# Patient Record
Sex: MALE | Race: WHITE | NOT HISPANIC OR LATINO
[De-identification: names, ages, dates, MRNs, and addresses within clinical notes are randomized per-mention and may not be internally consistent; named-entity substitution may affect disease eponyms.]

---

## 2017-01-02 ENCOUNTER — RX RENEWAL (OUTPATIENT)
Age: 82
End: 2017-01-02

## 2017-01-04 ENCOUNTER — APPOINTMENT (OUTPATIENT)
Dept: INTERNAL MEDICINE | Facility: CLINIC | Age: 82
End: 2017-01-04

## 2017-01-04 VITALS
OXYGEN SATURATION: 95 % | TEMPERATURE: 97.4 F | BODY MASS INDEX: 24.38 KG/M2 | HEIGHT: 72 IN | DIASTOLIC BLOOD PRESSURE: 74 MMHG | WEIGHT: 180 LBS | HEART RATE: 85 BPM | SYSTOLIC BLOOD PRESSURE: 115 MMHG

## 2017-01-26 ENCOUNTER — RX RENEWAL (OUTPATIENT)
Age: 82
End: 2017-01-26

## 2017-02-02 ENCOUNTER — APPOINTMENT (OUTPATIENT)
Dept: INTERNAL MEDICINE | Facility: CLINIC | Age: 82
End: 2017-02-02

## 2017-02-02 VITALS
SYSTOLIC BLOOD PRESSURE: 150 MMHG | DIASTOLIC BLOOD PRESSURE: 83 MMHG | TEMPERATURE: 97.6 F | HEART RATE: 83 BPM | BODY MASS INDEX: 23.84 KG/M2 | HEIGHT: 72 IN | WEIGHT: 176 LBS | OXYGEN SATURATION: 96 %

## 2017-02-03 LAB
ALBUMIN SERPL ELPH-MCNC: 4 G/DL
ALP BLD-CCNC: 86 U/L
ALT SERPL-CCNC: 18 U/L
ANION GAP SERPL CALC-SCNC: 17 MMOL/L
AST SERPL-CCNC: 32 U/L
BASOPHILS # BLD AUTO: 0.03 K/UL
BASOPHILS NFR BLD AUTO: 0.4 %
BILIRUB SERPL-MCNC: 0.6 MG/DL
BUN SERPL-MCNC: 16 MG/DL
CALCIUM SERPL-MCNC: 9.7 MG/DL
CHLORIDE SERPL-SCNC: 101 MMOL/L
CHOLEST SERPL-MCNC: 215 MG/DL
CHOLEST/HDLC SERPL: 2.3 RATIO
CO2 SERPL-SCNC: 25 MMOL/L
CREAT SERPL-MCNC: 0.81 MG/DL
EOSINOPHIL # BLD AUTO: 0.24 K/UL
EOSINOPHIL NFR BLD AUTO: 3.5 %
GLUCOSE SERPL-MCNC: 88 MG/DL
HBA1C MFR BLD HPLC: 5.4 %
HCT VFR BLD CALC: 45.2 %
HDLC SERPL-MCNC: 93 MG/DL
HGB BLD-MCNC: 14.9 G/DL
IMM GRANULOCYTES NFR BLD AUTO: 0.3 %
LDLC SERPL CALC-MCNC: 100 MG/DL
LYMPHOCYTES # BLD AUTO: 1.66 K/UL
LYMPHOCYTES NFR BLD AUTO: 23.9 %
MAN DIFF?: NORMAL
MCHC RBC-ENTMCNC: 31.7 PG
MCHC RBC-ENTMCNC: 33 GM/DL
MCV RBC AUTO: 96.2 FL
MONOCYTES # BLD AUTO: 0.57 K/UL
MONOCYTES NFR BLD AUTO: 8.2 %
NEUTROPHILS # BLD AUTO: 4.42 K/UL
NEUTROPHILS NFR BLD AUTO: 63.7 %
PLATELET # BLD AUTO: 305 K/UL
POTASSIUM SERPL-SCNC: 4.6 MMOL/L
PROT SERPL-MCNC: 7 G/DL
RBC # BLD: 4.7 M/UL
RBC # FLD: 12.9 %
SODIUM SERPL-SCNC: 143 MMOL/L
T4 SERPL-MCNC: 8.4 UG/DL
TRIGL SERPL-MCNC: 112 MG/DL
TSH SERPL-ACNC: 2.25 UIU/ML
WBC # FLD AUTO: 6.94 K/UL

## 2017-02-17 ENCOUNTER — APPOINTMENT (OUTPATIENT)
Dept: INTERNAL MEDICINE | Facility: CLINIC | Age: 82
End: 2017-02-17

## 2017-02-17 VITALS
TEMPERATURE: 97.6 F | HEART RATE: 85 BPM | SYSTOLIC BLOOD PRESSURE: 146 MMHG | WEIGHT: 176 LBS | OXYGEN SATURATION: 94 % | DIASTOLIC BLOOD PRESSURE: 89 MMHG | HEIGHT: 72 IN | BODY MASS INDEX: 23.84 KG/M2

## 2017-02-17 RX ORDER — HYDROCORTISONE BUTYRATE 1 MG/G
0.1 CREAM TOPICAL
Qty: 60 | Refills: 0 | Status: COMPLETED | COMMUNITY
Start: 2016-11-07

## 2017-02-17 RX ORDER — LEVOTHYROXINE SODIUM 0.11 MG/1
112 TABLET ORAL
Qty: 90 | Refills: 0 | Status: DISCONTINUED | COMMUNITY
Start: 2016-12-04

## 2017-02-18 LAB
ALBUMIN SERPL ELPH-MCNC: 3.8 G/DL
ALP BLD-CCNC: 160 U/L
ALT SERPL-CCNC: 61 U/L
ANION GAP SERPL CALC-SCNC: 15 MMOL/L
AST SERPL-CCNC: 39 U/L
BASOPHILS # BLD AUTO: 0.03 K/UL
BASOPHILS NFR BLD AUTO: 0.5 %
BILIRUB SERPL-MCNC: 0.7 MG/DL
BUN SERPL-MCNC: 15 MG/DL
CALCIUM SERPL-MCNC: 9.2 MG/DL
CHLORIDE SERPL-SCNC: 99 MMOL/L
CO2 SERPL-SCNC: 26 MMOL/L
CREAT SERPL-MCNC: 0.79 MG/DL
EOSINOPHIL # BLD AUTO: 0.14 K/UL
EOSINOPHIL NFR BLD AUTO: 2.4 %
GLUCOSE SERPL-MCNC: 118 MG/DL
HBA1C MFR BLD HPLC: 5.6 %
HCT VFR BLD CALC: 41.9 %
HGB BLD-MCNC: 14 G/DL
IMM GRANULOCYTES NFR BLD AUTO: 0.2 %
INR PPP: 1.01 RATIO
LPL SERPL-CCNC: 21 U/L
LYMPHOCYTES # BLD AUTO: 1.68 K/UL
LYMPHOCYTES NFR BLD AUTO: 28.6 %
MAN DIFF?: NORMAL
MCHC RBC-ENTMCNC: 31.6 PG
MCHC RBC-ENTMCNC: 33.4 GM/DL
MCV RBC AUTO: 94.6 FL
MONOCYTES # BLD AUTO: 0.51 K/UL
MONOCYTES NFR BLD AUTO: 8.7 %
NEUTROPHILS # BLD AUTO: 3.5 K/UL
NEUTROPHILS NFR BLD AUTO: 59.6 %
PLATELET # BLD AUTO: 429 K/UL
POTASSIUM SERPL-SCNC: 4.2 MMOL/L
PROT SERPL-MCNC: 6.3 G/DL
PT BLD: 11.4 SEC
RBC # BLD: 4.43 M/UL
RBC # FLD: 12.9 %
SODIUM SERPL-SCNC: 140 MMOL/L
T4 SERPL-MCNC: 12.6 UG/DL
TSH SERPL-ACNC: 2.41 UIU/ML
WBC # FLD AUTO: 5.87 K/UL

## 2017-02-19 ENCOUNTER — RX RENEWAL (OUTPATIENT)
Age: 82
End: 2017-02-19

## 2017-02-24 ENCOUNTER — RX RENEWAL (OUTPATIENT)
Age: 82
End: 2017-02-24

## 2017-03-24 ENCOUNTER — EMERGENCY (EMERGENCY)
Facility: HOSPITAL | Age: 82
LOS: 1 days | Discharge: HOME | End: 2017-03-24
Attending: EMERGENCY MEDICINE | Admitting: EMERGENCY MEDICINE
Payer: MEDICARE

## 2017-03-24 VITALS
SYSTOLIC BLOOD PRESSURE: 138 MMHG | HEART RATE: 82 BPM | OXYGEN SATURATION: 100 % | DIASTOLIC BLOOD PRESSURE: 91 MMHG | TEMPERATURE: 98 F | RESPIRATION RATE: 16 BRPM

## 2017-03-24 VITALS
RESPIRATION RATE: 16 BRPM | OXYGEN SATURATION: 98 % | SYSTOLIC BLOOD PRESSURE: 104 MMHG | HEART RATE: 88 BPM | DIASTOLIC BLOOD PRESSURE: 83 MMHG

## 2017-03-24 PROCEDURE — 72125 CT NECK SPINE W/O DYE: CPT | Mod: 26

## 2017-03-24 PROCEDURE — 99284 EMERGENCY DEPT VISIT MOD MDM: CPT | Mod: GC

## 2017-03-24 PROCEDURE — 70450 CT HEAD/BRAIN W/O DYE: CPT | Mod: 26

## 2017-03-24 RX ORDER — TETANUS TOXOID, REDUCED DIPHTHERIA TOXOID AND ACELLULAR PERTUSSIS VACCINE, ADSORBED 5; 2.5; 8; 8; 2.5 [IU]/.5ML; [IU]/.5ML; UG/.5ML; UG/.5ML; UG/.5ML
0.5 SUSPENSION INTRAMUSCULAR ONCE
Qty: 0 | Refills: 0 | Status: COMPLETED | OUTPATIENT
Start: 2017-03-24 | End: 2017-03-24

## 2017-03-24 RX ADMIN — TETANUS TOXOID, REDUCED DIPHTHERIA TOXOID AND ACELLULAR PERTUSSIS VACCINE, ADSORBED 0.5 MILLILITER(S): 5; 2.5; 8; 8; 2.5 SUSPENSION INTRAMUSCULAR at 16:43

## 2017-03-24 NOTE — ED PROVIDER NOTE - OBJECTIVE STATEMENT
82 M presents to ER after mechanical fall which happened when he accidently hit his head on the garage door and fell to the ground.  Patient denies any HA, no current complaints, unknown when tetanus was, currently on ASA / plavix, called PMD and had VNS visit advising ER eval.

## 2017-03-24 NOTE — ED PROVIDER NOTE - PSH
2006 turp    Adrenal Gland Cyst    Bilat Ing Hernia  2009  CVA (Cerebral Infarction)  pt unsure if it was qa stroke or not lost rt peripheral vision which returned  LEFT CATARACT 5/11/10

## 2017-03-24 NOTE — ED PROVIDER NOTE - ATTENDING CONTRIBUTION TO CARE
83 yo male on plavix hit his haed on the garage door No headache, nausea, vomiting, confusion, amnesia, // NL vitals afebrile no distress ao3 rrr s1s2 cta bl abd sntnd no cva or calf tenderness//ct head without bleed dc

## 2017-03-24 NOTE — ED ADULT TRIAGE NOTE - CHIEF COMPLAINT QUOTE
Pt A+OX2-3 and is at his baseline MS.  States he walked into garage door hitting head and fell.  Hematoma to top of head observed.  No open lac.  VN and MD told pt to go to ER for CT.  Denies any other symptoms

## 2017-04-20 ENCOUNTER — APPOINTMENT (OUTPATIENT)
Dept: INTERNAL MEDICINE | Facility: CLINIC | Age: 82
End: 2017-04-20

## 2017-04-20 ENCOUNTER — RX RENEWAL (OUTPATIENT)
Age: 82
End: 2017-04-20

## 2017-04-20 VITALS
HEART RATE: 95 BPM | TEMPERATURE: 97 F | SYSTOLIC BLOOD PRESSURE: 141 MMHG | BODY MASS INDEX: 22.35 KG/M2 | HEIGHT: 72 IN | DIASTOLIC BLOOD PRESSURE: 76 MMHG | OXYGEN SATURATION: 92 % | WEIGHT: 165 LBS

## 2017-04-20 DIAGNOSIS — K80.50 CALCULUS OF BILE DUCT W/OUT CHOLANGITIS OR CHOLECYSTITIS W/OUT OBSTRUCTION: ICD-10-CM

## 2017-04-20 DIAGNOSIS — L03.114 CELLULITIS OF LEFT UPPER LIMB: ICD-10-CM

## 2017-04-20 DIAGNOSIS — K80.20 CALCULUS OF GALLBLADDER W/OUT CHOLECYSTITIS W/OUT OBSTRUCTION: ICD-10-CM

## 2017-04-20 DIAGNOSIS — Z01.818 ENCOUNTER FOR OTHER PREPROCEDURAL EXAMINATION: ICD-10-CM

## 2017-04-20 DIAGNOSIS — R21 RASH AND OTHER NONSPECIFIC SKIN ERUPTION: ICD-10-CM

## 2017-04-20 DIAGNOSIS — Z86.39 PERSONAL HISTORY OF OTHER ENDOCRINE, NUTRITIONAL AND METABOLIC DISEASE: ICD-10-CM

## 2017-04-20 DIAGNOSIS — S80.12XA CONTUSION OF LEFT LOWER LEG, INITIAL ENCOUNTER: ICD-10-CM

## 2017-04-21 LAB
ALBUMIN SERPL ELPH-MCNC: 3.8 G/DL
ALP BLD-CCNC: 134 U/L
ALT SERPL-CCNC: 34 U/L
ANION GAP SERPL CALC-SCNC: 15 MMOL/L
AST SERPL-CCNC: 48 U/L
BASOPHILS # BLD AUTO: 0.02 K/UL
BASOPHILS NFR BLD AUTO: 0.3 %
BILIRUB SERPL-MCNC: 0.3 MG/DL
BUN SERPL-MCNC: 18 MG/DL
CALCIUM SERPL-MCNC: 9.4 MG/DL
CANCER AG19-9 SERPL-ACNC: 33 U/ML
CEA SERPL-MCNC: 2.9 NG/ML
CHLORIDE SERPL-SCNC: 101 MMOL/L
CHOLEST SERPL-MCNC: 170 MG/DL
CHOLEST/HDLC SERPL: 2.7 RATIO
CO2 SERPL-SCNC: 24 MMOL/L
CREAT SERPL-MCNC: 0.8 MG/DL
EOSINOPHIL # BLD AUTO: 0.24 K/UL
EOSINOPHIL NFR BLD AUTO: 3.1 %
GLUCOSE SERPL-MCNC: 91 MG/DL
HBA1C MFR BLD HPLC: 5.5 %
HCT VFR BLD CALC: 38.7 %
HDLC SERPL-MCNC: 62 MG/DL
HGB BLD-MCNC: 12.4 G/DL
IMM GRANULOCYTES NFR BLD AUTO: 0.3 %
LDLC SERPL CALC-MCNC: 81 MG/DL
LYMPHOCYTES # BLD AUTO: 1.63 K/UL
LYMPHOCYTES NFR BLD AUTO: 21.2 %
MAN DIFF?: NORMAL
MCHC RBC-ENTMCNC: 29.7 PG
MCHC RBC-ENTMCNC: 32 GM/DL
MCV RBC AUTO: 92.6 FL
MONOCYTES # BLD AUTO: 0.67 K/UL
MONOCYTES NFR BLD AUTO: 8.7 %
NEUTROPHILS # BLD AUTO: 5.11 K/UL
NEUTROPHILS NFR BLD AUTO: 66.4 %
PLATELET # BLD AUTO: 419 K/UL
POTASSIUM SERPL-SCNC: 4.2 MMOL/L
PROT SERPL-MCNC: 7.2 G/DL
RBC # BLD: 4.18 M/UL
RBC # FLD: 14 %
SODIUM SERPL-SCNC: 140 MMOL/L
T4 SERPL-MCNC: 10.2 UG/DL
TRIGL SERPL-MCNC: 134 MG/DL
TSH SERPL-ACNC: 0.96 UIU/ML
WBC # FLD AUTO: 7.69 K/UL

## 2017-04-30 ENCOUNTER — RX RENEWAL (OUTPATIENT)
Age: 82
End: 2017-04-30

## 2017-05-05 ENCOUNTER — INPATIENT (INPATIENT)
Facility: HOSPITAL | Age: 82
LOS: 3 days | Discharge: HOME | DRG: 872 | End: 2017-05-09
Attending: HOSPITALIST | Admitting: INTERNAL MEDICINE
Payer: MEDICARE

## 2017-05-05 VITALS
DIASTOLIC BLOOD PRESSURE: 88 MMHG | HEART RATE: 121 BPM | SYSTOLIC BLOOD PRESSURE: 153 MMHG | OXYGEN SATURATION: 98 % | TEMPERATURE: 101 F | RESPIRATION RATE: 22 BRPM

## 2017-05-05 PROCEDURE — 99285 EMERGENCY DEPT VISIT HI MDM: CPT | Mod: GC

## 2017-05-05 NOTE — ED PROVIDER NOTE - ATTENDING CONTRIBUTION TO CARE
I have examined and evaluated this patient with the above resident or PA, and agree with the documented clinical history, exam and plan.   Briefly: 82 m h/o htn hld prostate ca presenting with fever, found to be septic due to uti; started on abx, given fluids, admitted to medicine service.

## 2017-05-05 NOTE — ED PROVIDER NOTE - NS ED ROS FT
+ fever, + chills, no change in vision, no change in hearing, no chest pain, no shortness of breath, no abdominal pain, + vomiting, no dysuria, no muscle pain, no rashes, no loss of consciousness. ~ Bry Arthur MD

## 2017-05-05 NOTE — ED PROVIDER NOTE - PHYSICAL EXAMINATION
Physical Exam: elderly M who is in NAD, AAOx3, NCAT, MMM, Trachea midline, PERRLA, CTAB, normal rate and regular rhythm, abdomen is soft and NTND, No edema, No deformity of extremities, No rashes, CN grossly intact, No focal motor or sensory deficits. ~ Bry Arthur MD Physical Exam: elderly M who is in NAD, AAOx3, NCAT, MMM, PERRLA, CTAB, normal rate and regular rhythm, abdomen is soft and NTND, no CVA tenderness, No edema, No deformity of extremities, No rashes, CN grossly intact, No focal motor or sensory deficits. ~ Bry Arthur MD

## 2017-05-05 NOTE — ED PROVIDER NOTE - MEDICAL DECISION MAKING DETAILS
82 m h/o htn hld prostate ca presenting with fever, found to be septic due to uti; started on abx, given fluids, admitted to medicine service.

## 2017-05-05 NOTE — ED ADULT NURSE NOTE - OBJECTIVE STATEMENT
Pt is a 82YOM hx of asthma, HTN, prostate CA BIB EMS complaining of weakness/nausea/vomititing. According to EMS along with pts daughter at bedside, pt saw urologist on Tuesday for routine follow up, was noted to have cloudy urine and was sent for testing. Pts urine resulted with positive UTI, was placed on antibiotics on Thursday. This evening around 7pm, pt was noted to have fever along with associated weakness, nausea and vomiting. Pt did not received anything for fever at home, EMS placed 20G IV to R hand, gave 1L NS and 4mg Zofran IVP. Pt endorses mild improvement, febrile here in .4. Daughter at bedside notes pt to be "more confused then normal", pt A&Ox3 at baseline along with here in ED. Pt is a 82YOM hx of asthma, HTN, prostate CA BIB EMS complaining of weakness/nausea/vomititing. According to EMS along with pts daughter at bedside, pt saw urologist on Tuesday for routine follow up, was noted to have cloudy urine and was sent for testing. Pts urine resulted with positive UTI, was placed on antibiotics on Thursday. This evening around 7pm, pt was noted to have fever along with associated weakness, nausea and vomiting. Pt did not received anything for fever at home, EMS placed 20G IV to R hand, gave 1L NS and 4mg Zofran IVP. Pt endorses mild improvement, febrile here in .4. Daughter at bedside notes pt to be "more confused then normal", pt A&Ox3 at baseline along with here in ED. PT placed on room on monitor, EKG done MD at bedside. Pt is a 82YOM hx of asthma, HTN, prostate CA BIB EMS complaining of weakness/nausea/vomiting. According to EMS along with pts daughter at bedside, pt saw urologist on Tuesday for routine follow up, was noted to have cloudy urine and was sent for testing. Pts urine resulted with positive UTI, was placed on antibiotics on Thursday. This evening around 7pm, pt was noted to have fever along with associated weakness, nausea and vomiting. Pt did not received anything for fever at home, EMS placed 20G IV to R hand, gave 1L NS and 4mg Zofran IVP. Pt endorses mild improvement, febrile here in .4. Daughter at bedside notes pt to be "more confused then normal", pt A&Ox3 at baseline along with here in ED. PT placed on room on monitor, EKG done MD at bedside. Safety maintained at all times, will continue to monitor.

## 2017-05-05 NOTE — ED PROVIDER NOTE - OBJECTIVE STATEMENT
Seen by Urologist 3 days ago, given atbx sulfameth for dark colored urine, but didn't start to take the atbx until this morning.   Started to have fevers 100.7'F, chills, vomiting NBNB x3 that started 8 pm tonight. Associated w/ lethargy, but no falls or head injury. Denies dysuria, or urinary retention.    PMD: Roe Samuel  Uro: Sajan Martinez

## 2017-05-06 DIAGNOSIS — A41.9 SEPSIS, UNSPECIFIED ORGANISM: ICD-10-CM

## 2017-05-06 DIAGNOSIS — N12 TUBULO-INTERSTITIAL NEPHRITIS, NOT SPECIFIED AS ACUTE OR CHRONIC: ICD-10-CM

## 2017-05-06 DIAGNOSIS — Z29.9 ENCOUNTER FOR PROPHYLACTIC MEASURES, UNSPECIFIED: ICD-10-CM

## 2017-05-06 DIAGNOSIS — N40.1 BENIGN PROSTATIC HYPERPLASIA WITH LOWER URINARY TRACT SYMPTOMS: ICD-10-CM

## 2017-05-06 DIAGNOSIS — E03.9 HYPOTHYROIDISM, UNSPECIFIED: ICD-10-CM

## 2017-05-06 DIAGNOSIS — Z79.899 OTHER LONG TERM (CURRENT) DRUG THERAPY: ICD-10-CM

## 2017-05-06 DIAGNOSIS — I10 ESSENTIAL (PRIMARY) HYPERTENSION: ICD-10-CM

## 2017-05-06 LAB
ALBUMIN SERPL ELPH-MCNC: 3.4 G/DL — SIGNIFICANT CHANGE UP (ref 3.3–5)
ALP SERPL-CCNC: 115 U/L — SIGNIFICANT CHANGE UP (ref 40–120)
ALT FLD-CCNC: 23 U/L RC — SIGNIFICANT CHANGE UP (ref 10–45)
ANION GAP SERPL CALC-SCNC: 14 MMOL/L — SIGNIFICANT CHANGE UP (ref 5–17)
ANION GAP SERPL CALC-SCNC: 16 MMOL/L — SIGNIFICANT CHANGE UP (ref 5–17)
APPEARANCE UR: CLEAR — SIGNIFICANT CHANGE UP
APTT BLD: 32 SEC — SIGNIFICANT CHANGE UP (ref 27.5–37.4)
AST SERPL-CCNC: 37 U/L — SIGNIFICANT CHANGE UP (ref 10–40)
BASOPHILS # BLD AUTO: 0 K/UL — SIGNIFICANT CHANGE UP (ref 0–0.2)
BASOPHILS # BLD AUTO: 0 K/UL — SIGNIFICANT CHANGE UP (ref 0–0.2)
BASOPHILS NFR BLD AUTO: 0 % — SIGNIFICANT CHANGE UP (ref 0–2)
BASOPHILS NFR BLD AUTO: 0.1 % — SIGNIFICANT CHANGE UP (ref 0–2)
BILIRUB SERPL-MCNC: 0.5 MG/DL — SIGNIFICANT CHANGE UP (ref 0.2–1.2)
BILIRUB UR-MCNC: NEGATIVE — SIGNIFICANT CHANGE UP
BUN SERPL-MCNC: 14 MG/DL — SIGNIFICANT CHANGE UP (ref 7–23)
BUN SERPL-MCNC: 18 MG/DL — SIGNIFICANT CHANGE UP (ref 7–23)
CALCIUM SERPL-MCNC: 8 MG/DL — LOW (ref 8.4–10.5)
CALCIUM SERPL-MCNC: 8.6 MG/DL — SIGNIFICANT CHANGE UP (ref 8.4–10.5)
CHLORIDE SERPL-SCNC: 101 MMOL/L — SIGNIFICANT CHANGE UP (ref 96–108)
CHLORIDE SERPL-SCNC: 106 MMOL/L — SIGNIFICANT CHANGE UP (ref 96–108)
CO2 SERPL-SCNC: 20 MMOL/L — LOW (ref 22–31)
CO2 SERPL-SCNC: 23 MMOL/L — SIGNIFICANT CHANGE UP (ref 22–31)
COLOR SPEC: YELLOW — SIGNIFICANT CHANGE UP
COMMENT - URINE: SIGNIFICANT CHANGE UP
CREAT SERPL-MCNC: 0.8 MG/DL — SIGNIFICANT CHANGE UP (ref 0.5–1.3)
CREAT SERPL-MCNC: 0.88 MG/DL — SIGNIFICANT CHANGE UP (ref 0.5–1.3)
DIFF PNL FLD: NEGATIVE — SIGNIFICANT CHANGE UP
EOSINOPHIL # BLD AUTO: 0 K/UL — SIGNIFICANT CHANGE UP (ref 0–0.5)
EOSINOPHIL # BLD AUTO: 0.1 K/UL — SIGNIFICANT CHANGE UP (ref 0–0.5)
EOSINOPHIL NFR BLD AUTO: 0.1 % — SIGNIFICANT CHANGE UP (ref 0–6)
EOSINOPHIL NFR BLD AUTO: 0.5 % — SIGNIFICANT CHANGE UP (ref 0–6)
GAS PNL BLDV: SIGNIFICANT CHANGE UP
GLUCOSE SERPL-MCNC: 109 MG/DL — HIGH (ref 70–99)
GLUCOSE SERPL-MCNC: 111 MG/DL — HIGH (ref 70–99)
GLUCOSE UR QL: NEGATIVE — SIGNIFICANT CHANGE UP
HCT VFR BLD CALC: 34.3 % — LOW (ref 39–50)
HCT VFR BLD CALC: 35 % — LOW (ref 39–50)
HGB BLD-MCNC: 12 G/DL — LOW (ref 13–17)
HGB BLD-MCNC: 12.2 G/DL — LOW (ref 13–17)
INR BLD: 1.14 RATIO — SIGNIFICANT CHANGE UP (ref 0.88–1.16)
KETONES UR-MCNC: NEGATIVE — SIGNIFICANT CHANGE UP
LEUKOCYTE ESTERASE UR-ACNC: ABNORMAL
LYMPHOCYTES # BLD AUTO: 0.1 K/UL — LOW (ref 1–3.3)
LYMPHOCYTES # BLD AUTO: 0.2 K/UL — LOW (ref 1–3.3)
LYMPHOCYTES # BLD AUTO: 1.1 % — LOW (ref 13–44)
LYMPHOCYTES # BLD AUTO: 1.6 % — LOW (ref 13–44)
MCHC RBC-ENTMCNC: 31.6 PG — SIGNIFICANT CHANGE UP (ref 27–34)
MCHC RBC-ENTMCNC: 32.1 PG — SIGNIFICANT CHANGE UP (ref 27–34)
MCHC RBC-ENTMCNC: 34.8 GM/DL — SIGNIFICANT CHANGE UP (ref 32–36)
MCHC RBC-ENTMCNC: 35 GM/DL — SIGNIFICANT CHANGE UP (ref 32–36)
MCV RBC AUTO: 90.9 FL — SIGNIFICANT CHANGE UP (ref 80–100)
MCV RBC AUTO: 91.8 FL — SIGNIFICANT CHANGE UP (ref 80–100)
MONOCYTES # BLD AUTO: 0.1 K/UL — SIGNIFICANT CHANGE UP (ref 0–0.9)
MONOCYTES # BLD AUTO: 0.2 K/UL — SIGNIFICANT CHANGE UP (ref 0–0.9)
MONOCYTES NFR BLD AUTO: 1 % — LOW (ref 2–14)
MONOCYTES NFR BLD AUTO: 2.1 % — SIGNIFICANT CHANGE UP (ref 2–14)
NEUTROPHILS # BLD AUTO: 10.4 K/UL — HIGH (ref 1.8–7.4)
NEUTROPHILS # BLD AUTO: 10.7 K/UL — HIGH (ref 1.8–7.4)
NEUTROPHILS NFR BLD AUTO: 96.1 % — HIGH (ref 43–77)
NEUTROPHILS NFR BLD AUTO: 97.3 % — HIGH (ref 43–77)
NITRITE UR-MCNC: NEGATIVE — SIGNIFICANT CHANGE UP
PH UR: 6.5 — SIGNIFICANT CHANGE UP (ref 5–8)
PLATELET # BLD AUTO: 193 K/UL — SIGNIFICANT CHANGE UP (ref 150–400)
PLATELET # BLD AUTO: 242 K/UL — SIGNIFICANT CHANGE UP (ref 150–400)
POTASSIUM SERPL-MCNC: 3.5 MMOL/L — SIGNIFICANT CHANGE UP (ref 3.5–5.3)
POTASSIUM SERPL-MCNC: 3.6 MMOL/L — SIGNIFICANT CHANGE UP (ref 3.5–5.3)
POTASSIUM SERPL-SCNC: 3.5 MMOL/L — SIGNIFICANT CHANGE UP (ref 3.5–5.3)
POTASSIUM SERPL-SCNC: 3.6 MMOL/L — SIGNIFICANT CHANGE UP (ref 3.5–5.3)
PROT SERPL-MCNC: 6.9 G/DL — SIGNIFICANT CHANGE UP (ref 6–8.3)
PROT UR-MCNC: NEGATIVE — SIGNIFICANT CHANGE UP
PROTHROM AB SERPL-ACNC: 12.4 SEC — SIGNIFICANT CHANGE UP (ref 9.8–12.7)
RBC # BLD: 3.73 M/UL — LOW (ref 4.2–5.8)
RBC # BLD: 3.85 M/UL — LOW (ref 4.2–5.8)
RBC # FLD: 12.5 % — SIGNIFICANT CHANGE UP (ref 10.3–14.5)
RBC # FLD: 12.6 % — SIGNIFICANT CHANGE UP (ref 10.3–14.5)
RBC CASTS # UR COMP ASSIST: SIGNIFICANT CHANGE UP /HPF (ref 0–2)
SODIUM SERPL-SCNC: 140 MMOL/L — SIGNIFICANT CHANGE UP (ref 135–145)
SODIUM SERPL-SCNC: 140 MMOL/L — SIGNIFICANT CHANGE UP (ref 135–145)
SP GR SPEC: 1.01 — SIGNIFICANT CHANGE UP (ref 1.01–1.02)
UROBILINOGEN FLD QL: NEGATIVE — SIGNIFICANT CHANGE UP
WBC # BLD: 10.6 K/UL — HIGH (ref 3.8–10.5)
WBC # BLD: 11.2 K/UL — HIGH (ref 3.8–10.5)
WBC # FLD AUTO: 10.6 K/UL — HIGH (ref 3.8–10.5)
WBC # FLD AUTO: 11.2 K/UL — HIGH (ref 3.8–10.5)
WBC UR QL: >50 /HPF (ref 0–5)

## 2017-05-06 PROCEDURE — 93010 ELECTROCARDIOGRAM REPORT: CPT

## 2017-05-06 PROCEDURE — 99223 1ST HOSP IP/OBS HIGH 75: CPT | Mod: GC

## 2017-05-06 PROCEDURE — 71010: CPT | Mod: 26

## 2017-05-06 RX ORDER — CEFTRIAXONE 500 MG/1
1 INJECTION, POWDER, FOR SOLUTION INTRAMUSCULAR; INTRAVENOUS EVERY 24 HOURS
Qty: 0 | Refills: 0 | Status: DISCONTINUED | OUTPATIENT
Start: 2017-05-06 | End: 2017-05-09

## 2017-05-06 RX ORDER — ALBUTEROL 90 UG/1
2 AEROSOL, METERED ORAL EVERY 6 HOURS
Qty: 0 | Refills: 0 | Status: DISCONTINUED | OUTPATIENT
Start: 2017-05-06 | End: 2017-05-09

## 2017-05-06 RX ORDER — ENOXAPARIN SODIUM 100 MG/ML
40 INJECTION SUBCUTANEOUS EVERY 24 HOURS
Qty: 0 | Refills: 0 | Status: DISCONTINUED | OUTPATIENT
Start: 2017-05-06 | End: 2017-05-09

## 2017-05-06 RX ORDER — TAMSULOSIN HYDROCHLORIDE 0.4 MG/1
0.4 CAPSULE ORAL AT BEDTIME
Qty: 0 | Refills: 0 | Status: DISCONTINUED | OUTPATIENT
Start: 2017-05-06 | End: 2017-05-09

## 2017-05-06 RX ORDER — IPRATROPIUM/ALBUTEROL SULFATE 18-103MCG
3 AEROSOL WITH ADAPTER (GRAM) INHALATION ONCE
Qty: 0 | Refills: 0 | Status: DISCONTINUED | OUTPATIENT
Start: 2017-05-06 | End: 2017-05-06

## 2017-05-06 RX ORDER — SODIUM CHLORIDE 9 MG/ML
3 INJECTION INTRAMUSCULAR; INTRAVENOUS; SUBCUTANEOUS ONCE
Qty: 0 | Refills: 0 | Status: COMPLETED | OUTPATIENT
Start: 2017-05-06 | End: 2017-05-06

## 2017-05-06 RX ORDER — SODIUM CHLORIDE 9 MG/ML
1000 INJECTION INTRAMUSCULAR; INTRAVENOUS; SUBCUTANEOUS ONCE
Qty: 0 | Refills: 0 | Status: COMPLETED | OUTPATIENT
Start: 2017-05-06 | End: 2017-05-06

## 2017-05-06 RX ORDER — ACETAMINOPHEN 500 MG
1000 TABLET ORAL ONCE
Qty: 0 | Refills: 0 | Status: COMPLETED | OUTPATIENT
Start: 2017-05-06 | End: 2017-05-06

## 2017-05-06 RX ORDER — BUDESONIDE AND FORMOTEROL FUMARATE DIHYDRATE 160; 4.5 UG/1; UG/1
2 AEROSOL RESPIRATORY (INHALATION)
Qty: 0 | Refills: 0 | Status: DISCONTINUED | OUTPATIENT
Start: 2017-05-06 | End: 2017-05-09

## 2017-05-06 RX ORDER — CITALOPRAM 10 MG/1
10 TABLET, FILM COATED ORAL DAILY
Qty: 0 | Refills: 0 | Status: DISCONTINUED | OUTPATIENT
Start: 2017-05-06 | End: 2017-05-09

## 2017-05-06 RX ORDER — CLOPIDOGREL BISULFATE 75 MG/1
75 TABLET, FILM COATED ORAL DAILY
Qty: 0 | Refills: 0 | Status: DISCONTINUED | OUTPATIENT
Start: 2017-05-06 | End: 2017-05-09

## 2017-05-06 RX ORDER — LEVOTHYROXINE SODIUM 125 MCG
88 TABLET ORAL DAILY
Qty: 0 | Refills: 0 | Status: DISCONTINUED | OUTPATIENT
Start: 2017-05-06 | End: 2017-05-09

## 2017-05-06 RX ORDER — SODIUM CHLORIDE 9 MG/ML
1000 INJECTION, SOLUTION INTRAVENOUS
Qty: 0 | Refills: 0 | Status: DISCONTINUED | OUTPATIENT
Start: 2017-05-06 | End: 2017-05-06

## 2017-05-06 RX ORDER — SODIUM CHLORIDE 9 MG/ML
500 INJECTION INTRAMUSCULAR; INTRAVENOUS; SUBCUTANEOUS
Qty: 0 | Refills: 0 | Status: COMPLETED | OUTPATIENT
Start: 2017-05-06 | End: 2017-05-06

## 2017-05-06 RX ORDER — ONDANSETRON 8 MG/1
4 TABLET, FILM COATED ORAL EVERY 6 HOURS
Qty: 0 | Refills: 0 | Status: DISCONTINUED | OUTPATIENT
Start: 2017-05-06 | End: 2017-05-09

## 2017-05-06 RX ORDER — ACETAMINOPHEN 500 MG
650 TABLET ORAL EVERY 6 HOURS
Qty: 0 | Refills: 0 | Status: DISCONTINUED | OUTPATIENT
Start: 2017-05-06 | End: 2017-05-09

## 2017-05-06 RX ORDER — CEFTRIAXONE 500 MG/1
1 INJECTION, POWDER, FOR SOLUTION INTRAMUSCULAR; INTRAVENOUS ONCE
Qty: 0 | Refills: 0 | Status: COMPLETED | OUTPATIENT
Start: 2017-05-06 | End: 2017-05-06

## 2017-05-06 RX ORDER — ASPIRIN/CALCIUM CARB/MAGNESIUM 324 MG
81 TABLET ORAL DAILY
Qty: 0 | Refills: 0 | Status: DISCONTINUED | OUTPATIENT
Start: 2017-05-06 | End: 2017-05-09

## 2017-05-06 RX ADMIN — SODIUM CHLORIDE 100 MILLILITER(S): 9 INJECTION, SOLUTION INTRAVENOUS at 10:01

## 2017-05-06 RX ADMIN — Medication 88 MICROGRAM(S): at 09:57

## 2017-05-06 RX ADMIN — CITALOPRAM 10 MILLIGRAM(S): 10 TABLET, FILM COATED ORAL at 12:09

## 2017-05-06 RX ADMIN — SODIUM CHLORIDE 2000 MILLILITER(S): 9 INJECTION INTRAMUSCULAR; INTRAVENOUS; SUBCUTANEOUS at 00:15

## 2017-05-06 RX ADMIN — Medication 81 MILLIGRAM(S): at 12:09

## 2017-05-06 RX ADMIN — SODIUM CHLORIDE 100 MILLILITER(S): 9 INJECTION, SOLUTION INTRAVENOUS at 06:43

## 2017-05-06 RX ADMIN — CLOPIDOGREL BISULFATE 75 MILLIGRAM(S): 75 TABLET, FILM COATED ORAL at 12:17

## 2017-05-06 RX ADMIN — Medication 650 MILLIGRAM(S): at 20:29

## 2017-05-06 RX ADMIN — CEFTRIAXONE 100 GRAM(S): 500 INJECTION, POWDER, FOR SOLUTION INTRAMUSCULAR; INTRAVENOUS at 00:30

## 2017-05-06 RX ADMIN — Medication 650 MILLIGRAM(S): at 21:29

## 2017-05-06 RX ADMIN — CEFTRIAXONE 100 GRAM(S): 500 INJECTION, POWDER, FOR SOLUTION INTRAMUSCULAR; INTRAVENOUS at 06:43

## 2017-05-06 RX ADMIN — SODIUM CHLORIDE 1000 MILLILITER(S): 9 INJECTION INTRAMUSCULAR; INTRAVENOUS; SUBCUTANEOUS at 03:03

## 2017-05-06 RX ADMIN — BUDESONIDE AND FORMOTEROL FUMARATE DIHYDRATE 2 PUFF(S): 160; 4.5 AEROSOL RESPIRATORY (INHALATION) at 23:01

## 2017-05-06 RX ADMIN — SODIUM CHLORIDE 2000 MILLILITER(S): 9 INJECTION INTRAMUSCULAR; INTRAVENOUS; SUBCUTANEOUS at 00:16

## 2017-05-06 RX ADMIN — SODIUM CHLORIDE 2000 MILLILITER(S): 9 INJECTION INTRAMUSCULAR; INTRAVENOUS; SUBCUTANEOUS at 01:40

## 2017-05-06 RX ADMIN — ALBUTEROL 2 PUFF(S): 90 AEROSOL, METERED ORAL at 06:43

## 2017-05-06 RX ADMIN — ONDANSETRON 4 MILLIGRAM(S): 8 TABLET, FILM COATED ORAL at 05:53

## 2017-05-06 RX ADMIN — TAMSULOSIN HYDROCHLORIDE 0.4 MILLIGRAM(S): 0.4 CAPSULE ORAL at 23:00

## 2017-05-06 RX ADMIN — SODIUM CHLORIDE 2000 MILLILITER(S): 9 INJECTION INTRAMUSCULAR; INTRAVENOUS; SUBCUTANEOUS at 01:39

## 2017-05-06 RX ADMIN — Medication 400 MILLIGRAM(S): at 00:15

## 2017-05-06 RX ADMIN — SODIUM CHLORIDE 3 MILLILITER(S): 9 INJECTION INTRAMUSCULAR; INTRAVENOUS; SUBCUTANEOUS at 00:08

## 2017-05-06 RX ADMIN — BUDESONIDE AND FORMOTEROL FUMARATE DIHYDRATE 2 PUFF(S): 160; 4.5 AEROSOL RESPIRATORY (INHALATION) at 09:57

## 2017-05-06 RX ADMIN — ONDANSETRON 4 MILLIGRAM(S): 8 TABLET, FILM COATED ORAL at 14:18

## 2017-05-06 NOTE — ED ADULT NURSE REASSESSMENT NOTE - NS ED NURSE REASSESS COMMENT FT1
0250:Straight catheterization performed with 2 RNs at the bedside using sterile technique as per MD order.  Patient tolerated procedure well.  U/A and urine culture obtained and sent to the lab per MD order.  IV fluids still in progress, pending bed assignment upstairs.  Patient is awake and alert and mentating well.  A&O x3, and in no acute distress.

## 2017-05-06 NOTE — H&P ADULT. - PMH
Asthma    BPH (benign prostatic hypertrophy) with urinary retention    Hypertension    Hypothyroid    Prostate Cancer  s/p RT

## 2017-05-06 NOTE — H&P ADULT. - PROBLEM SELECTOR PLAN 2
- meets sepsis criteria with tachycardia and fever with hypotension to SBP 86 in ED  - f/u BCx x2 and UCx sent in ED  - s/p CTX 1g in ED; will c/w CTX 1g QD  - with hypotension in ED that responded to fluid resuscitation; will start LR@75 as still dry on exam, hold home antihypertensives, and monitor VS closely with q4

## 2017-05-06 NOTE — H&P ADULT. - PROBLEM SELECTOR PLAN 1
- noted to have dark urine despite adequate PO hydration 4d PTA by Urologist Dr Martinez but did not start abx until AM prior to admission  - UA(+) with Tmax 101.4 and hypotension down to SBP 86 in ED, meeting sepsis criteria with tachycardia and fever  - f/u BCx x2 and UCx sent in ED  - s/p CTX 1g in ED; will c/w CTX 1g QD  - with hypotension in ED that responded to fluid resuscitation; will start LR@75 as still dry on exam, hold home antihypertensives, and monitor VS closely with q4  - Tylenol PRN for fever  - Zofran PRN for N/V - given N/V, F/C and severity of illness, most likely pyelonephritis  - noted to have dark urine despite adequate PO hydration 4d PTA by Urologist Dr Martinez but did not start abx until AM prior to admission  - UA(+) with Tmax 101.4 and hypotension down to SBP 86 in ED, meeting sepsis criteria with tachycardia and fever  - f/u BCx x2 and UCx sent in ED  - s/p CTX 1g in ED; will c/w CTX 1g QD  - with hypotension in ED that responded to fluid resuscitation; will start LR@100 x12h as still dry on exam, hold home antihypertensives, and monitor VS closely with q4  - Tylenol PRN for fever  - Zofran PRN for N/V  - lyon catheter for monitoring critically ill and to relieve any obstruction

## 2017-05-06 NOTE — H&P ADULT. - NEGATIVE NEUROLOGICAL SYMPTOMS
no paresthesias/no loss of sensation/no headache/no vertigo/no difficulty walking/no syncope/no focal seizures/no transient paralysis/no tremors

## 2017-05-06 NOTE — H&P ADULT. - EKG AND INTERPRETATION
personally reviewed and interpreted: personally reviewed and interpreted: sinus tachycardia 120, QTc 444, L axis deviation

## 2017-05-06 NOTE — H&P ADULT. - LAB RESULTS AND INTERPRETATION
personally reviewed and interpreted: mild leukocytosis with PMN predominance, baseline Cr, lactate WNL, UA(+)

## 2017-05-06 NOTE — H&P ADULT. - RS GEN PE MLT RESP DETAILS PC
no rhonchi/no rales/clear to auscultation bilaterally/no intercostal retractions/respirations non-labored/no wheezes no rhonchi/rales/clear to auscultation bilaterally/wheezes/no intercostal retractions/respirations non-labored

## 2017-05-06 NOTE — H&P ADULT. - NEUROLOGICAL DETAILS
normal strength/responds to pain/cranial nerves intact/responds to verbal commands/alert and oriented x 3/sensation intact

## 2017-05-06 NOTE — H&P ADULT. - PROBLEM SELECTOR PLAN 4
- patient unable to recall BPH medications and none noted in outpatient EMR and [facility]; primary team to f/u with wife in AM  - with significant h/o urinary retention requiring lyon recently - c/w home tamsulosin  - with significant h/o urinary retention requiring lyon recently

## 2017-05-06 NOTE — H&P ADULT. - NEGATIVE ENMT SYMPTOMS
no ear pain/no post-nasal discharge/no vertigo/no throat pain/no tinnitus/no sinus symptoms/no nasal congestion/no nasal discharge

## 2017-05-06 NOTE — PROVIDER CONTACT NOTE (OTHER) - SITUATION
VSS, pt refusing synthroid, stating he cannot tolerate it right now. And refusing physical assessment.

## 2017-05-06 NOTE — H&P ADULT. - PROBLEM SELECTOR PLAN 6
- Lovenox for DVT ppx - Lovenox for DVT ppx  - speak with PMD regarding whether patient has h/o stents or cardiac hx; consider monotherapy with Plavix for secondary prevention for TIA ppx

## 2017-05-06 NOTE — H&P ADULT. - HISTORY OF PRESENT ILLNESS
82M h/o asthma/COPD, HTN, h/o TIA, chronic LBP, hypothyroidism, BPH c/b urinary retention (previously with lyon) who was BIBEMS for lethargy, delirium.  Patient was seen by  4d PTA and as he had dark urine, he was started on Sulfameth.  Unfortunately, patient only started to take abx the morning PTA.  At 8p evening PTA, patient stated to have F/C with Tmax 100.7, NBNB N/V x3, and was lethargic.    Of note, patient was seen recently by PMD Dr Samuel on 4/20 and was noted to have 2mo of poor appetite, anhedonia, and listlessness, was started on Celexa 10mg QD with plans to uptitrate to 20 after 4 weeks, and TSH, CEA, and  were check and WNL.    ED Course  VS Tmax 101.2 HR  BP /61-88 RR 22 SpO2 96%NC2L  WBC 11.2 ANC 10,200 (96.1%) Cr 0.88 (baseline 0.8) lactate 1.8  UA large LE, WBC >50 with clumps  CXR R basilar atelectasis  Given CTX 1g, NS 3L, Ofirmev. 82M h/o asthma/COPD, HTN, h/o TIA, chronic LBP, hypothyroidism, BPH c/b urinary retention (previously with lyon) who was BIBEMS for fevers with rigors.  Patient was seen by  Dr Martinez 4d PTA and as he had dark urine and was started on Sulfameth.  Patient only started to take abx the morning PTA.  At 8p evening PTA, patient stated to have F/C with Tmax 100.7, NBNB N/V x1 (clear fluid), and was lethargic.  As he was shaking so much, he called EMS and was brought to the hospital.   Endorses weakness, large amount of urine when he urinates, suprapubic TTP; denies dysuria, urinary hesitancy/frequency, hematuria.  No prior h/o UTIs.  No dizziness, CP, SOB, cough, nasal sx, abdominal pain, C/D, myalgias/athralgias.    Of note, patient was seen recently by PMD Dr Samuel on 4/20 and was noted to have 2mo of poor appetite, anhedonia, and listlessness, was started on Celexa 10mg QD with plans to uptitrate to 20 after 4 weeks, and TSH, CEA, and  were check and WNL.    ED Course  VS Tmax 101.2 HR  BP /61-88 RR 22 SpO2 96%NC2L  WBC 11.2 ANC 10,200 (96.1%) Cr 0.88 (baseline 0.8) lactate 1.8  UA large LE, WBC >50 with clumps  CXR R basilar atelectasis  Given CTX 1g, NS 3L, Ofirmev.  BCx x2 and UCx sent. 82M h/o COPD, HTN, h/o TIA, chronic LBP, hypothyroidism, BPH c/b urinary retention (previously with lyon) who was BIBEMS for fevers with rigors.  Patient was seen by  Dr Martinez 4d PTA and as he had dark urine and was started on Bactrim.  Patient only started to take abx the morning PTA.  At 8p evening PTA, patient stated to have F/C with Tmax 100.7, NBNB N/V x1 (clear fluid), and was lethargic.  As he was shaking so much, he called EMS and was brought to the hospital.   Endorses weakness, large amount of urine when he urinates, suprapubic TTP; denies flank pain, dysuria, urinary hesitancy/frequency, hematuria.  No prior h/o UTIs.  No dizziness, CP, SOB, cough, nasal sx, abdominal pain, C/D, myalgias/athralgias.    Of note, patient was seen recently by PMD Dr Samuel on 4/20 and was noted to have 2mo of poor appetite, anhedonia, and listlessness, was started on Celexa 10mg QD with plans to uptitrate to 20 after 4 weeks, and TSH, CEA, and  were check and WNL.    ED Course  VS Tmax 101.2 HR  BP /61-88 RR 22 SpO2 96%NC2L  WBC 11.2 ANC 10,200 (96.1%) Cr 0.88 (baseline 0.8) lactate 1.8  UA large LE, WBC >50 with clumps  CXR R basilar atelectasis  Given CTX 1g, NS 3L, Ofirmev.  BCx x2 and UCx sent.

## 2017-05-06 NOTE — H&P ADULT. - ASSESSMENT
82M h/o asthma/COPD, HTN, h/o TIA, chronic LBP, hypothyroidism, BPH c/b urinary retention (previously with lyon) who was BIBEMS for fevers with rigors likely 2/2 pyelonephritis.

## 2017-05-06 NOTE — H&P ADULT. - NEGATIVE GENERAL GENITOURINARY SYMPTOMS
no hematuria/no flank pain R/no dysuria/no flank pain L/normal urinary frequency/no bladder infections/no urinary hesitancy

## 2017-05-06 NOTE — H&P ADULT. - PROBLEM SELECTOR PLAN 3
- hold home antihypertensives as hypotension 2/2 sepsis  - DASH diet - hold home antihypertensives and Lasix as hypotension 2/2 sepsis  - DASH diet

## 2017-05-07 LAB
ANION GAP SERPL CALC-SCNC: 14 MMOL/L — SIGNIFICANT CHANGE UP (ref 5–17)
BASOPHILS # BLD AUTO: 0 K/UL — SIGNIFICANT CHANGE UP (ref 0–0.2)
BASOPHILS NFR BLD AUTO: 0 % — SIGNIFICANT CHANGE UP (ref 0–2)
BUN SERPL-MCNC: 16 MG/DL — SIGNIFICANT CHANGE UP (ref 7–23)
CALCIUM SERPL-MCNC: 8.3 MG/DL — LOW (ref 8.4–10.5)
CHLORIDE SERPL-SCNC: 104 MMOL/L — SIGNIFICANT CHANGE UP (ref 96–108)
CO2 SERPL-SCNC: 18 MMOL/L — LOW (ref 22–31)
CREAT SERPL-MCNC: 0.73 MG/DL — SIGNIFICANT CHANGE UP (ref 0.5–1.3)
CULTURE RESULTS: SIGNIFICANT CHANGE UP
EOSINOPHIL # BLD AUTO: 0 K/UL — SIGNIFICANT CHANGE UP (ref 0–0.5)
EOSINOPHIL NFR BLD AUTO: 0 % — SIGNIFICANT CHANGE UP (ref 0–6)
GLUCOSE SERPL-MCNC: 113 MG/DL — HIGH (ref 70–99)
HCT VFR BLD CALC: 33.2 % — LOW (ref 39–50)
HGB BLD-MCNC: 11.1 G/DL — LOW (ref 13–17)
IMM GRANULOCYTES NFR BLD AUTO: 0.3 % — SIGNIFICANT CHANGE UP (ref 0–1.5)
LYMPHOCYTES # BLD AUTO: 0.14 K/UL — LOW (ref 1–3.3)
LYMPHOCYTES # BLD AUTO: 1.9 % — LOW (ref 13–44)
MAGNESIUM SERPL-MCNC: 1.6 MG/DL — SIGNIFICANT CHANGE UP (ref 1.6–2.6)
MCHC RBC-ENTMCNC: 30.2 PG — SIGNIFICANT CHANGE UP (ref 27–34)
MCHC RBC-ENTMCNC: 33.4 GM/DL — SIGNIFICANT CHANGE UP (ref 32–36)
MCV RBC AUTO: 90.2 FL — SIGNIFICANT CHANGE UP (ref 80–100)
MONOCYTES # BLD AUTO: 0.14 K/UL — SIGNIFICANT CHANGE UP (ref 0–0.9)
MONOCYTES NFR BLD AUTO: 1.9 % — LOW (ref 2–14)
NEUTROPHILS # BLD AUTO: 7.26 K/UL — SIGNIFICANT CHANGE UP (ref 1.8–7.4)
NEUTROPHILS NFR BLD AUTO: 95.9 % — HIGH (ref 43–77)
PHOSPHATE SERPL-MCNC: 1.2 MG/DL — LOW (ref 2.5–4.5)
PLATELET # BLD AUTO: 185 K/UL — SIGNIFICANT CHANGE UP (ref 150–400)
POTASSIUM SERPL-MCNC: 3.7 MMOL/L — SIGNIFICANT CHANGE UP (ref 3.5–5.3)
POTASSIUM SERPL-SCNC: 3.7 MMOL/L — SIGNIFICANT CHANGE UP (ref 3.5–5.3)
RBC # BLD: 3.68 M/UL — LOW (ref 4.2–5.8)
RBC # FLD: 14.3 % — SIGNIFICANT CHANGE UP (ref 10.3–14.5)
SODIUM SERPL-SCNC: 136 MMOL/L — SIGNIFICANT CHANGE UP (ref 135–145)
SPECIMEN SOURCE: SIGNIFICANT CHANGE UP
WBC # BLD: 7.56 K/UL — SIGNIFICANT CHANGE UP (ref 3.8–10.5)
WBC # FLD AUTO: 7.56 K/UL — SIGNIFICANT CHANGE UP (ref 3.8–10.5)

## 2017-05-07 PROCEDURE — 99232 SBSQ HOSP IP/OBS MODERATE 35: CPT

## 2017-05-07 RX ADMIN — ONDANSETRON 4 MILLIGRAM(S): 8 TABLET, FILM COATED ORAL at 03:30

## 2017-05-07 RX ADMIN — BUDESONIDE AND FORMOTEROL FUMARATE DIHYDRATE 2 PUFF(S): 160; 4.5 AEROSOL RESPIRATORY (INHALATION) at 09:27

## 2017-05-07 RX ADMIN — Medication 88 MICROGRAM(S): at 05:53

## 2017-05-07 RX ADMIN — CEFTRIAXONE 100 GRAM(S): 500 INJECTION, POWDER, FOR SOLUTION INTRAMUSCULAR; INTRAVENOUS at 05:54

## 2017-05-07 RX ADMIN — ALBUTEROL 2 PUFF(S): 90 AEROSOL, METERED ORAL at 02:39

## 2017-05-07 RX ADMIN — CLOPIDOGREL BISULFATE 75 MILLIGRAM(S): 75 TABLET, FILM COATED ORAL at 11:25

## 2017-05-07 RX ADMIN — TAMSULOSIN HYDROCHLORIDE 0.4 MILLIGRAM(S): 0.4 CAPSULE ORAL at 21:30

## 2017-05-07 RX ADMIN — Medication 650 MILLIGRAM(S): at 15:23

## 2017-05-07 RX ADMIN — BUDESONIDE AND FORMOTEROL FUMARATE DIHYDRATE 2 PUFF(S): 160; 4.5 AEROSOL RESPIRATORY (INHALATION) at 21:30

## 2017-05-07 RX ADMIN — ENOXAPARIN SODIUM 40 MILLIGRAM(S): 100 INJECTION SUBCUTANEOUS at 05:53

## 2017-05-07 RX ADMIN — Medication 650 MILLIGRAM(S): at 15:47

## 2017-05-07 RX ADMIN — Medication 81 MILLIGRAM(S): at 11:25

## 2017-05-07 RX ADMIN — Medication 63.75 MILLIMOLE(S): at 12:34

## 2017-05-07 RX ADMIN — CITALOPRAM 10 MILLIGRAM(S): 10 TABLET, FILM COATED ORAL at 11:25

## 2017-05-08 ENCOUNTER — RX RENEWAL (OUTPATIENT)
Age: 82
End: 2017-05-08

## 2017-05-08 ENCOUNTER — TRANSCRIPTION ENCOUNTER (OUTPATIENT)
Age: 82
End: 2017-05-08

## 2017-05-08 LAB
ANION GAP SERPL CALC-SCNC: 12 MMOL/L — SIGNIFICANT CHANGE UP (ref 5–17)
BASOPHILS # BLD AUTO: 0 K/UL — SIGNIFICANT CHANGE UP (ref 0–0.2)
BASOPHILS # BLD AUTO: 0 K/UL — SIGNIFICANT CHANGE UP (ref 0–0.2)
BASOPHILS NFR BLD AUTO: 0 % — SIGNIFICANT CHANGE UP (ref 0–2)
BASOPHILS NFR BLD AUTO: 0 % — SIGNIFICANT CHANGE UP (ref 0–2)
BUN SERPL-MCNC: 17 MG/DL — SIGNIFICANT CHANGE UP (ref 7–23)
CALCIUM SERPL-MCNC: 8.2 MG/DL — LOW (ref 8.4–10.5)
CHLORIDE SERPL-SCNC: 101 MMOL/L — SIGNIFICANT CHANGE UP (ref 96–108)
CO2 SERPL-SCNC: 19 MMOL/L — LOW (ref 22–31)
CREAT SERPL-MCNC: 0.66 MG/DL — SIGNIFICANT CHANGE UP (ref 0.5–1.3)
EOSINOPHIL # BLD AUTO: 0 K/UL — SIGNIFICANT CHANGE UP (ref 0–0.5)
EOSINOPHIL # BLD AUTO: 0 K/UL — SIGNIFICANT CHANGE UP (ref 0–0.5)
EOSINOPHIL NFR BLD AUTO: 0 % — SIGNIFICANT CHANGE UP (ref 0–6)
EOSINOPHIL NFR BLD AUTO: 0.8 % — SIGNIFICANT CHANGE UP (ref 0–6)
GLUCOSE SERPL-MCNC: 83 MG/DL — SIGNIFICANT CHANGE UP (ref 70–99)
HCT VFR BLD CALC: 31.3 % — LOW (ref 39–50)
HCT VFR BLD CALC: 34.2 % — LOW (ref 39–50)
HGB BLD-MCNC: 10.3 G/DL — LOW (ref 13–17)
HGB BLD-MCNC: 11.8 G/DL — LOW (ref 13–17)
LYMPHOCYTES # BLD AUTO: 0.39 K/UL — LOW (ref 1–3.3)
LYMPHOCYTES # BLD AUTO: 0.5 K/UL — LOW (ref 1–3.3)
LYMPHOCYTES # BLD AUTO: 11 % — LOW (ref 13–44)
LYMPHOCYTES # BLD AUTO: 14 % — SIGNIFICANT CHANGE UP (ref 13–44)
MAGNESIUM SERPL-MCNC: 1.7 MG/DL — SIGNIFICANT CHANGE UP (ref 1.6–2.6)
MCHC RBC-ENTMCNC: 29.1 PG — SIGNIFICANT CHANGE UP (ref 27–34)
MCHC RBC-ENTMCNC: 31.6 PG — SIGNIFICANT CHANGE UP (ref 27–34)
MCHC RBC-ENTMCNC: 32.9 GM/DL — SIGNIFICANT CHANGE UP (ref 32–36)
MCHC RBC-ENTMCNC: 34.5 GM/DL — SIGNIFICANT CHANGE UP (ref 32–36)
MCV RBC AUTO: 88.4 FL — SIGNIFICANT CHANGE UP (ref 80–100)
MCV RBC AUTO: 91.6 FL — SIGNIFICANT CHANGE UP (ref 80–100)
MONOCYTES # BLD AUTO: 0.04 K/UL — SIGNIFICANT CHANGE UP (ref 0–0.9)
MONOCYTES # BLD AUTO: 0.2 K/UL — SIGNIFICANT CHANGE UP (ref 0–0.9)
MONOCYTES NFR BLD AUTO: 1 % — LOW (ref 2–14)
MONOCYTES NFR BLD AUTO: 5.3 % — SIGNIFICANT CHANGE UP (ref 2–14)
NEUTROPHILS # BLD AUTO: 2.8 K/UL — SIGNIFICANT CHANGE UP (ref 1.8–7.4)
NEUTROPHILS # BLD AUTO: 3.08 K/UL — SIGNIFICANT CHANGE UP (ref 1.8–7.4)
NEUTROPHILS NFR BLD AUTO: 79.9 % — HIGH (ref 43–77)
NEUTROPHILS NFR BLD AUTO: 88 % — HIGH (ref 43–77)
PHOSPHATE SERPL-MCNC: 1.8 MG/DL — LOW (ref 2.5–4.5)
PLATELET # BLD AUTO: 151 K/UL — SIGNIFICANT CHANGE UP (ref 150–400)
PLATELET # BLD AUTO: 181 K/UL — SIGNIFICANT CHANGE UP (ref 150–400)
POTASSIUM SERPL-MCNC: 3.5 MMOL/L — SIGNIFICANT CHANGE UP (ref 3.5–5.3)
POTASSIUM SERPL-SCNC: 3.5 MMOL/L — SIGNIFICANT CHANGE UP (ref 3.5–5.3)
RBC # BLD: 3.54 M/UL — LOW (ref 4.2–5.8)
RBC # BLD: 3.73 M/UL — LOW (ref 4.2–5.8)
RBC # FLD: 12.5 % — SIGNIFICANT CHANGE UP (ref 10.3–14.5)
RBC # FLD: 13.9 % — SIGNIFICANT CHANGE UP (ref 10.3–14.5)
SODIUM SERPL-SCNC: 132 MMOL/L — LOW (ref 135–145)
WBC # BLD: 3.5 K/UL — LOW (ref 3.8–10.5)
WBC # BLD: 3.6 K/UL — LOW (ref 3.8–10.5)
WBC # FLD AUTO: 3.5 K/UL — LOW (ref 3.8–10.5)
WBC # FLD AUTO: 3.6 K/UL — LOW (ref 3.8–10.5)

## 2017-05-08 PROCEDURE — 99233 SBSQ HOSP IP/OBS HIGH 50: CPT | Mod: GC

## 2017-05-08 RX ORDER — FUROSEMIDE 40 MG
1 TABLET ORAL
Qty: 0 | Refills: 0 | COMMUNITY

## 2017-05-08 RX ORDER — VERAPAMIL HCL 240 MG
1 CAPSULE, EXTENDED RELEASE PELLETS 24 HR ORAL
Qty: 0 | Refills: 0 | COMMUNITY

## 2017-05-08 RX ORDER — CIPROFLOXACIN LACTATE 400MG/40ML
1 VIAL (ML) INTRAVENOUS
Qty: 8 | Refills: 0 | OUTPATIENT
Start: 2017-05-08 | End: 2017-05-12

## 2017-05-08 RX ORDER — SODIUM,POTASSIUM PHOSPHATES 278-250MG
1 POWDER IN PACKET (EA) ORAL
Qty: 0 | Refills: 0 | Status: DISCONTINUED | OUTPATIENT
Start: 2017-05-08 | End: 2017-05-09

## 2017-05-08 RX ADMIN — BUDESONIDE AND FORMOTEROL FUMARATE DIHYDRATE 2 PUFF(S): 160; 4.5 AEROSOL RESPIRATORY (INHALATION) at 11:41

## 2017-05-08 RX ADMIN — CEFTRIAXONE 100 GRAM(S): 500 INJECTION, POWDER, FOR SOLUTION INTRAMUSCULAR; INTRAVENOUS at 05:07

## 2017-05-08 RX ADMIN — BUDESONIDE AND FORMOTEROL FUMARATE DIHYDRATE 2 PUFF(S): 160; 4.5 AEROSOL RESPIRATORY (INHALATION) at 21:38

## 2017-05-08 RX ADMIN — TAMSULOSIN HYDROCHLORIDE 0.4 MILLIGRAM(S): 0.4 CAPSULE ORAL at 21:37

## 2017-05-08 RX ADMIN — Medication 1 TABLET(S): at 14:52

## 2017-05-08 RX ADMIN — Medication 81 MILLIGRAM(S): at 11:41

## 2017-05-08 RX ADMIN — ENOXAPARIN SODIUM 40 MILLIGRAM(S): 100 INJECTION SUBCUTANEOUS at 05:07

## 2017-05-08 RX ADMIN — Medication 88 MICROGRAM(S): at 05:07

## 2017-05-08 RX ADMIN — Medication 1 TABLET(S): at 21:41

## 2017-05-08 RX ADMIN — CITALOPRAM 10 MILLIGRAM(S): 10 TABLET, FILM COATED ORAL at 11:41

## 2017-05-08 RX ADMIN — CLOPIDOGREL BISULFATE 75 MILLIGRAM(S): 75 TABLET, FILM COATED ORAL at 11:41

## 2017-05-08 NOTE — DISCHARGE NOTE ADULT - CARE PROVIDERS DIRECT ADDRESSES
[email or web address]_Unknown

## 2017-05-08 NOTE — DISCHARGE NOTE ADULT - MEDICATION SUMMARY - MEDICATIONS TO STOP TAKING
I will STOP taking the medications listed below when I get home from the hospital:    Lasix 40 mg oral tablet  -- 1 tab(s) by mouth once a day    potassium chloride 20 mEq oral tablet, extended release  -- 1 tab(s) by mouth once a day    verapamil 240 mg/24 hours oral tablet, extended release  -- 1 tab(s) by mouth once a day

## 2017-05-08 NOTE — DISCHARGE NOTE ADULT - PATIENT PORTAL LINK FT
“You can access the [employer] Patient Portal, offered by [facility], by registering with the following website: [email or web address]”

## 2017-05-08 NOTE — DISCHARGE NOTE ADULT - HOME CARE AGENCY
Your home care services has been referred  to [facility] home care agency [phone] for Registered  Nurse visit the day after your discharge from the hospital. Please call them to inquire about time of visit.

## 2017-05-08 NOTE — DISCHARGE NOTE ADULT - SECONDARY DIAGNOSIS.
Hypertension BPH (benign prostatic hypertrophy) with urinary retention Hypothyroid Asthma Hypokalemia

## 2017-05-08 NOTE — DISCHARGE NOTE ADULT - CARE PLAN
Principal Discharge DX:	UTI (urinary tract infection)  Goal:	Treat the UTI with antibiotics  Instructions for follow-up, activity and diet:	You received three days of antibiotics during this admission and are being discharged with 4 additional days or oral antibiotics to complete a 7 day course. Please take your medications as prescribed and report any changes in your symptoms to your pcp  Secondary Diagnosis:	Hypertension  Goal:	Maintain BP WNL  Instructions for follow-up, activity and diet:	Please take your medications as prescribed and report any changes in your symptoms to your pcp  Secondary Diagnosis:	BPH (benign prostatic hypertrophy) with urinary retention  Goal:	Prevent urinary retention  Instructions for follow-up, activity and diet:	Please take your medications as prescribed and report any changes in your symptoms to your pcp  Secondary Diagnosis:	Hypothyroid  Goal:	Continue synthroid  Instructions for follow-up, activity and diet:	Please take your medications as prescribed and report any changes in your symptoms to your pcp  Secondary Diagnosis:	Asthma  Goal:	Prevent Asthma exacerbation  Instructions for follow-up, activity and diet:	Please take your medications as prescribed and report any changes in your symptoms to your pcp Principal Discharge DX:	UTI (urinary tract infection)  Goal:	Treat the UTI with antibiotics  Instructions for follow-up, activity and diet:	You received three days of antibiotics during this admission and are being discharged with 4 additional days or oral antibiotics to complete a 7 day course. Please take your medications as prescribed and report any changes in your symptoms to your pcp  Secondary Diagnosis:	Hypertension  Goal:	Maintain BP WNL  Instructions for follow-up, activity and diet:	Please take your medications as prescribed and report any changes in your symptoms to your pcp  Secondary Diagnosis:	BPH (benign prostatic hypertrophy) with urinary retention  Goal:	Prevent urinary retention  Instructions for follow-up, activity and diet:	Please take your medications as prescribed and report any changes in your symptoms to your pcp  Secondary Diagnosis:	Hypothyroid  Goal:	Continue synthroid  Instructions for follow-up, activity and diet:	Please take your medications as prescribed and report any changes in your symptoms to your pcp  Secondary Diagnosis:	Asthma  Goal:	Prevent Asthma exacerbation  Instructions for follow-up, activity and diet:	Please take your medications as prescribed and report any changes in your symptoms to your pcp  Secondary Diagnosis:	Hypokalemia  Instructions for follow-up, activity and diet:	Your K was 3.2 on day of discharge abd you received Potassium tablet 40 mEQ x 1. Please get a follow up BMP check with your PMD within three days of discharge.

## 2017-05-08 NOTE — PHYSICAL THERAPY INITIAL EVALUATION ADULT - PERTINENT HX OF CURRENT PROBLEM, REHAB EVAL
82 y/oM admitted 5/6 BIBEMS for fevers with rigors. Pt was seen by  4d pta, had dark urine, was started on Bactrim, which he started only on day of admission. Recent visit to MD for 2 mos of poor appetite, anhedonia and listlessness, was started on Celexa. Labs showing mild leukocytosis with PMN predominance, baseline Cr, lactate WNL, UA(+). PMH COPD, HTN, h/o TIA, chronic LBP, hypothyroidism, BPH c/b urinary retention (previously with lyon)

## 2017-05-08 NOTE — DISCHARGE NOTE ADULT - CARE PROVIDER_API CALL
Roe Samuel), Gastroenterology; Internal Medicine  [address]  [place] [address]  Phone: [phone]  Fax: [phone]    Cathy Martinez), Otolaryngology  [address] [place] [address]  Phone: [phone]  Fax: [phone]

## 2017-05-08 NOTE — DISCHARGE NOTE ADULT - PLAN OF CARE
You received three days of antibiotics during this admission and are being discharged with 4 additional days or oral antibiotics to complete a 7 day course. Please take your medications as prescribed and report any changes in your symptoms to your pcp Please take your medications as prescribed and report any changes in your symptoms to your pcp Treat the UTI with antibiotics Maintain BP WNL Prevent urinary retention Continue synthroid Prevent Asthma exacerbation Your K was 3.2 on day of discharge abd you received Potassium tablet 40 mEQ x 1. Please get a follow up BMP check with your PMD within three days of discharge.

## 2017-05-08 NOTE — PROVIDER CONTACT NOTE (OTHER) - ACTION/TREATMENT ORDERED:
Flynn to be inserted by 1900 hours RN
Perform bladder scan. Residual urine > 800 ml urine. Flynn placed
MD aware, stated ok to push synthroid to later in the morning. Continue to monitor for safety and comfort. Stated pt needs a lyon for urinary retention.
continue plan of care and reevaluate pt vitals.

## 2017-05-08 NOTE — PHYSICAL THERAPY INITIAL EVALUATION ADULT - ADDITIONAL COMMENTS
Pt lives with wife in single level private house, 2 steps to enter (+) rail. Was independent ambulator without assistive device pta, drove pta. Pt's wife now receiving home services 2/2 recent fall.

## 2017-05-09 ENCOUNTER — APPOINTMENT (OUTPATIENT)
Dept: UROLOGY | Facility: CLINIC | Age: 82
End: 2017-05-09

## 2017-05-09 ENCOUNTER — OUTPATIENT (OUTPATIENT)
Dept: OUTPATIENT SERVICES | Facility: HOSPITAL | Age: 82
LOS: 1 days | End: 2017-05-09
Payer: MEDICARE

## 2017-05-09 VITALS
SYSTOLIC BLOOD PRESSURE: 130 MMHG | TEMPERATURE: 99 F | DIASTOLIC BLOOD PRESSURE: 82 MMHG | OXYGEN SATURATION: 97 % | HEART RATE: 68 BPM | RESPIRATION RATE: 18 BRPM

## 2017-05-09 LAB
ANION GAP SERPL CALC-SCNC: 13 MMOL/L — SIGNIFICANT CHANGE UP (ref 5–17)
BUN SERPL-MCNC: 11 MG/DL — SIGNIFICANT CHANGE UP (ref 7–23)
CALCIUM SERPL-MCNC: 8 MG/DL — LOW (ref 8.4–10.5)
CHLORIDE SERPL-SCNC: 100 MMOL/L — SIGNIFICANT CHANGE UP (ref 96–108)
CO2 SERPL-SCNC: 22 MMOL/L — SIGNIFICANT CHANGE UP (ref 22–31)
CREAT SERPL-MCNC: 0.63 MG/DL — SIGNIFICANT CHANGE UP (ref 0.5–1.3)
GLUCOSE SERPL-MCNC: 79 MG/DL — SIGNIFICANT CHANGE UP (ref 70–99)
HCT VFR BLD CALC: 35.2 % — LOW (ref 39–50)
HGB BLD-MCNC: 11.7 G/DL — LOW (ref 13–17)
MCHC RBC-ENTMCNC: 29.3 PG — SIGNIFICANT CHANGE UP (ref 27–34)
MCHC RBC-ENTMCNC: 33.2 GM/DL — SIGNIFICANT CHANGE UP (ref 32–36)
MCV RBC AUTO: 88.2 FL — SIGNIFICANT CHANGE UP (ref 80–100)
PLATELET # BLD AUTO: 190 K/UL — SIGNIFICANT CHANGE UP (ref 150–400)
POTASSIUM SERPL-MCNC: 3.2 MMOL/L — LOW (ref 3.5–5.3)
POTASSIUM SERPL-SCNC: 3.2 MMOL/L — LOW (ref 3.5–5.3)
RBC # BLD: 3.99 M/UL — LOW (ref 4.2–5.8)
RBC # FLD: 14.2 % — SIGNIFICANT CHANGE UP (ref 10.3–14.5)
SODIUM SERPL-SCNC: 135 MMOL/L — SIGNIFICANT CHANGE UP (ref 135–145)
WBC # BLD: 3.53 K/UL — LOW (ref 3.8–10.5)
WBC # FLD AUTO: 3.53 K/UL — LOW (ref 3.8–10.5)

## 2017-05-09 PROCEDURE — 81001 URINALYSIS AUTO W/SCOPE: CPT

## 2017-05-09 PROCEDURE — 82803 BLOOD GASES ANY COMBINATION: CPT

## 2017-05-09 PROCEDURE — 83605 ASSAY OF LACTIC ACID: CPT

## 2017-05-09 PROCEDURE — 82435 ASSAY OF BLOOD CHLORIDE: CPT

## 2017-05-09 PROCEDURE — 87086 URINE CULTURE/COLONY COUNT: CPT

## 2017-05-09 PROCEDURE — 84295 ASSAY OF SERUM SODIUM: CPT

## 2017-05-09 PROCEDURE — 85014 HEMATOCRIT: CPT

## 2017-05-09 PROCEDURE — 82330 ASSAY OF CALCIUM: CPT

## 2017-05-09 PROCEDURE — 80053 COMPREHEN METABOLIC PANEL: CPT

## 2017-05-09 PROCEDURE — 96374 THER/PROPH/DIAG INJ IV PUSH: CPT | Mod: XU

## 2017-05-09 PROCEDURE — 94640 AIRWAY INHALATION TREATMENT: CPT

## 2017-05-09 PROCEDURE — 85730 THROMBOPLASTIN TIME PARTIAL: CPT

## 2017-05-09 PROCEDURE — 99285 EMERGENCY DEPT VISIT HI MDM: CPT | Mod: 25

## 2017-05-09 PROCEDURE — 85610 PROTHROMBIN TIME: CPT

## 2017-05-09 PROCEDURE — 83735 ASSAY OF MAGNESIUM: CPT

## 2017-05-09 PROCEDURE — 87040 BLOOD CULTURE FOR BACTERIA: CPT

## 2017-05-09 PROCEDURE — 93005 ELECTROCARDIOGRAM TRACING: CPT | Mod: XU

## 2017-05-09 PROCEDURE — 80048 BASIC METABOLIC PNL TOTAL CA: CPT

## 2017-05-09 PROCEDURE — 84132 ASSAY OF SERUM POTASSIUM: CPT

## 2017-05-09 PROCEDURE — 85027 COMPLETE CBC AUTOMATED: CPT

## 2017-05-09 PROCEDURE — 84100 ASSAY OF PHOSPHORUS: CPT

## 2017-05-09 PROCEDURE — 51701 INSERT BLADDER CATHETER: CPT

## 2017-05-09 PROCEDURE — 96375 TX/PRO/DX INJ NEW DRUG ADDON: CPT | Mod: XU

## 2017-05-09 PROCEDURE — 71045 X-RAY EXAM CHEST 1 VIEW: CPT

## 2017-05-09 PROCEDURE — 97161 PT EVAL LOW COMPLEX 20 MIN: CPT

## 2017-05-09 PROCEDURE — 82947 ASSAY GLUCOSE BLOOD QUANT: CPT

## 2017-05-09 PROCEDURE — 51702 INSERT TEMP BLADDER CATH: CPT

## 2017-05-09 PROCEDURE — 99239 HOSP IP/OBS DSCHRG MGMT >30: CPT

## 2017-05-09 RX ORDER — CIPROFLOXACIN LACTATE 400MG/40ML
1 VIAL (ML) INTRAVENOUS
Qty: 6 | Refills: 0 | OUTPATIENT
Start: 2017-05-09 | End: 2017-05-12

## 2017-05-09 RX ORDER — POTASSIUM CHLORIDE 20 MEQ
40 PACKET (EA) ORAL
Qty: 0 | Refills: 0 | Status: DISCONTINUED | OUTPATIENT
Start: 2017-05-09 | End: 2017-05-09

## 2017-05-09 RX ORDER — POTASSIUM CHLORIDE 20 MEQ
1 PACKET (EA) ORAL
Qty: 0 | Refills: 0 | COMMUNITY

## 2017-05-09 RX ORDER — POTASSIUM CHLORIDE 20 MEQ
40 PACKET (EA) ORAL ONCE
Qty: 0 | Refills: 0 | Status: COMPLETED | OUTPATIENT
Start: 2017-05-09 | End: 2017-05-09

## 2017-05-09 RX ADMIN — CLOPIDOGREL BISULFATE 75 MILLIGRAM(S): 75 TABLET, FILM COATED ORAL at 11:32

## 2017-05-09 RX ADMIN — Medication 81 MILLIGRAM(S): at 11:32

## 2017-05-09 RX ADMIN — CEFTRIAXONE 100 GRAM(S): 500 INJECTION, POWDER, FOR SOLUTION INTRAMUSCULAR; INTRAVENOUS at 05:43

## 2017-05-09 RX ADMIN — ENOXAPARIN SODIUM 40 MILLIGRAM(S): 100 INJECTION SUBCUTANEOUS at 05:45

## 2017-05-09 RX ADMIN — Medication 88 MICROGRAM(S): at 05:45

## 2017-05-09 RX ADMIN — CITALOPRAM 10 MILLIGRAM(S): 10 TABLET, FILM COATED ORAL at 11:32

## 2017-05-09 RX ADMIN — BUDESONIDE AND FORMOTEROL FUMARATE DIHYDRATE 2 PUFF(S): 160; 4.5 AEROSOL RESPIRATORY (INHALATION) at 10:41

## 2017-05-09 RX ADMIN — Medication 1 TABLET(S): at 10:48

## 2017-05-09 RX ADMIN — Medication 40 MILLIEQUIVALENT(S): at 12:14

## 2017-05-11 LAB
CULTURE RESULTS: SIGNIFICANT CHANGE UP
CULTURE RESULTS: SIGNIFICANT CHANGE UP
SPECIMEN SOURCE: SIGNIFICANT CHANGE UP
SPECIMEN SOURCE: SIGNIFICANT CHANGE UP

## 2017-05-12 DIAGNOSIS — R33.8 OTHER RETENTION OF URINE: ICD-10-CM

## 2017-05-12 DIAGNOSIS — N39.0 URINARY TRACT INFECTION, SITE NOT SPECIFIED: ICD-10-CM

## 2017-05-19 ENCOUNTER — APPOINTMENT (OUTPATIENT)
Dept: UROLOGY | Facility: CLINIC | Age: 82
End: 2017-05-19

## 2017-05-19 ENCOUNTER — APPOINTMENT (OUTPATIENT)
Dept: UROLOGY | Facility: CLINIC | Age: 82
End: 2017-05-19
Payer: MEDICARE

## 2017-05-19 PROCEDURE — 51798 US URINE CAPACITY MEASURE: CPT

## 2017-05-19 PROCEDURE — 99213 OFFICE O/P EST LOW 20 MIN: CPT | Mod: 25

## 2017-05-22 ENCOUNTER — APPOINTMENT (OUTPATIENT)
Dept: INTERNAL MEDICINE | Facility: CLINIC | Age: 82
End: 2017-05-22

## 2017-05-22 VITALS
WEIGHT: 174 LBS | HEIGHT: 72 IN | DIASTOLIC BLOOD PRESSURE: 70 MMHG | BODY MASS INDEX: 23.57 KG/M2 | SYSTOLIC BLOOD PRESSURE: 110 MMHG

## 2017-06-05 ENCOUNTER — RX RENEWAL (OUTPATIENT)
Age: 82
End: 2017-06-05

## 2017-06-13 ENCOUNTER — APPOINTMENT (OUTPATIENT)
Dept: PULMONOLOGY | Facility: CLINIC | Age: 82
End: 2017-06-13

## 2017-06-13 ENCOUNTER — OTHER (OUTPATIENT)
Age: 82
End: 2017-06-13

## 2017-06-13 VITALS
HEART RATE: 65 BPM | BODY MASS INDEX: 23.43 KG/M2 | SYSTOLIC BLOOD PRESSURE: 120 MMHG | TEMPERATURE: 97.8 F | DIASTOLIC BLOOD PRESSURE: 70 MMHG | HEIGHT: 72 IN | RESPIRATION RATE: 17 BRPM | WEIGHT: 173 LBS | OXYGEN SATURATION: 94 %

## 2017-06-13 RX ORDER — SULFAMETHOXAZOLE AND TRIMETHOPRIM 800; 160 MG/1; MG/1
800-160 TABLET ORAL
Qty: 14 | Refills: 0 | Status: COMPLETED | COMMUNITY
Start: 2017-05-02

## 2017-06-13 RX ORDER — OXYCODONE AND ACETAMINOPHEN 5; 325 MG/1; MG/1
5-325 TABLET ORAL
Qty: 14 | Refills: 0 | Status: COMPLETED | COMMUNITY
Start: 2017-02-22

## 2017-06-13 RX ORDER — ALBUTEROL SULFATE 2.5 MG/3ML
(2.5 MG/3ML) SOLUTION RESPIRATORY (INHALATION)
Refills: 0 | Status: COMPLETED | COMMUNITY

## 2017-06-13 RX ORDER — LEVOTHYROXINE SODIUM 0.09 MG/1
88 TABLET ORAL
Qty: 90 | Refills: 0 | Status: COMPLETED | COMMUNITY
Start: 2017-01-16

## 2017-06-13 RX ORDER — FAMOTIDINE 20 MG/1
20 TABLET, FILM COATED ORAL
Qty: 60 | Refills: 0 | Status: COMPLETED | COMMUNITY
Start: 2017-02-23

## 2017-06-16 ENCOUNTER — APPOINTMENT (OUTPATIENT)
Dept: UROLOGY | Facility: CLINIC | Age: 82
End: 2017-06-16

## 2017-06-16 DIAGNOSIS — Z85.46 PERSONAL HISTORY OF MALIGNANT NEOPLASM OF PROSTATE: ICD-10-CM

## 2017-06-21 ENCOUNTER — RESULT REVIEW (OUTPATIENT)
Age: 82
End: 2017-06-21

## 2017-06-21 LAB — BACTERIA UR CULT: ABNORMAL

## 2017-06-22 ENCOUNTER — MOBILE ON CALL (OUTPATIENT)
Age: 82
End: 2017-06-22

## 2017-06-22 ENCOUNTER — INPATIENT (INPATIENT)
Facility: HOSPITAL | Age: 82
LOS: 3 days | Discharge: HOME | DRG: 872 | End: 2017-06-26
Attending: HOSPITALIST | Admitting: HOSPITALIST
Payer: MEDICARE

## 2017-06-22 VITALS
DIASTOLIC BLOOD PRESSURE: 82 MMHG | OXYGEN SATURATION: 95 % | RESPIRATION RATE: 18 BRPM | SYSTOLIC BLOOD PRESSURE: 130 MMHG | HEART RATE: 102 BPM | TEMPERATURE: 98 F

## 2017-06-22 DIAGNOSIS — N40.1 BENIGN PROSTATIC HYPERPLASIA WITH LOWER URINARY TRACT SYMPTOMS: ICD-10-CM

## 2017-06-22 DIAGNOSIS — F32.9 MAJOR DEPRESSIVE DISORDER, SINGLE EPISODE, UNSPECIFIED: ICD-10-CM

## 2017-06-22 DIAGNOSIS — Z29.9 ENCOUNTER FOR PROPHYLACTIC MEASURES, UNSPECIFIED: ICD-10-CM

## 2017-06-22 DIAGNOSIS — J44.9 CHRONIC OBSTRUCTIVE PULMONARY DISEASE, UNSPECIFIED: ICD-10-CM

## 2017-06-22 DIAGNOSIS — I63.9 CEREBRAL INFARCTION, UNSPECIFIED: ICD-10-CM

## 2017-06-22 DIAGNOSIS — N39.0 URINARY TRACT INFECTION, SITE NOT SPECIFIED: ICD-10-CM

## 2017-06-22 DIAGNOSIS — E03.9 HYPOTHYROIDISM, UNSPECIFIED: ICD-10-CM

## 2017-06-22 DIAGNOSIS — F10.10 ALCOHOL ABUSE, UNCOMPLICATED: ICD-10-CM

## 2017-06-22 LAB
ALBUMIN SERPL ELPH-MCNC: 3.1 G/DL — LOW (ref 3.3–5)
ALP SERPL-CCNC: 83 U/L — SIGNIFICANT CHANGE UP (ref 40–120)
ALT FLD-CCNC: 23 U/L RC — SIGNIFICANT CHANGE UP (ref 10–45)
ANION GAP SERPL CALC-SCNC: 13 MMOL/L — SIGNIFICANT CHANGE UP (ref 5–17)
APPEARANCE UR: CLEAR — SIGNIFICANT CHANGE UP
AST SERPL-CCNC: 38 U/L — SIGNIFICANT CHANGE UP (ref 10–40)
BACTERIA # UR AUTO: ABNORMAL /HPF
BASE EXCESS BLDV CALC-SCNC: -0.1 MMOL/L — SIGNIFICANT CHANGE UP (ref -2–2)
BASOPHILS # BLD AUTO: 0 K/UL — SIGNIFICANT CHANGE UP (ref 0–0.2)
BASOPHILS NFR BLD AUTO: 0.1 % — SIGNIFICANT CHANGE UP (ref 0–2)
BILIRUB SERPL-MCNC: 0.4 MG/DL — SIGNIFICANT CHANGE UP (ref 0.2–1.2)
BILIRUB UR-MCNC: NEGATIVE — SIGNIFICANT CHANGE UP
BUN SERPL-MCNC: 10 MG/DL — SIGNIFICANT CHANGE UP (ref 7–23)
CA-I SERPL-SCNC: 1.12 MMOL/L — SIGNIFICANT CHANGE UP (ref 1.12–1.3)
CALCIUM SERPL-MCNC: 8.2 MG/DL — LOW (ref 8.4–10.5)
CHLORIDE BLDV-SCNC: 106 MMOL/L — SIGNIFICANT CHANGE UP (ref 96–108)
CHLORIDE SERPL-SCNC: 101 MMOL/L — SIGNIFICANT CHANGE UP (ref 96–108)
CO2 BLDV-SCNC: 25 MMOL/L — SIGNIFICANT CHANGE UP (ref 22–30)
CO2 SERPL-SCNC: 21 MMOL/L — LOW (ref 22–31)
COLOR SPEC: YELLOW — SIGNIFICANT CHANGE UP
CREAT SERPL-MCNC: 0.66 MG/DL — SIGNIFICANT CHANGE UP (ref 0.5–1.3)
DIFF PNL FLD: NEGATIVE — SIGNIFICANT CHANGE UP
EOSINOPHIL # BLD AUTO: 0 K/UL — SIGNIFICANT CHANGE UP (ref 0–0.5)
EOSINOPHIL NFR BLD AUTO: 0.2 % — SIGNIFICANT CHANGE UP (ref 0–6)
EPI CELLS # UR: SIGNIFICANT CHANGE UP /HPF
GAS PNL BLDV: 131 MMOL/L — LOW (ref 136–145)
GAS PNL BLDV: SIGNIFICANT CHANGE UP
GAS PNL BLDV: SIGNIFICANT CHANGE UP
GLUCOSE BLDV-MCNC: 118 MG/DL — HIGH (ref 70–99)
GLUCOSE SERPL-MCNC: 119 MG/DL — HIGH (ref 70–99)
GLUCOSE UR QL: NEGATIVE — SIGNIFICANT CHANGE UP
HCO3 BLDV-SCNC: 24 MMOL/L — SIGNIFICANT CHANGE UP (ref 21–29)
HCT VFR BLD CALC: 36 % — LOW (ref 39–50)
HCT VFR BLDA CALC: 38 % — LOW (ref 39–50)
HGB BLD CALC-MCNC: 12.2 G/DL — LOW (ref 13–17)
HGB BLD-MCNC: 12.1 G/DL — LOW (ref 13–17)
KETONES UR-MCNC: NEGATIVE — SIGNIFICANT CHANGE UP
LACTATE BLDV-MCNC: 1.2 MMOL/L — SIGNIFICANT CHANGE UP (ref 0.7–2)
LEUKOCYTE ESTERASE UR-ACNC: ABNORMAL
LYMPHOCYTES # BLD AUTO: 0.1 K/UL — LOW (ref 1–3.3)
LYMPHOCYTES # BLD AUTO: 1.4 % — LOW (ref 13–44)
MAGNESIUM SERPL-MCNC: 1.6 MG/DL — SIGNIFICANT CHANGE UP (ref 1.6–2.6)
MCHC RBC-ENTMCNC: 30.8 PG — SIGNIFICANT CHANGE UP (ref 27–34)
MCHC RBC-ENTMCNC: 33.7 GM/DL — SIGNIFICANT CHANGE UP (ref 32–36)
MCV RBC AUTO: 91.4 FL — SIGNIFICANT CHANGE UP (ref 80–100)
MONOCYTES # BLD AUTO: 0.1 K/UL — SIGNIFICANT CHANGE UP (ref 0–0.9)
MONOCYTES NFR BLD AUTO: 0.8 % — LOW (ref 2–14)
NEUTROPHILS # BLD AUTO: 8.4 K/UL — HIGH (ref 1.8–7.4)
NEUTROPHILS NFR BLD AUTO: 97.5 % — HIGH (ref 43–77)
NITRITE UR-MCNC: POSITIVE
PCO2 BLDV: 37 MMHG — SIGNIFICANT CHANGE UP (ref 35–50)
PH BLDV: 7.42 — SIGNIFICANT CHANGE UP (ref 7.35–7.45)
PH UR: 7 — SIGNIFICANT CHANGE UP (ref 5–8)
PHOSPHATE SERPL-MCNC: 2.9 MG/DL — SIGNIFICANT CHANGE UP (ref 2.5–4.5)
PLATELET # BLD AUTO: 203 K/UL — SIGNIFICANT CHANGE UP (ref 150–400)
PO2 BLDV: 41 MMHG — SIGNIFICANT CHANGE UP (ref 25–45)
POTASSIUM BLDV-SCNC: 3.5 MMOL/L — SIGNIFICANT CHANGE UP (ref 3.5–5)
POTASSIUM SERPL-MCNC: 3.8 MMOL/L — SIGNIFICANT CHANGE UP (ref 3.5–5.3)
POTASSIUM SERPL-SCNC: 3.8 MMOL/L — SIGNIFICANT CHANGE UP (ref 3.5–5.3)
PROT SERPL-MCNC: 6.2 G/DL — SIGNIFICANT CHANGE UP (ref 6–8.3)
PROT UR-MCNC: NEGATIVE — SIGNIFICANT CHANGE UP
RBC # BLD: 3.93 M/UL — LOW (ref 4.2–5.8)
RBC # FLD: 13.1 % — SIGNIFICANT CHANGE UP (ref 10.3–14.5)
SAO2 % BLDV: 72 % — SIGNIFICANT CHANGE UP (ref 67–88)
SODIUM SERPL-SCNC: 135 MMOL/L — SIGNIFICANT CHANGE UP (ref 135–145)
SP GR SPEC: 1.01 — SIGNIFICANT CHANGE UP (ref 1.01–1.02)
T3 SERPL-MCNC: 68 NG/DL — LOW (ref 80–200)
T4 AB SER-ACNC: 6.6 UG/DL — SIGNIFICANT CHANGE UP (ref 4.6–12)
TSH SERPL-MCNC: 1.52 UIU/ML — SIGNIFICANT CHANGE UP (ref 0.27–4.2)
UROBILINOGEN FLD QL: NEGATIVE — SIGNIFICANT CHANGE UP
WBC # BLD: 8.7 K/UL — SIGNIFICANT CHANGE UP (ref 3.8–10.5)
WBC # FLD AUTO: 8.7 K/UL — SIGNIFICANT CHANGE UP (ref 3.8–10.5)
WBC UR QL: SIGNIFICANT CHANGE UP /HPF (ref 0–5)

## 2017-06-22 PROCEDURE — 99223 1ST HOSP IP/OBS HIGH 75: CPT | Mod: GC

## 2017-06-22 PROCEDURE — 93010 ELECTROCARDIOGRAM REPORT: CPT

## 2017-06-22 PROCEDURE — 99285 EMERGENCY DEPT VISIT HI MDM: CPT | Mod: 25,GC

## 2017-06-22 RX ORDER — LEVOTHYROXINE SODIUM 125 MCG
75 TABLET ORAL DAILY
Qty: 0 | Refills: 0 | Status: DISCONTINUED | OUTPATIENT
Start: 2017-06-22 | End: 2017-06-26

## 2017-06-22 RX ORDER — HEPARIN SODIUM 5000 [USP'U]/ML
5000 INJECTION INTRAVENOUS; SUBCUTANEOUS EVERY 8 HOURS
Qty: 0 | Refills: 0 | Status: DISCONTINUED | OUTPATIENT
Start: 2017-06-22 | End: 2017-06-26

## 2017-06-22 RX ORDER — LEVOTHYROXINE SODIUM 125 MCG
1 TABLET ORAL
Qty: 0 | Refills: 0 | COMMUNITY

## 2017-06-22 RX ORDER — CITALOPRAM 10 MG/1
10 TABLET, FILM COATED ORAL DAILY
Qty: 0 | Refills: 0 | Status: DISCONTINUED | OUTPATIENT
Start: 2017-06-22 | End: 2017-06-26

## 2017-06-22 RX ORDER — BUDESONIDE AND FORMOTEROL FUMARATE DIHYDRATE 160; 4.5 UG/1; UG/1
2 AEROSOL RESPIRATORY (INHALATION)
Qty: 0 | Refills: 0 | Status: DISCONTINUED | OUTPATIENT
Start: 2017-06-22 | End: 2017-06-26

## 2017-06-22 RX ORDER — AZTREONAM 2 G
1 VIAL (EA) INJECTION
Qty: 0 | Refills: 0 | COMMUNITY

## 2017-06-22 RX ORDER — CEFTRIAXONE 500 MG/1
1 INJECTION, POWDER, FOR SOLUTION INTRAMUSCULAR; INTRAVENOUS EVERY 24 HOURS
Qty: 0 | Refills: 0 | Status: DISCONTINUED | OUTPATIENT
Start: 2017-06-23 | End: 2017-06-26

## 2017-06-22 RX ORDER — CITALOPRAM 10 MG/1
1 TABLET, FILM COATED ORAL
Qty: 0 | Refills: 0 | COMMUNITY

## 2017-06-22 RX ORDER — ONDANSETRON 8 MG/1
4 TABLET, FILM COATED ORAL ONCE
Qty: 0 | Refills: 0 | Status: COMPLETED | OUTPATIENT
Start: 2017-06-22 | End: 2017-06-22

## 2017-06-22 RX ORDER — SODIUM CHLORIDE 9 MG/ML
1000 INJECTION INTRAMUSCULAR; INTRAVENOUS; SUBCUTANEOUS ONCE
Qty: 0 | Refills: 0 | Status: COMPLETED | OUTPATIENT
Start: 2017-06-22 | End: 2017-06-22

## 2017-06-22 RX ORDER — ASPIRIN/CALCIUM CARB/MAGNESIUM 324 MG
81 TABLET ORAL DAILY
Qty: 0 | Refills: 0 | Status: DISCONTINUED | OUTPATIENT
Start: 2017-06-22 | End: 2017-06-26

## 2017-06-22 RX ORDER — THIAMINE MONONITRATE (VIT B1) 100 MG
100 TABLET ORAL DAILY
Qty: 0 | Refills: 0 | Status: DISCONTINUED | OUTPATIENT
Start: 2017-06-22 | End: 2017-06-26

## 2017-06-22 RX ORDER — TAMSULOSIN HYDROCHLORIDE 0.4 MG/1
1 CAPSULE ORAL
Qty: 0 | Refills: 0 | COMMUNITY

## 2017-06-22 RX ORDER — IPRATROPIUM/ALBUTEROL SULFATE 18-103MCG
3 AEROSOL WITH ADAPTER (GRAM) INHALATION EVERY 6 HOURS
Qty: 0 | Refills: 0 | Status: DISCONTINUED | OUTPATIENT
Start: 2017-06-22 | End: 2017-06-26

## 2017-06-22 RX ORDER — TAMSULOSIN HYDROCHLORIDE 0.4 MG/1
0.4 CAPSULE ORAL AT BEDTIME
Qty: 0 | Refills: 0 | Status: DISCONTINUED | OUTPATIENT
Start: 2017-06-22 | End: 2017-06-26

## 2017-06-22 RX ORDER — ALBUTEROL 90 UG/1
2 AEROSOL, METERED ORAL
Qty: 0 | Refills: 0 | COMMUNITY

## 2017-06-22 RX ORDER — CLOPIDOGREL BISULFATE 75 MG/1
75 TABLET, FILM COATED ORAL
Qty: 0 | Refills: 0 | Status: DISCONTINUED | OUTPATIENT
Start: 2017-06-22 | End: 2017-06-26

## 2017-06-22 RX ORDER — ACETAMINOPHEN 500 MG
1000 TABLET ORAL ONCE
Qty: 0 | Refills: 0 | Status: COMPLETED | OUTPATIENT
Start: 2017-06-22 | End: 2017-06-22

## 2017-06-22 RX ORDER — CEFTRIAXONE 500 MG/1
1 INJECTION, POWDER, FOR SOLUTION INTRAMUSCULAR; INTRAVENOUS ONCE
Qty: 0 | Refills: 0 | Status: COMPLETED | OUTPATIENT
Start: 2017-06-22 | End: 2017-06-22

## 2017-06-22 RX ORDER — DOXAZOSIN MESYLATE 4 MG
4 TABLET ORAL AT BEDTIME
Qty: 0 | Refills: 0 | Status: DISCONTINUED | OUTPATIENT
Start: 2017-06-22 | End: 2017-06-26

## 2017-06-22 RX ADMIN — Medication 400 MILLIGRAM(S): at 12:10

## 2017-06-22 RX ADMIN — Medication 81 MILLIGRAM(S): at 16:27

## 2017-06-22 RX ADMIN — BUDESONIDE AND FORMOTEROL FUMARATE DIHYDRATE 2 PUFF(S): 160; 4.5 AEROSOL RESPIRATORY (INHALATION) at 17:29

## 2017-06-22 RX ADMIN — ONDANSETRON 4 MILLIGRAM(S): 8 TABLET, FILM COATED ORAL at 12:10

## 2017-06-22 RX ADMIN — TAMSULOSIN HYDROCHLORIDE 0.4 MILLIGRAM(S): 0.4 CAPSULE ORAL at 21:16

## 2017-06-22 RX ADMIN — Medication 1 TABLET(S): at 21:16

## 2017-06-22 RX ADMIN — Medication 75 MICROGRAM(S): at 16:27

## 2017-06-22 RX ADMIN — SODIUM CHLORIDE 1000 MILLILITER(S): 9 INJECTION INTRAMUSCULAR; INTRAVENOUS; SUBCUTANEOUS at 18:33

## 2017-06-22 RX ADMIN — SODIUM CHLORIDE 1000 MILLILITER(S): 9 INJECTION INTRAMUSCULAR; INTRAVENOUS; SUBCUTANEOUS at 11:41

## 2017-06-22 RX ADMIN — Medication 4 MILLIGRAM(S): at 19:34

## 2017-06-22 RX ADMIN — HEPARIN SODIUM 5000 UNIT(S): 5000 INJECTION INTRAVENOUS; SUBCUTANEOUS at 21:16

## 2017-06-22 RX ADMIN — CITALOPRAM 10 MILLIGRAM(S): 10 TABLET, FILM COATED ORAL at 21:16

## 2017-06-22 RX ADMIN — Medication 100 MILLIGRAM(S): at 21:16

## 2017-06-22 RX ADMIN — CEFTRIAXONE 100 GRAM(S): 500 INJECTION, POWDER, FOR SOLUTION INTRAMUSCULAR; INTRAVENOUS at 12:10

## 2017-06-22 NOTE — H&P ADULT - PROBLEM SELECTOR PLAN 1
-Pt given ceftriaxone; outpatient ucx showing >100K CNS  -No sensitivities available from culture; pt feels better s/p abx and ivf  -Third gen Cephalosporin with some activity against GPC, can probably continue and f/u repeat UCx.  If pt worsens consider switching to Vanco  -f/u bcx -Pt given ceftriaxone; outpatient ucx showing >100K CNS  -Prior admission given course of ceftriaxone with improvement of sx; culture without growth of pathologic species on that admission.  -No sensitivities available from outpatient culture; pt feels better s/p abx and ivf  -Third gen Cephalosporin with some activity against GPC, can probably continue and f/u repeat UCx.  If pt worsens consider switching to Vanco, especially if staph haemolyticus found  -f/u bcx -Sepsis 2/2 complicated UTI  -Pt given Ceftriaxone; outpatient UCx showing >100K Coag Neg Staph  -Prior admission given course of Ceftriaxone with improvement of symptoms; culture without growth of pathologic species on that admission.  -No sensitivities available from outpatient culture; pt feels better s/p ABXs and IVF (ordered 2nd L as pt septic)  -Third gen Cephalosporin with some activity against GPC, will continue and f/u repeat   -UCx.  If pt worsens consider switching to Vanco, especially if staph haemolyticus found  -f/u BCx

## 2017-06-22 NOTE — H&P ADULT - NSHPSOCIALHISTORY_GEN_ALL_CORE
Social History:    Marital Status:  (x   )    (   ) Single    (   )    (  )   Occupation: retired welding buisiness owner,   Lives with: (  ) alone  (  ) children   (x  ) spouse   (  ) parents  (  ) other    Substance Use (street drugs): (x  ) never used  (  ) other:  Tobacco Usage:  (   ) never smoked   ( x  ) former smoker   (   ) current smoker  (     ) pack year  (        ) last cigarette date  Alcohol Usage: daily, 2 glasses per pt, multiple glasses per wife; pt does not feel it is a problem, but wife feels it is. Social History:    Marital Status:  ( x )    (   ) Single    (   )    (  )   Occupation: retired welding buisiness owner,   Lives with: (  ) alone  (  ) children   (x  ) spouse   (  ) parents  (  ) other    Substance Use (street drugs): (x  ) never used  (  ) other:  Tobacco Usage:  (   ) never smoked   ( x  ) former smoker   (   ) current smoker  (     ) pack year  (        ) last cigarette date  Alcohol Usage: daily, 2 glasses per pt, multiple glasses per wife; pt does not feel it is a problem, but wife feels it is.

## 2017-06-22 NOTE — H&P ADULT - NSHPOUTPATIENTPROVIDERS_GEN_ALL_CORE
PCP: Franchesca  Uro: Mcclelland PCP: Dr. Sorensen  Urology: Dr. Mcclelland PCP: Dr. Sorensen / Outpatient Urology: Dr. Mcclelland

## 2017-06-22 NOTE — ED ADULT NURSE NOTE - OBJECTIVE STATEMENT
Pt c/o fever and burning upon urination. Pt was started on antibiotics by his urologist on 6/21/17 but stated he got sick having dry heaves and discomfort in his stomach.

## 2017-06-22 NOTE — H&P ADULT - PROBLEM SELECTOR PLAN 7
-hsq -Will place on sx triggered CIWA, no evidence of withdrawal currently but reported multiple glasses of wine daily  -SW consult

## 2017-06-22 NOTE — H&P ADULT - PROBLEM SELECTOR PLAN 6
-remote cva, appears pt is on plavix 75 twice weekly (wed/sat) and asa, will continue for now as no overt bleeding -Remote CVA, appears pt is on Plavix 75 twice weekly (wed/sat) and ASA daily, will continue for now as no overt bleeding

## 2017-06-22 NOTE — ED PROVIDER NOTE - OBJECTIVE STATEMENT
81 y/o male with a PMH asthma, HTN BIBA p/w headache and nausea and weakness. Patient recently diagnosed with a UTI yesterday and started on bactrim. Per EMS and family, patient had a temperature of 100.3 today (100.1 rectally here). EMS started fluids and gave 4mg zofran. Patient only took 1 dose of the bactrim and reported it made him sick. Patient never vomited but felt very nauseous. Denies any cough, chest pain, difficulty breathing, vomiting, diarrhea, abdominal pain, paresthesias, hematuria. States dysuria is improving and urine today was clear yellow. 81 y/o male with a PMH asthma, HTN BIBA p/w headache and nausea and weakness. Patient recently diagnosed with a UTI yesterday and started on bactrim. Per EMS and family, patient had a temperature of 100.7 orally overnight (100.1 rectally here). EMS started fluids for tachycardia and gave 4mg zofran. Patient only took 1 dose of the bactrim and reported it made him sick. Patient never vomited but felt very nauseous. Denies any cough, chest pain, difficulty breathing, vomiting, diarrhea, abdominal pain, paresthesias, hematuria. States dysuria is improving and urine today was clear yellow.   Urologist: Dr. Mcclelland 81 y/o male with a PMH asthma, HTN BIBA p/w headache and nausea and weakness. Patient recently diagnosed with a UTI yesterday and started on bactrim. Per EMS and family, patient had a temperature of 100.7 orally overnight (100.1 rectally here). EMS started fluids for tachycardia and gave 4mg zofran. Patient only took 1 dose of the bactrim and reported it made him sick. Patient never vomited but felt very nauseous w/ vague abdominal pain w/ retching. Denies any cough, chest pain, difficulty breathing, vomiting, diarrhea, paresthesias, hematuria. States dysuria is improving and urine today was clear yellow. Patient only took 1 dose of the bactrim last night.  Urologist: Dr. Mcclelland

## 2017-06-22 NOTE — H&P ADULT - ASSESSMENT
82 M PMH prostate ca s/p RT remotely, BPH, emphysema/asthma, hypothyroid, chronic LBP, prior UTI with admission in May 2017 for urosepsis, presents with fever x 1-2 days, f/w UTI. 82 M PMH prostate ca s/p RT remotely, BPH, emphysema/asthma, hypothyroid, chronic LBP, prior UTI with admission in May 2017 for urosepsis, presents with fever x 1-2 days, f/w sepsis 2/2 UTI. 82M w/ PMHx of Prostate Ca (s/p RT), BPH, Emphysema/Asthma, Hypothyroid, Chronic LBP, prior UTI with admission in 5/2017 for sepsis 2/2 UTI, p/w fever x 1-2 days, found to be tachycardic - SIRS w/ + UA - sepsis 2/2 complicated UTI (male). No signs of end organ dysfunction. Patient also has been drinking recently - amount of EtOH intake is concerning to wife. 82M w/ PMHx of Prostate Ca (s/p RT), BPH, Emphysema/Asthma, Hypothyroid, Chronic LBP, prior UTI with admission in 5/2017 for sepsis 2/2 UTI, p/w fever x 1-2 days, found to be tachycardic - SIRS w/ + UA - sepsis 2/2 complicated UTI (male). No signs of end organ dysfunction. Patient also has been drinking recently - amount of EtOH intake is unclear - wife is concerned.

## 2017-06-22 NOTE — H&P ADULT - NSHPPHYSICALEXAM_GEN_ALL_CORE
PHYSICAL EXAM:  GENERAL: NAD, well-groomed, well-developed, overall frail.   HEAD:  Atraumatic, Normocephalic  EYES: EOMI, PERRLA, conjunctiva and sclera clear  ENMT: No tonsillar erythema, exudates, or enlargement; Moist mucous membranes.  NECK: Supple, No JVD, Normal thyroid  CHEST/LUNG: mild exp wheeze at bases otherwise clear.  Pt not using accessory muscles  HEART: Regular rate and rhythm; No murmurs, rubs, or gallops  ABDOMEN: Soft, Nontender, Nondistended; Bowel sounds present  EXTREMITIES:  2+ Peripheral Pulses, 1+ non pitting edema b/l LE  LYMPH: No lymphadenopathy noted  SKIN: No rashes or lesions  NERVOUS SYSTEM:  Alert & Oriented X3, Good concentration; Motor Strength 5/5 B/L upper and lower extremities; DTRs 2+ intact and symmetric Vital Signs Last 24 Hrs: T(F): 98, Max: 100.1 (06-22 @ 10:00) / HR: 96 (82 - 104) / BP: 164/89 (130/82 - 169/96) / RR: 18 (16 - 18) / SpO2: 95% (94% - 97%)    PHYSICAL EXAM:  GENERAL: NAD, well-groomed, well-developed, overall frail.   HEAD:  Atraumatic, Normocephalic  EYES: EOMI, PERRLA, conjunctiva and sclera clear  ENMT: No tonsillar erythema, exudates, or enlargement; Moist mucous membranes.  NECK: Supple, No JVD, Normal thyroid  CHEST/LUNG: mild exp wheeze at bases otherwise clear.  Pt not using accessory muscles  HEART: Regular rate and rhythm; No murmurs, rubs, or gallops  ABDOMEN: Soft, Nontender, Nondistended; Bowel sounds present  EXTREMITIES:  2+ Peripheral Pulses, 1+ non pitting edema b/l LE  LYMPH: No lymphadenopathy noted  SKIN: No rashes or lesions  NERVOUS SYSTEM:  Alert & Oriented X3, Good concentration; Motor Strength 5/5 B/L upper and lower extremities; DTRs 2+ intact and symmetric Vital Signs Last 24 Hrs: T(F): 98, Max: 100.1 (06-22 @ 10:00) / HR: 96 (82 - 104) / BP: 164/89 (130/82 - 169/96) / RR: 18 (16 - 18) / SpO2: 95% (94% - 97%)    PHYSICAL EXAM:  GENERAL: NAD, well-groomed, well-developed, overall frail, AOx2-3 (name, location, year, not month), no tremor or tongue fasciculations  HEAD:  Atraumatic, Normocephalic  EYES: EOMI, PERRLA, conjunctiva and sclera clear  ENMT: No tonsillar erythema, exudates, or enlargement; Moist mucous membranes.  NECK: Supple, No JVD, Normal thyroid  CHEST/LUNG: mild exp wheeze at bases otherwise clear.  Pt not using accessory muscles  HEART: Regular rate and rhythm; No murmurs, rubs, or gallops  ABDOMEN: Soft, Nontender, Nondistended; Bowel sounds present, + suprapubic tenderness  EXTREMITIES:  2+ Peripheral Pulses, 1+ non pitting edema b/l LE  LYMPH: No lymphadenopathy noted  SKIN: No rashes or lesions  NERVOUS SYSTEM:  Good concentration; Motor Strength 5/5 B/L upper and lower extremities; DTRs 2+ intact and symmetric

## 2017-06-22 NOTE — ED PROVIDER NOTE - MEDICAL DECISION MAKING DETAILS
[place] resident: 83 yo male with BPH and asthma dx with UTI yesterday by Dr. Mcclelland (Urology) and started on bactrim p/w nausea, retching, and vague abdominal pain after taking 1 dose of Bactrim - never taken this medicaiton before - no vomiting or diarrhea - febrile orally to 100.7 last night - did not take tylenol - likely a reaction to the medicaiton, but with the nausea and retching will check EKG, repeat urine, culture, hydrate for tachycardia in EMS, and consider d/c with zofran vs new antibiotic for known UTI [place] resident: 83 yo male with BPH and asthma dx with UTI yesterday by Dr. Mcclelland (Urology) and started on bactrim p/w nausea, retching, and vague abdominal pain after taking 1 dose of Bactrim - never taken this medicaiton before - no vomiting or diarrhea - febrile orally to 100.7 last night - did not take tylenol - likely a reaction to the medicaiton, but with the nausea and retching will check EKG, repeat urine, culture, hydrate for tachycardia in EMS, and consider d/c with zofran vs new antibiotic for known UTI  ATTD: uti concern for worsening infection, on abx. will check labs, check urine, start iv abx,. likely admit to hospital for further care.

## 2017-06-22 NOTE — H&P ADULT - NSHPLABSRESULTS_GEN_ALL_CORE
Labs reviewed by me: no leukocytosis (wbc 8.7 though relative inc compared to baseline of ~3 chronically), stable chronic anemia (hgb 12.1), unremarkable cmp, +UA. O/P urine culture showed 100K CNS 6/16/17  Prior Ucx 5/2017 show <10K normal uro eugenia and bcx negative from May.     EKG reviewed by me with sinus tach 100 bpm, LAD. Labs: Personally reviewed: no leukocytosis (wbc 8.7 though relative increase compared to baseline of ~3 chronically, left shift, no bands), stable chronic anemia (Hb 12.1 @ baseline), unremarkable CMP (Cr:0.66 @ baseline), +UA (WBC:26-50, Few bacteria, Large LE, Positive:Nitrite). Outpatient UCx showed 100K CNS 6/16/17  Prior UCx 5/2017 show <10K normal uro eugenia and BCx negative from May.    BCx x 2 and UCx from this admission pending     EKG: Personally reviewed: sinus tach @ 100 bpm, LAD    CXR: Personally reviewed: Official read: IMPRESSION: Right  basilar  linear  atelectasis. Labs: Personally reviewed: no leukocytosis (wbc 8.7 though relative increase compared to baseline of ~3 chronically, left shift, no bands), stable chronic anemia (Hb 12.1 @ baseline), unremarkable CMP (Cr:0.66 @ baseline), +UA (WBC:26-50, Few bacteria, Large LE, Positive: Nitrite). Outpatient UCx showed 100K CNS 6/16/17  Prior UCx 5/2017 show <10K normal uro eugenia and BCx negative from May.    BCx x 2 and UCx from this admission pending     EKG: Personally reviewed: sinus tach @ 100 bpm, LAD    CXR: Personally reviewed: Official read: IMPRESSION: Right  basilar  linear  atelectasis.

## 2017-06-22 NOTE — ED PROVIDER NOTE - ATTENDING CONTRIBUTION TO CARE
81 y/o m with pmhx asthma, BPH, HTN, hyperthyroid, prostate ca presents for concern of worsening UTI. was seen last week at his Urologist office, had urine done. yesterday had follow up with results and started on bactrim. This am, wife found temp to be 100.7. no vomiting no cough no abd pain.  (100.1 rectally here).  no recent travel. Last admitted in May with UTI and sepsis. wife concerned same was occurring so came to ER today.   Urologist Dr. Mcclelland.  PMD Dr. David.  Gen. no acute distress, Non toxic   HEENT: EOMI, mmm,   Lungs: CTAB/L no C/ W /R   CVS: S1S2   Abd; Soft non tender, non distended   Ext: no edema    done with nurse present in room. circum. no masses or lesions. non tender.  Neuro AAOx3 non focal clear speech

## 2017-06-22 NOTE — H&P ADULT - HISTORY OF PRESENT ILLNESS
82 M PMH prostate ca s/p RT remotely, BPH, emphysema/asthma, hypothyroid, chronic LBP, prior UTI with admission in May 2017 for urosepsis, presents with fever x 1-2 days.  Tm at home 100.7. Pt with c/o of nausea, generally feeling unwell.  +dysuria for unknown time. Given bactrim by Uro office, took first dose last night. Denies CP/SOB, denies abd pain, denies back pain, denies cough.  Seen by urologist 6/16 where PVR noted to be 370; UCx drawn given hx and flomax restarted at that time.  Ucx showed >100K CNS, no sensitivities available. Came to Ed for fever.      In ED: VS: Tm 100.1 rectally, 102, 130/82, 18, 95% RA.  Labs: no leukocytosis (wbc 8.7 though relative inc compared to baseline of ~3 chronically), stable chronic anemia (hgb 12.1), unremarkable cmp, +UA.  Prior Ucx show <10K normal uro eugenia and bcx negative from May. EKG with sinus tach 100 bpm, LAD. 82 M PMH prostate ca s/p RT remotely, BPH, emphysema/asthma, hypothyroid, chronic LBP, prior UTI with admission in May 2017 for urosepsis, presents with fever x 1-2 days.  Tm at home 100.7. Pt with c/o of nausea, generally feeling unwell.  +dysuria for unknown time. Given bactrim by Uro office, took first dose last night. Denies CP/SOB, denies abd pain, denies back pain, denies cough.  Seen by urologist 6/16 where PVR noted to be 370; UCx drawn given hx and flomax restarted at that time.  Ucx showed >100K CNS, no sensitivities available. Came to Ed for fever.  No recent instrumentation or lyon.     In ED: VS: Tm 100.1 rectally, 102, 130/82, 18, 95% RA.  Labs: no leukocytosis (wbc 8.7 though relative inc compared to baseline of ~3 chronically), stable chronic anemia (hgb 12.1), unremarkable cmp, +UA.  Prior Ucx show <10K normal uro eugenia and bcx negative from May. EKG with sinus tach 100 bpm, LAD.  Received 1L NS, ceftriaxone, zofran, tylenol in ED. 82 M PMH prostate ca s/p RT remotely, BPH, emphysema/asthma, hypothyroid, chronic LBP, prior UTI with admission in May 2017 for urosepsis, presents with fever x 1-2 days.  Tm at home 100.7. Pt with c/o of nausea, generally feeling unwell.  +dysuria for unknown time. Given bactrim by Uro office, took first dose last night. Denies CP/SOB, denies abd pain, denies back pain, denies cough.  Seen by urologist 6/16 where PVR noted to be 370; UCx drawn given hx and flomax restarted at that time.  Ucx showed >100K CNS, no sensitivities available. Came to Ed for fever.  No recent instrumentation or lyon. Pt lives at home with wife, ambulatory without assistance (shuffling gait), and still drives/independent of adls.  No falls.     In ED: VS: Tm 100.1 rectally, 102, 130/82, 18, 95% RA.  Labs: no leukocytosis (wbc 8.7 though relative inc compared to baseline of ~3 chronically), stable chronic anemia (hgb 12.1), unremarkable cmp, +UA.  Prior Ucx show <10K normal uro eugenia and bcx negative from May. EKG with sinus tach 100 bpm, LAD.  Received 1L NS, ceftriaxone, zofran, tylenol in ED. 82M w/ PMH of Prostate Ca (s/p RT), BPH, Emphysema/Asthma, Hypothyroid, Chronic LBP, prior UTI with admission in 5/2017 for sepsis 2/2 UTI, p/w fever x 1-2 days.  Tm 100.7 @ home. Pt with c/o of nausea, generally feeling unwell.  +Dysuria for unknown time. Given Bactrim by Urologist, took first dose last night. Denies CP/SOB, denies abd pain, denies back pain, denies cough.  He was seen by his Urologist on 6/16 where PVR noted to be 370; UCx drawn given hx and Flomax restarted at that time.  UCx showed >100K Coag Neg Staph, no sensitivities available. Came to ED for fever.  No recent instrumentation or Flynn. Pt lives at home with wife, ambulatory without assistance (shuffling gait), and still drives/independent of adls.  No falls.     ED Vitals: Tm:100.1 (rectal), 102, 130/82, 18, 95% RA.      Labs: no leukocytosis (wbc 8.7 though relative inc compared to baseline of ~3 chronically), stable chronic anemia (hgb 12.1), unremarkable cmp, +UA.  Prior Ucx show <10K normal uro eugenia and BCx negative from May 2017. EKG with sinus tach 100 bpm, LAD.  Received 1L NS, Ceftriaxone, Zofran and Tylenol in ED.

## 2017-06-23 LAB
ALBUMIN SERPL ELPH-MCNC: 2.9 G/DL — LOW (ref 3.3–5)
ALP SERPL-CCNC: 80 U/L — SIGNIFICANT CHANGE UP (ref 40–120)
ALT FLD-CCNC: 25 U/L — SIGNIFICANT CHANGE UP (ref 10–45)
ANION GAP SERPL CALC-SCNC: 11 MMOL/L — SIGNIFICANT CHANGE UP (ref 5–17)
ANION GAP SERPL CALC-SCNC: 15 MMOL/L — SIGNIFICANT CHANGE UP (ref 5–17)
ANION GAP SERPL CALC-SCNC: 9 MMOL/L — SIGNIFICANT CHANGE UP (ref 5–17)
AST SERPL-CCNC: 55 U/L — HIGH (ref 10–40)
BILIRUB DIRECT SERPL-MCNC: 0.2 MG/DL — SIGNIFICANT CHANGE UP (ref 0–0.2)
BILIRUB INDIRECT FLD-MCNC: 0.2 MG/DL — SIGNIFICANT CHANGE UP (ref 0.2–1)
BILIRUB SERPL-MCNC: 0.4 MG/DL — SIGNIFICANT CHANGE UP (ref 0.2–1.2)
BUN SERPL-MCNC: 11 MG/DL — SIGNIFICANT CHANGE UP (ref 7–23)
BUN SERPL-MCNC: 13 MG/DL — SIGNIFICANT CHANGE UP (ref 7–23)
BUN SERPL-MCNC: 8 MG/DL — SIGNIFICANT CHANGE UP (ref 7–23)
CALCIUM SERPL-MCNC: 8 MG/DL — LOW (ref 8.4–10.5)
CALCIUM SERPL-MCNC: 8.1 MG/DL — LOW (ref 8.4–10.5)
CALCIUM SERPL-MCNC: 8.1 MG/DL — LOW (ref 8.4–10.5)
CHLORIDE SERPL-SCNC: 101 MMOL/L — SIGNIFICANT CHANGE UP (ref 96–108)
CHLORIDE SERPL-SCNC: 104 MMOL/L — SIGNIFICANT CHANGE UP (ref 96–108)
CHLORIDE SERPL-SCNC: 104 MMOL/L — SIGNIFICANT CHANGE UP (ref 96–108)
CO2 SERPL-SCNC: 21 MMOL/L — LOW (ref 22–31)
CO2 SERPL-SCNC: 22 MMOL/L — SIGNIFICANT CHANGE UP (ref 22–31)
CO2 SERPL-SCNC: 23 MMOL/L — SIGNIFICANT CHANGE UP (ref 22–31)
CREAT SERPL-MCNC: 0.6 MG/DL — SIGNIFICANT CHANGE UP (ref 0.5–1.3)
CREAT SERPL-MCNC: 0.61 MG/DL — SIGNIFICANT CHANGE UP (ref 0.5–1.3)
CREAT SERPL-MCNC: 0.71 MG/DL — SIGNIFICANT CHANGE UP (ref 0.5–1.3)
CULTURE RESULTS: SIGNIFICANT CHANGE UP
GLUCOSE SERPL-MCNC: 100 MG/DL — HIGH (ref 70–99)
GLUCOSE SERPL-MCNC: 105 MG/DL — HIGH (ref 70–99)
GLUCOSE SERPL-MCNC: 115 MG/DL — HIGH (ref 70–99)
HCT VFR BLD CALC: 33.5 % — LOW (ref 39–50)
HGB BLD-MCNC: 11.2 G/DL — LOW (ref 13–17)
MAGNESIUM SERPL-MCNC: 1.7 MG/DL — SIGNIFICANT CHANGE UP (ref 1.6–2.6)
MAGNESIUM SERPL-MCNC: 1.7 MG/DL — SIGNIFICANT CHANGE UP (ref 1.6–2.6)
MCHC RBC-ENTMCNC: 30.5 PG — SIGNIFICANT CHANGE UP (ref 27–34)
MCHC RBC-ENTMCNC: 33.3 GM/DL — SIGNIFICANT CHANGE UP (ref 32–36)
MCV RBC AUTO: 91.6 FL — SIGNIFICANT CHANGE UP (ref 80–100)
PHOSPHATE SERPL-MCNC: 2.4 MG/DL — LOW (ref 2.5–4.5)
PHOSPHATE SERPL-MCNC: 2.4 MG/DL — LOW (ref 2.5–4.5)
PLATELET # BLD AUTO: 161 K/UL — SIGNIFICANT CHANGE UP (ref 150–400)
POTASSIUM SERPL-MCNC: 3.6 MMOL/L — SIGNIFICANT CHANGE UP (ref 3.5–5.3)
POTASSIUM SERPL-MCNC: 3.6 MMOL/L — SIGNIFICANT CHANGE UP (ref 3.5–5.3)
POTASSIUM SERPL-MCNC: 4 MMOL/L — SIGNIFICANT CHANGE UP (ref 3.5–5.3)
POTASSIUM SERPL-SCNC: 3.6 MMOL/L — SIGNIFICANT CHANGE UP (ref 3.5–5.3)
POTASSIUM SERPL-SCNC: 3.6 MMOL/L — SIGNIFICANT CHANGE UP (ref 3.5–5.3)
POTASSIUM SERPL-SCNC: 4 MMOL/L — SIGNIFICANT CHANGE UP (ref 3.5–5.3)
PROT SERPL-MCNC: 6.3 G/DL — SIGNIFICANT CHANGE UP (ref 6–8.3)
RBC # BLD: 3.65 M/UL — LOW (ref 4.2–5.8)
RBC # FLD: 13.2 % — SIGNIFICANT CHANGE UP (ref 10.3–14.5)
SODIUM SERPL-SCNC: 135 MMOL/L — SIGNIFICANT CHANGE UP (ref 135–145)
SODIUM SERPL-SCNC: 137 MMOL/L — SIGNIFICANT CHANGE UP (ref 135–145)
SODIUM SERPL-SCNC: 138 MMOL/L — SIGNIFICANT CHANGE UP (ref 135–145)
SPECIMEN SOURCE: SIGNIFICANT CHANGE UP
WBC # BLD: 3.8 K/UL — SIGNIFICANT CHANGE UP (ref 3.8–10.5)
WBC # FLD AUTO: 3.8 K/UL — SIGNIFICANT CHANGE UP (ref 3.8–10.5)

## 2017-06-23 PROCEDURE — 99232 SBSQ HOSP IP/OBS MODERATE 35: CPT | Mod: GC

## 2017-06-23 RX ORDER — SENNA PLUS 8.6 MG/1
2 TABLET ORAL AT BEDTIME
Qty: 0 | Refills: 0 | Status: DISCONTINUED | OUTPATIENT
Start: 2017-06-23 | End: 2017-06-26

## 2017-06-23 RX ORDER — DOCUSATE SODIUM 100 MG
100 CAPSULE ORAL
Qty: 0 | Refills: 0 | Status: DISCONTINUED | OUTPATIENT
Start: 2017-06-23 | End: 2017-06-26

## 2017-06-23 RX ORDER — ACETAMINOPHEN 500 MG
650 TABLET ORAL ONCE
Qty: 0 | Refills: 0 | Status: COMPLETED | OUTPATIENT
Start: 2017-06-23 | End: 2017-06-23

## 2017-06-23 RX ADMIN — HEPARIN SODIUM 5000 UNIT(S): 5000 INJECTION INTRAVENOUS; SUBCUTANEOUS at 21:31

## 2017-06-23 RX ADMIN — Medication 650 MILLIGRAM(S): at 21:22

## 2017-06-23 RX ADMIN — Medication 75 MICROGRAM(S): at 05:12

## 2017-06-23 RX ADMIN — Medication 81 MILLIGRAM(S): at 13:02

## 2017-06-23 RX ADMIN — Medication 650 MILLIGRAM(S): at 20:52

## 2017-06-23 RX ADMIN — BUDESONIDE AND FORMOTEROL FUMARATE DIHYDRATE 2 PUFF(S): 160; 4.5 AEROSOL RESPIRATORY (INHALATION) at 18:28

## 2017-06-23 RX ADMIN — BUDESONIDE AND FORMOTEROL FUMARATE DIHYDRATE 2 PUFF(S): 160; 4.5 AEROSOL RESPIRATORY (INHALATION) at 05:13

## 2017-06-23 RX ADMIN — TAMSULOSIN HYDROCHLORIDE 0.4 MILLIGRAM(S): 0.4 CAPSULE ORAL at 21:32

## 2017-06-23 RX ADMIN — Medication 4 MILLIGRAM(S): at 21:32

## 2017-06-23 RX ADMIN — CEFTRIAXONE 100 GRAM(S): 500 INJECTION, POWDER, FOR SOLUTION INTRAMUSCULAR; INTRAVENOUS at 14:00

## 2017-06-23 RX ADMIN — SENNA PLUS 2 TABLET(S): 8.6 TABLET ORAL at 21:31

## 2017-06-23 RX ADMIN — Medication 1 TABLET(S): at 13:02

## 2017-06-23 RX ADMIN — HEPARIN SODIUM 5000 UNIT(S): 5000 INJECTION INTRAVENOUS; SUBCUTANEOUS at 05:12

## 2017-06-23 RX ADMIN — Medication 100 MILLIGRAM(S): at 13:02

## 2017-06-23 RX ADMIN — HEPARIN SODIUM 5000 UNIT(S): 5000 INJECTION INTRAVENOUS; SUBCUTANEOUS at 13:03

## 2017-06-23 NOTE — DISCHARGE NOTE ADULT - MEDICATION SUMMARY - MEDICATIONS TO TAKE
I will START or STAY ON the medications listed below when I get home from the hospital:    Aspirin Enteric Coated 81 mg oral delayed release tablet  -- 1 tab(s) by mouth once a day  -- Indication: For Cerebrovascular accident (CVA), unspecified mechanism    tamsulosin 0.4 mg oral capsule  -- 1 cap(s) by mouth once a day (after a meal)  -- Indication: For BPH (benign prostatic hypertrophy) with urinary retention    Rapaflo 8 mg oral capsule  -- 1 cap(s) by mouth once a day (in the morning after a meal)  -- Indication: For BPH (benign prostatic hypertrophy) with urinary retention    citalopram 10 mg oral tablet  -- 1 tab(s) by mouth once a day  -- Indication: For Depression    Plavix 75 mg oral tablet  -- 1 tab(s) by mouth once on sunday & wednesday.  -- Indication: For Cerebrovascular accident (CVA), unspecified mechanism    Advair Diskus 100 mcg-50 mcg inhalation powder  -- 1 puff(s) inhaled 2 times a day  -- Indication: For Chronic obstructive lung disease    ProAir HFA 90 mcg/inh inhalation aerosol  -- 1 puff(s) inhaled , As Needed  -- Indication: For Chronic obstructive lung disease    triamcinolone 0.1% topical cream  -- Apply on skin to affected area , As Needed  -- Indication: For Prophylactic measure    senna oral tablet  -- 2 tab(s) by mouth once a day (at bedtime)  -- Indication: For Prophylactic measure    Synthroid 75 mcg (0.075 mg) oral tablet  -- 1 tab(s) by mouth once a day  -- Indication: For Hypothyroid    Centrum Silver oral tablet  -- 1 tab(s) by mouth once a day  -- Indication: For Prophylactic measure    thiamine 100 mg oral tablet  -- 1 tab(s) by mouth once a day  -- Indication: For ETOH abuse

## 2017-06-23 NOTE — PROGRESS NOTE ADULT - SUBJECTIVE AND OBJECTIVE BOX
Patient is a 82y old  Male who presents with a chief complaint of Fever (2017 14:44)      SUBJECTIVE / OVERNIGHT EVENTS:  No overnight events. Pt feeling well this morning, but still a little week. Denies chest pain, sob, n/v/d, abd pain or dizziness.       MEDICATIONS  (STANDING):  heparin  Injectable 5000Unit(s) SubCutaneous every 8 hours  buDESOnide  80 MICROgram(s)/formoterol 4.5 MICROgram(s) Inhaler 2Puff(s) Inhalation two times a day  aspirin enteric coated 81milliGRAM(s) Oral daily  clopidogrel Tablet 75milliGRAM(s) Oral <User Schedule>  levothyroxine 75MICROGram(s) Oral daily  citalopram 10milliGRAM(s) Oral daily  doxazosin 4milliGRAM(s) Oral at bedtime  tamsulosin 0.4milliGRAM(s) Oral at bedtime  cefTRIAXone   IVPB 1Gram(s) IV Intermittent every 24 hours  multivitamin 1Tablet(s) Oral daily  thiamine 100milliGRAM(s) Oral daily    MEDICATIONS  (PRN):  ALBUTerol/ipratropium for Nebulization 3milliLiter(s) Nebulizer every 6 hours PRN Shortness of Breath and/or Wheezing  LORazepam   Injectable 1milliGRAM(s) IV Push every 2 hours PRN CIWA-Ar score increase by 2 points and a total score of 7 or less  aluminum hydroxide/magnesium hydroxide/simethicone Suspension 30milliLiter(s) Oral every 6 hours PRN Dyspepsia      Vital Signs Last 24 Hrs  T(C): 37.1, Max: 37.8 (06-22 @ 10:00)  HR: 77 (77 - 104)  BP: 129/76 (100/82 - 169/96)  RR: 18 (16 - 18)  SpO2: 95% (94% - 97%)  Wt(kg): --  CAPILLARY BLOOD GLUCOSE    I&O's Summary    I & Os for current day (as of 2017 08:59)  =============================================  IN: 0 ml / OUT: 750 ml / NET: -750 ml      PHYSICAL EXAM:  GENERAL: NAD, well-developed  HEAD:  Atraumatic, Normocephalic  EYES: EOMI, PERRLA, conjunctiva and sclera clear  NECK: Supple, No JVD  CHEST/LUNG: Clear to auscultation bilaterally; No wheeze  HEART: Regular rate and rhythm; No murmurs, rubs, or gallops  ABDOMEN: Soft, Nontender, Nondistended; Bowel sounds present  EXTREMITIES:  2+ Peripheral Pulses, No clubbing, cyanosis, or edema  PSYCH: AAOx3  NEUROLOGY: non-focal  SKIN: b/l bruises on UE    LABS:                        11.2   3.8   )-----------( 161      ( 2017 06:55 )             33.5     06-    135  |  104  |  13  ----------------------------<  100<H>  3.6   |  22  |  0.60    Ca    8.1<L>      2017 06:55  Phos  2.4     -  Mg     1.7         TPro  6.3  /  Alb  2.9<L>  /  TBili  0.4  /  DBili  0.2  /  AST  55<H>  /  ALT  25  /  AlkPhos  80  06-22          Urinalysis Basic - ( 2017 10:43 )    Color: Yellow / Appearance: Clear / S.013 / pH: x  Gluc: x / Ketone: Negative  / Bili: Negative / Urobili: Negative   Blood: x / Protein: Negative / Nitrite: Positive   Leuk Esterase: Large / RBC: x / WBC 26-50 /HPF   Sq Epi: x / Non Sq Epi: OCC /HPF / Bacteria: Few /HPF        RADIOLOGY & ADDITIONAL TESTS:    Imaging Personally Reviewed:    Consultant(s) Notes Reviewed:      Care Discussed with Consultants/Other Providers:

## 2017-06-23 NOTE — DISCHARGE NOTE ADULT - CARE PROVIDER_API CALL
Roe Samuel), Gastroenterology; Internal Medicine  [address]  [place] [address]  Phone: [phone]  Fax: [phone]

## 2017-06-23 NOTE — DISCHARGE NOTE ADULT - SECONDARY DIAGNOSIS.
Hypothyroidism, unspecified type Cerebrovascular accident (CVA), unspecified mechanism Prostate cancer BPH (benign prostatic hypertrophy) with urinary retention Depression, unspecified depression type ETOH abuse

## 2017-06-23 NOTE — DISCHARGE NOTE ADULT - PATIENT PORTAL LINK FT
“You can access the Follow[employer] Patient Portal, offered by [facility], by registering with the following website: [email or web address]”

## 2017-06-23 NOTE — DISCHARGE NOTE ADULT - HOSPITAL COURSE
82M w/ PMH of Prostate Ca (s/p RT), BPH, Emphysema/Asthma, Hypothyroid, Chronic LBP, prior UTI with admission in 5/2017 for sepsis 2/2 UTI, p/w fever x 1-2 days.  Tm 100.7 @ home. Pt with c/o of nausea, generally feeling unwell.  +Dysuria for unknown time. Given Bactrim by Urologist, took first dose last night. Denies CP/SOB, denies abd pain, denies back pain, denies cough.  He was seen by his Urologist on 6/16 where PVR noted to be 370; UCx drawn given hx and Flomax restarted at that time.  UCx showed >100K Coag Neg Staph, no sensitivities available. Came to ED for fever.  No recent instrumentation or Flynn. Pt lives at home with wife, ambulatory without assistance (shuffling gait), and still drives/independent of adls.  No falls.     ED Vitals: Tm:100.1 (rectal), 102, 130/82, 18, 95% RA.      Labs: no leukocytosis (wbc 8.7 though relative inc compared to baseline of ~3 chronically), stable chronic anemia (hgb 12.1), unremarkable cmp, +UA.  Prior Ucx show <10K normal uro eugenia and BCx negative from May 2017. EKG with sinus tach 100 bpm, LAD.  Received 1L NS, Ceftriaxone, Zofran and Tylenol in ED. 82M w/ PMH of Prostate Ca (s/p RT), BPH, Emphysema/Asthma, Hypothyroid, Chronic LBP, prior UTI with admission in 5/2017 for sepsis 2/2 UTI, p/w fever x 1-2 days.  Tm 100.7 @ home. Pt with c/o of nausea, generally feeling unwell.  +Dysuria for unknown time. Given Bactrim by Urologist, took first dose last night. Denies CP/SOB, denies abd pain, denies back pain, denies cough.  He was seen by his Urologist on 6/16 where PVR noted to be 370; UCx drawn given hx and Flomax restarted at that time.  UCx showed >100K Coag Neg Staph, no sensitivities available. Came to ED for fever.  No recent instrumentation or Flynn. Pt lives at home with wife, ambulatory without assistance (shuffling gait), and still drives/independent of adls.  No falls.     ED Vitals: Tm:100.1 (rectal), 102, 130/82, 18, 95% RA.      Labs: no leukocytosis (wbc 8.7 though relative inc compared to baseline of ~3 chronically), stable chronic anemia (hgb 12.1), unremarkable cmp, +UA.  Prior Ucx show <10K normal uro eugenia and BCx negative from May 2017. EKG with sinus tach 100 bpm, LAD.  Received 1L NS, Ceftriaxone, Zofran and Tylenol in ED and was admitted to medicine.    On Medicine floor patient was 82M w/ PMH of Prostate Ca (s/p RT), BPH, Emphysema/Asthma, Hypothyroid, Chronic LBP, prior UTI with admission in 5/2017 for sepsis 2/2 UTI, p/w fever x 1-2 days.  Tm 100.7 @ home. Pt with c/o of nausea, generally feeling unwell.  +Dysuria for unknown time. Given Bactrim by Urologist, took first dose last night. Denies CP/SOB, denies abd pain, denies back pain, denies cough.  He was seen by his Urologist on 6/16 where PVR noted to be 370; UCx drawn given hx and Flomax restarted at that time.  UCx showed >100K Coag Neg Staph, no sensitivities available. Came to ED for fever.  No recent instrumentation or Flynn. Pt lives at home with wife, ambulatory without assistance (shuffling gait), and still drives/independent of adls.  No falls.     ED Vitals: Tm:100.1 (rectal), 102, 130/82, 18, 95% RA.      Labs: no leukocytosis (wbc 8.7 though relative inc compared to baseline of ~3 chronically), stable chronic anemia (hgb 12.1), unremarkable cmp, +UA.  Prior Ucx show <10K normal uro eugenia and BCx negative from May 2017. EKG with sinus tach 100 bpm, LAD.  Received 1L NS, Ceftriaxone, Zofran and Tylenol in ED and was admitted to medicine.    On medicine floor patient Urine culture came back positive for coag negative staph. Patient was treated with ceftriaxone through 6/26. 82M w/ PMH of Prostate Ca (s/p RT), BPH, Emphysema/Asthma, Hypothyroid, Chronic LBP, prior UTI with admission in 5/2017 for sepsis 2/2 UTI, p/w fever x 1-2 days.  Tm 100.7 @ home. Pt with c/o of nausea, generally feeling unwell.  +Dysuria for unknown time. Given Bactrim by Urologist, took first dose last night. Denies CP/SOB, denies abd pain, denies back pain, denies cough.  He was seen by his Urologist on 6/16 where PVR noted to be 370; UCx drawn given hx and Flomax restarted at that time.  UCx showed >100K Coag Neg Staph, no sensitivities available. Came to ED for fever.  No recent instrumentation or Flynn. Pt lives at home with wife, ambulatory without assistance (shuffling gait), and still drives/independent of adls.  No falls.     ED Vitals: Tm:100.1 (rectal), 102, 130/82, 18, 95% RA.      Labs: no leukocytosis (wbc 8.7 though relative inc compared to baseline of ~3 chronically), stable chronic anemia (hgb 12.1), unremarkable cmp, +UA.  Prior Ucx show <10K normal uro eugenia and BCx negative from May 2017. EKG with sinus tach 100 bpm, LAD.  Received 1L NS, Ceftriaxone, Zofran and Tylenol in ED and was admitted to medicine.    On medicine floor patient's UA came back positive for coag negative staph on 6/22. Blood cultures  taken on 6/22 and negative. Patient was treated with ceftriaxone through 6/26. Hemodynamically stable throughtout admission with   HR between 60-80 and BP systolic between 120-160. Patient's WBC stable throughout admission. Patient was afebrile throughout admission. Pt  continued synthroid, tamsulosin, plavix, and ASA from home regimen. After finishing course of ceftriaxone patient was cleared for  discharge on on home PO antibiotics and instructed to follow up with Dr. Roe Samuel in 1-2 weeks.

## 2017-06-23 NOTE — PROGRESS NOTE ADULT - ATTENDING COMMENTS
Admitted with apparent UTI.  Urine growing coag negative staph.  Blood cx pending.  C/w ceftriaxone.  Was question of EtOH abuse but pt is showing no signs of EtOH withdrawal so ANA lunsford'ed.

## 2017-06-23 NOTE — DISCHARGE NOTE ADULT - PLAN OF CARE
Please follow up with your PCP within 1-2 weeks. Follow up with your primary care physician. Take your synthroid medication as instructed. Take your aspirin and plavix as instructed Please follow up with your urologist. Take your rapaflo and tamsulosin as directed. Take your celebrex as directed. Take your thiamine as directed.

## 2017-06-23 NOTE — DISCHARGE NOTE ADULT - CARE PLAN
Principal Discharge DX:	Urinary tract infection, site unspecified  Goal:	Follow up with your primary care physician.  Instructions for follow-up, activity and diet:	Please follow up with your PCP within 1-2 weeks.  Secondary Diagnosis:	Hypothyroidism, unspecified type  Instructions for follow-up, activity and diet:	Take your synthroid medication as instructed.  Secondary Diagnosis:	Cerebrovascular accident (CVA), unspecified mechanism  Instructions for follow-up, activity and diet:	Take your aspirin and plavix as instructed  Secondary Diagnosis:	Prostate cancer  Instructions for follow-up, activity and diet:	Please follow up with your urologist.  Secondary Diagnosis:	BPH (benign prostatic hypertrophy) with urinary retention  Instructions for follow-up, activity and diet:	Take your rapaflo and tamsulosin as directed.  Secondary Diagnosis:	Depression, unspecified depression type  Instructions for follow-up, activity and diet:	Take your celebrex as directed.  Secondary Diagnosis:	ETOH abuse  Instructions for follow-up, activity and diet:	Take your thiamine as directed.

## 2017-06-24 LAB
ANION GAP SERPL CALC-SCNC: 9 MMOL/L — SIGNIFICANT CHANGE UP (ref 5–17)
BUN SERPL-MCNC: 15 MG/DL — SIGNIFICANT CHANGE UP (ref 7–23)
CALCIUM SERPL-MCNC: 8.2 MG/DL — LOW (ref 8.4–10.5)
CHLORIDE SERPL-SCNC: 104 MMOL/L — SIGNIFICANT CHANGE UP (ref 96–108)
CO2 SERPL-SCNC: 23 MMOL/L — SIGNIFICANT CHANGE UP (ref 22–31)
CREAT SERPL-MCNC: 0.56 MG/DL — SIGNIFICANT CHANGE UP (ref 0.5–1.3)
GLUCOSE SERPL-MCNC: 83 MG/DL — SIGNIFICANT CHANGE UP (ref 70–99)
HCT VFR BLD CALC: 37.1 % — LOW (ref 39–50)
HGB BLD-MCNC: 11.7 G/DL — LOW (ref 13–17)
MAGNESIUM SERPL-MCNC: 1.8 MG/DL — SIGNIFICANT CHANGE UP (ref 1.6–2.6)
MCHC RBC-ENTMCNC: 29.1 PG — SIGNIFICANT CHANGE UP (ref 27–34)
MCHC RBC-ENTMCNC: 31.6 GM/DL — LOW (ref 32–36)
MCV RBC AUTO: 92 FL — SIGNIFICANT CHANGE UP (ref 80–100)
PHOSPHATE SERPL-MCNC: 1.8 MG/DL — LOW (ref 2.5–4.5)
PLATELET # BLD AUTO: 167 K/UL — SIGNIFICANT CHANGE UP (ref 150–400)
POTASSIUM SERPL-MCNC: 3.5 MMOL/L — SIGNIFICANT CHANGE UP (ref 3.5–5.3)
POTASSIUM SERPL-SCNC: 3.5 MMOL/L — SIGNIFICANT CHANGE UP (ref 3.5–5.3)
RBC # BLD: 4.03 M/UL — LOW (ref 4.2–5.8)
RBC # FLD: 13.1 % — SIGNIFICANT CHANGE UP (ref 10.3–14.5)
SODIUM SERPL-SCNC: 136 MMOL/L — SIGNIFICANT CHANGE UP (ref 135–145)
WBC # BLD: 5.1 K/UL — SIGNIFICANT CHANGE UP (ref 3.8–10.5)
WBC # FLD AUTO: 5.1 K/UL — SIGNIFICANT CHANGE UP (ref 3.8–10.5)

## 2017-06-24 PROCEDURE — 99232 SBSQ HOSP IP/OBS MODERATE 35: CPT | Mod: GC

## 2017-06-24 RX ORDER — POLYETHYLENE GLYCOL 3350 17 G/17G
17 POWDER, FOR SOLUTION ORAL ONCE
Qty: 0 | Refills: 0 | Status: COMPLETED | OUTPATIENT
Start: 2017-06-24 | End: 2017-06-24

## 2017-06-24 RX ADMIN — Medication 1 TABLET(S): at 12:45

## 2017-06-24 RX ADMIN — Medication 100 MILLIGRAM(S): at 17:31

## 2017-06-24 RX ADMIN — Medication 100 MILLIGRAM(S): at 12:44

## 2017-06-24 RX ADMIN — POLYETHYLENE GLYCOL 3350 17 GRAM(S): 17 POWDER, FOR SOLUTION ORAL at 10:53

## 2017-06-24 RX ADMIN — TAMSULOSIN HYDROCHLORIDE 0.4 MILLIGRAM(S): 0.4 CAPSULE ORAL at 21:02

## 2017-06-24 RX ADMIN — BUDESONIDE AND FORMOTEROL FUMARATE DIHYDRATE 2 PUFF(S): 160; 4.5 AEROSOL RESPIRATORY (INHALATION) at 06:01

## 2017-06-24 RX ADMIN — Medication 4 MILLIGRAM(S): at 21:01

## 2017-06-24 RX ADMIN — Medication 100 MILLIGRAM(S): at 05:58

## 2017-06-24 RX ADMIN — Medication 75 MICROGRAM(S): at 05:58

## 2017-06-24 RX ADMIN — Medication 81 MILLIGRAM(S): at 12:45

## 2017-06-24 RX ADMIN — CITALOPRAM 10 MILLIGRAM(S): 10 TABLET, FILM COATED ORAL at 12:45

## 2017-06-24 RX ADMIN — HEPARIN SODIUM 5000 UNIT(S): 5000 INJECTION INTRAVENOUS; SUBCUTANEOUS at 21:01

## 2017-06-24 RX ADMIN — HEPARIN SODIUM 5000 UNIT(S): 5000 INJECTION INTRAVENOUS; SUBCUTANEOUS at 05:58

## 2017-06-24 RX ADMIN — BUDESONIDE AND FORMOTEROL FUMARATE DIHYDRATE 2 PUFF(S): 160; 4.5 AEROSOL RESPIRATORY (INHALATION) at 17:55

## 2017-06-24 RX ADMIN — SENNA PLUS 2 TABLET(S): 8.6 TABLET ORAL at 21:01

## 2017-06-24 RX ADMIN — CLOPIDOGREL BISULFATE 75 MILLIGRAM(S): 75 TABLET, FILM COATED ORAL at 10:54

## 2017-06-24 RX ADMIN — CEFTRIAXONE 100 GRAM(S): 500 INJECTION, POWDER, FOR SOLUTION INTRAMUSCULAR; INTRAVENOUS at 16:18

## 2017-06-24 RX ADMIN — HEPARIN SODIUM 5000 UNIT(S): 5000 INJECTION INTRAVENOUS; SUBCUTANEOUS at 16:19

## 2017-06-24 NOTE — PROGRESS NOTE ADULT - SUBJECTIVE AND OBJECTIVE BOX
Patient is a 82y old  Male who presents with a chief complaint of Fever (2017 16:17)      SUBJECTIVE / OVERNIGHT EVENTS:    MEDICATIONS  (STANDING):  heparin  Injectable 5000Unit(s) SubCutaneous every 8 hours  buDESOnide  80 MICROgram(s)/formoterol 4.5 MICROgram(s) Inhaler 2Puff(s) Inhalation two times a day  aspirin enteric coated 81milliGRAM(s) Oral daily  clopidogrel Tablet 75milliGRAM(s) Oral <User Schedule>  levothyroxine 75MICROGram(s) Oral daily  citalopram 10milliGRAM(s) Oral daily  doxazosin 4milliGRAM(s) Oral at bedtime  tamsulosin 0.4milliGRAM(s) Oral at bedtime  cefTRIAXone   IVPB 1Gram(s) IV Intermittent every 24 hours  multivitamin 1Tablet(s) Oral daily  thiamine 100milliGRAM(s) Oral daily  senna 2Tablet(s) Oral at bedtime  docusate sodium 100milliGRAM(s) Oral two times a day    MEDICATIONS  (PRN):  ALBUTerol/ipratropium for Nebulization 3milliLiter(s) Nebulizer every 6 hours PRN Shortness of Breath and/or Wheezing  aluminum hydroxide/magnesium hydroxide/simethicone Suspension 30milliLiter(s) Oral every 6 hours PRN Dyspepsia      Vital Signs Last 24 Hrs  T(C): 36.7, Max: 36.8 (06-23 @ 22:09)  HR: 73 (72 - 76)  BP: 136/73 (136/73 - 160/88)  RR: 18 (18 - 18)  SpO2: 97% (96% - 97%)  Wt(kg): --  CAPILLARY BLOOD GLUCOSE    I&O's Summary    I & Os for current day (as of 2017 10:16)  =============================================  IN: 720 ml / OUT: 1300 ml / NET: -580 ml      PHYSICAL EXAM:  GENERAL: NAD, well-developed  HEAD:  Atraumatic, Normocephalic  EYES: EOMI, PERRLA, conjunctiva and sclera clear  NECK: Supple, No JVD  CHEST/LUNG: Clear to auscultation bilaterally; No wheeze  HEART: Regular rate and rhythm; No murmurs, rubs, or gallops  ABDOMEN: Soft, Nontender, Nondistended; Bowel sounds present  EXTREMITIES:  2+ Peripheral Pulses, No clubbing, cyanosis, or edema  PSYCH: AAOx3  NEUROLOGY: non-focal  SKIN: No rashes or lesions    LABS:                        11.7   5.1   )-----------( 167      ( 2017 07:19 )             37.1         136  |  104  |  15  ----------------------------<  83  3.5   |  23  |  0.56    Ca    8.2<L>      2017 07:19  Phos  1.8       Mg     1.8         TPro  6.3  /  Alb  2.9<L>  /  TBili  0.4  /  DBili  0.2  /  AST  55<H>  /  ALT  25  /  AlkPhos  80            Urinalysis Basic - ( 2017 10:43 )    Color: Yellow / Appearance: Clear / S.013 / pH: x  Gluc: x / Ketone: Negative  / Bili: Negative / Urobili: Negative   Blood: x / Protein: Negative / Nitrite: Positive   Leuk Esterase: Large / RBC: x / WBC 26-50 /HPF   Sq Epi: x / Non Sq Epi: OCC /HPF / Bacteria: Few /HPF        RADIOLOGY & ADDITIONAL TESTS:    Imaging Personally Reviewed:    Consultant(s) Notes Reviewed:      Care Discussed with Consultants/Other Providers: Patient is a 82y old  Male who presents with a chief complaint of Fever (2017 16:17)      SUBJECTIVE / OVERNIGHT EVENTS:  No acute events overnight.  Patient feeling well this morning, no chest pain, dyspnea, n/v, dysuria, dizziness.  +constipation.       MEDICATIONS  (STANDING):  heparin  Injectable 5000Unit(s) SubCutaneous every 8 hours  buDESOnide  80 MICROgram(s)/formoterol 4.5 MICROgram(s) Inhaler 2Puff(s) Inhalation two times a day  aspirin enteric coated 81milliGRAM(s) Oral daily  clopidogrel Tablet 75milliGRAM(s) Oral <User Schedule>  levothyroxine 75MICROGram(s) Oral daily  citalopram 10milliGRAM(s) Oral daily  doxazosin 4milliGRAM(s) Oral at bedtime  tamsulosin 0.4milliGRAM(s) Oral at bedtime  cefTRIAXone   IVPB 1Gram(s) IV Intermittent every 24 hours  multivitamin 1Tablet(s) Oral daily  thiamine 100milliGRAM(s) Oral daily  senna 2Tablet(s) Oral at bedtime  docusate sodium 100milliGRAM(s) Oral two times a day    MEDICATIONS  (PRN):  ALBUTerol/ipratropium for Nebulization 3milliLiter(s) Nebulizer every 6 hours PRN Shortness of Breath and/or Wheezing  aluminum hydroxide/magnesium hydroxide/simethicone Suspension 30milliLiter(s) Oral every 6 hours PRN Dyspepsia      Vital Signs Last 24 Hrs  T(C): 36.7, Max: 36.8 (06-23 @ 22:09)  HR: 73 (72 - 76)  BP: 136/73 (136/73 - 160/88)  RR: 18 (18 - 18)  SpO2: 97% (96% - 97%)      I&O's Summary    I & Os for current day (as of 2017 10:16)  =============================================  IN: 720 ml / OUT: 1300 ml / NET: -580 ml      PHYSICAL EXAM:  GENERAL: NAD, well-developed  HEAD:  Atraumatic, Normocephalic  EYES: EOMI, PERRLA, conjunctiva and sclera clear  NECK: Supple, No JVD  CHEST/LUNG: Clear to auscultation bilaterally; No wheeze  HEART: Regular rate and rhythm; No murmurs, rubs, or gallops  ABDOMEN: Soft, Nontender, Nondistended; Bowel sounds present  EXTREMITIES:  2+ Peripheral Pulses, No clubbing, cyanosis, or edema  PSYCH: AAOx3  NEUROLOGY: non-focal  SKIN: b/l bruises on UE      LABS:                        11.7   5.1   )-----------( 167      ( 2017 07:19 )             37.1     -    136  |  104  |  15  ----------------------------<  83  3.5   |  23  |  0.56    Ca    8.2<L>      2017 07:19  Phos  1.8       Mg     1.8         TPro  6.3  /  Alb  2.9<L>  /  TBili  0.4  /  DBili  0.2  /  AST  55<H>  /  ALT  25  /  AlkPhos  80            Urinalysis Basic - ( 2017 10:43 )    Color: Yellow / Appearance: Clear / S.013 / pH: x  Gluc: x / Ketone: Negative  / Bili: Negative / Urobili: Negative   Blood: x / Protein: Negative / Nitrite: Positive   Leuk Esterase: Large / RBC: x / WBC 26-50 /HPF   Sq Epi: x / Non Sq Epi: OCC /HPF / Bacteria: Few /HPF

## 2017-06-24 NOTE — PHYSICAL THERAPY INITIAL EVALUATION ADULT - GENERAL OBSERVATIONS, REHAB EVAL
Received seated on chair, alert & verbally responsive, +IV lock, without complaints, agreeable to PT

## 2017-06-24 NOTE — PHYSICAL THERAPY INITIAL EVALUATION ADULT - PERTINENT HX OF CURRENT PROBLEM, REHAB EVAL
82 M PMH prostate ca s/p RT remotely, BPH, emphysema/asthma, hypothyroid, chronic LBP, prior UTI with admission in May 2017 for urosepsis, presents with fever x 1-2 days, f/w sepsis 2/2 UTI.

## 2017-06-24 NOTE — PROGRESS NOTE ADULT - ATTENDING COMMENTS
Pt presented with dysurea and found to have positive UA c/w UTI; pt is now on day 2 of IV ceftriaxone; urine cx was positive for coag neg staph, and blood cultures were negative x 2; pt is afebrile, with no leukocytosis, and currently asymptomatic    PT eval pending; possible role of home PT or rehab    ANNA Lovell, Attending Physician Pt presented with dysurea and found to have positive UA c/w UTI; pt is now on day 2 of IV ceftriaxone; urine cx was positive for coag neg staph, and blood cultures were negative x 2; pt is afebrile, with no leukocytosis, and currently asymptomatic    PT eval pending; possible role of home PT or rehab    Pt ha snot had a bm in 4 days; ordered ofr senna, colace, and miralax x1 today    ANNA Lovell, Attending Physician Pt presented with dysurea and found to have positive UA c/w UTI; pt is now on day 2 of IV ceftriaxone; urine cx was positive for coag neg staph, and blood cultures were negative x 2; pt is afebrile, with no leukocytosis, and currently asymptomatic; given pt is nontoxic, not septic, stable and now asymptomatic, would favor a shorter course of abx - possibly 3-5 days?, will monitor symptoms through weekend; pt has h/o BPH and prostate cancer that may have been nidus for infection, cont flomax and cardura    PT eval pending; possible role of home PT or rehab    Pt has not had a bm in 4 days; ordered for senna, colace, and miralax x1 today    ANNA Lovell, Attending Physician

## 2017-06-25 LAB
ANION GAP SERPL CALC-SCNC: 11 MMOL/L — SIGNIFICANT CHANGE UP (ref 5–17)
BUN SERPL-MCNC: 15 MG/DL — SIGNIFICANT CHANGE UP (ref 7–23)
CALCIUM SERPL-MCNC: 8.5 MG/DL — SIGNIFICANT CHANGE UP (ref 8.4–10.5)
CHLORIDE SERPL-SCNC: 105 MMOL/L — SIGNIFICANT CHANGE UP (ref 96–108)
CO2 SERPL-SCNC: 22 MMOL/L — SIGNIFICANT CHANGE UP (ref 22–31)
CREAT SERPL-MCNC: 0.59 MG/DL — SIGNIFICANT CHANGE UP (ref 0.5–1.3)
GLUCOSE SERPL-MCNC: 84 MG/DL — SIGNIFICANT CHANGE UP (ref 70–99)
HCT VFR BLD CALC: 33.9 % — LOW (ref 39–50)
HGB BLD-MCNC: 11.4 G/DL — LOW (ref 13–17)
MAGNESIUM SERPL-MCNC: 1.9 MG/DL — SIGNIFICANT CHANGE UP (ref 1.6–2.6)
MCHC RBC-ENTMCNC: 30.8 PG — SIGNIFICANT CHANGE UP (ref 27–34)
MCHC RBC-ENTMCNC: 33.7 GM/DL — SIGNIFICANT CHANGE UP (ref 32–36)
MCV RBC AUTO: 91.3 FL — SIGNIFICANT CHANGE UP (ref 80–100)
PHOSPHATE SERPL-MCNC: 2.3 MG/DL — LOW (ref 2.5–4.5)
PLATELET # BLD AUTO: 177 K/UL — SIGNIFICANT CHANGE UP (ref 150–400)
POTASSIUM SERPL-MCNC: 3.6 MMOL/L — SIGNIFICANT CHANGE UP (ref 3.5–5.3)
POTASSIUM SERPL-SCNC: 3.6 MMOL/L — SIGNIFICANT CHANGE UP (ref 3.5–5.3)
RBC # BLD: 3.72 M/UL — LOW (ref 4.2–5.8)
RBC # FLD: 13 % — SIGNIFICANT CHANGE UP (ref 10.3–14.5)
SODIUM SERPL-SCNC: 138 MMOL/L — SIGNIFICANT CHANGE UP (ref 135–145)
WBC # BLD: 3.8 K/UL — SIGNIFICANT CHANGE UP (ref 3.8–10.5)
WBC # FLD AUTO: 3.8 K/UL — SIGNIFICANT CHANGE UP (ref 3.8–10.5)

## 2017-06-25 PROCEDURE — 99232 SBSQ HOSP IP/OBS MODERATE 35: CPT | Mod: GC

## 2017-06-25 RX ORDER — POLYETHYLENE GLYCOL 3350 17 G/17G
17 POWDER, FOR SOLUTION ORAL ONCE
Qty: 0 | Refills: 0 | Status: COMPLETED | OUTPATIENT
Start: 2017-06-25 | End: 2017-06-25

## 2017-06-25 RX ORDER — POLYETHYLENE GLYCOL 3350 17 G/17G
17 POWDER, FOR SOLUTION ORAL DAILY
Qty: 0 | Refills: 0 | Status: DISCONTINUED | OUTPATIENT
Start: 2017-06-25 | End: 2017-06-26

## 2017-06-25 RX ADMIN — Medication 5 MILLIGRAM(S): at 11:56

## 2017-06-25 RX ADMIN — CITALOPRAM 10 MILLIGRAM(S): 10 TABLET, FILM COATED ORAL at 11:01

## 2017-06-25 RX ADMIN — Medication 100 MILLIGRAM(S): at 11:02

## 2017-06-25 RX ADMIN — HEPARIN SODIUM 5000 UNIT(S): 5000 INJECTION INTRAVENOUS; SUBCUTANEOUS at 15:13

## 2017-06-25 RX ADMIN — Medication 1 TABLET(S): at 11:02

## 2017-06-25 RX ADMIN — Medication 4 MILLIGRAM(S): at 21:42

## 2017-06-25 RX ADMIN — HEPARIN SODIUM 5000 UNIT(S): 5000 INJECTION INTRAVENOUS; SUBCUTANEOUS at 21:43

## 2017-06-25 RX ADMIN — Medication 100 MILLIGRAM(S): at 06:49

## 2017-06-25 RX ADMIN — SENNA PLUS 2 TABLET(S): 8.6 TABLET ORAL at 21:43

## 2017-06-25 RX ADMIN — Medication 75 MICROGRAM(S): at 06:49

## 2017-06-25 RX ADMIN — POLYETHYLENE GLYCOL 3350 17 GRAM(S): 17 POWDER, FOR SOLUTION ORAL at 18:06

## 2017-06-25 RX ADMIN — BUDESONIDE AND FORMOTEROL FUMARATE DIHYDRATE 2 PUFF(S): 160; 4.5 AEROSOL RESPIRATORY (INHALATION) at 17:12

## 2017-06-25 RX ADMIN — TAMSULOSIN HYDROCHLORIDE 0.4 MILLIGRAM(S): 0.4 CAPSULE ORAL at 21:42

## 2017-06-25 RX ADMIN — BUDESONIDE AND FORMOTEROL FUMARATE DIHYDRATE 2 PUFF(S): 160; 4.5 AEROSOL RESPIRATORY (INHALATION) at 05:59

## 2017-06-25 RX ADMIN — CEFTRIAXONE 100 GRAM(S): 500 INJECTION, POWDER, FOR SOLUTION INTRAMUSCULAR; INTRAVENOUS at 15:13

## 2017-06-25 RX ADMIN — HEPARIN SODIUM 5000 UNIT(S): 5000 INJECTION INTRAVENOUS; SUBCUTANEOUS at 06:49

## 2017-06-25 RX ADMIN — Medication 63.75 MILLIMOLE(S): at 11:01

## 2017-06-25 RX ADMIN — Medication 100 MILLIGRAM(S): at 21:43

## 2017-06-25 RX ADMIN — POLYETHYLENE GLYCOL 3350 17 GRAM(S): 17 POWDER, FOR SOLUTION ORAL at 13:36

## 2017-06-25 RX ADMIN — Medication 5 MILLIGRAM(S): at 21:43

## 2017-06-25 RX ADMIN — Medication 81 MILLIGRAM(S): at 11:02

## 2017-06-25 NOTE — PROGRESS NOTE ADULT - SUBJECTIVE AND OBJECTIVE BOX
Overnight events:     Patient seen and examined at bedside. States no bowl movement for 2-3 days, usually patient is regular once a day. Has some discomfort  but sleeping well and has good appetite. No other complaints. No fever, chills, nausea, vomiting, diarrhea.    HPI:  82M w/ PMH of Prostate Ca (s/p RT), BPH, Emphysema/Asthma, Hypothyroid, Chronic LBP, prior UTI with admission in 5/2017 for sepsis 2/2 UTI, p/w fever x 1-2 days.  Tm 100.7 @ home. Pt with c/o of nausea, generally feeling unwell.  +Dysuria for unknown time. Given Bactrim by Urologist, took first dose last night. Denies CP/SOB, denies abd pain, denies back pain, denies cough.  He was seen by his Urologist on 6/16 where PVR noted to be 370; UCx drawn given hx and Flomax restarted at that time.  UCx showed >100K Coag Neg Staph, no sensitivities available. Came to ED for fever.  No recent instrumentation or Flynn. Pt lives at home with wife, ambulatory without assistance (shuffling gait), and still drives/independent of adls.  No falls.     ED Vitals: Tm:100.1 (rectal), 102, 130/82, 18, 95% RA.      Labs: no leukocytosis (wbc 8.7 though relative inc compared to baseline of ~3 chronically), stable chronic anemia (hgb 12.1), unremarkable cmp, +UA.  Prior Ucx show <10K normal uro eugenia and BCx negative from May 2017. EKG with sinus tach 100 bpm, LAD.  Received 1L NS, Ceftriaxone, Zofran and Tylenol in ED. (22 Jun 2017 14:44)    REVIEW OF SYSTEMS:    CONSTITUTIONAL: No weakness, fevers or chills  EYES/ENT: No visual changes, no throat pain   RESPIRATORY: No cough, wheezing, hemoptysis; No shortness of breath  CARDIOVASCULAR: No chest pain or palpitations  GASTROINTESTINAL: No abdominal, nausea, vomiting, or hematemesis; No diarrhea or constipation. No melena or hematochezia.  GENITOURINARY: No dysuria, frequency or hematuria  NEUROLOGICAL: No dizziness, numbness, or weakness  SKIN: No itching, burning, rashes, or lesions   All other review of systems is negative unless indicated above.    VITAL SIGNS:    Vital Signs Last 24 Hrs  T(C): 36.4, Max: 36.4 (06-25 @ 04:32)  T(F): 97.5, Max: 97.5 (06-25 @ 04:32)  HR: 82 (61 - 82)  BP: 141/74 (141/74 - 159/94)  BP(mean): --  RR: 18 (18 - 18)  SpO2: 94% (94% - 97%)      PHYSICAL EXAM:     GENERAL: no acute distress  HEENT: NC/AT, EOMI, neck supple, MMM  RESPIRATORY: LCTAB/L, no rhonchi, rales, or wheezing,   CARDIOVASCULAR: RRR, no murmurs, gallops, rubs, S1/S2  ABDOMINAL: soft, non-tender, non-distended, positive bowel sounds   EXTREMITIES: no clubbing, cyanosis, or edema  NEUROLOGICAL: alert and oriented x 3, non-focal  SKIN: no rashes or lesions   MUSCULOSKELETAL: no gross joint deformity                          11.4   3.8   )-----------( 177      ( 25 Jun 2017 07:11 )             33.9     06-25    138  |  105  |  15  ----------------------------<  84  3.6   |  22  |  0.59    Ca    8.5      25 Jun 2017 07:11  Phos  2.3     06-25  Mg     1.9     06-25        CAPILLARY BLOOD GLUCOSE      MEDICATIONS  (STANDING):  heparin  Injectable 5000Unit(s) SubCutaneous every 8 hours  buDESOnide  80 MICROgram(s)/formoterol 4.5 MICROgram(s) Inhaler 2Puff(s) Inhalation two times a day  aspirin enteric coated 81milliGRAM(s) Oral daily  clopidogrel Tablet 75milliGRAM(s) Oral <User Schedule>  levothyroxine 75MICROGram(s) Oral daily  citalopram 10milliGRAM(s) Oral daily  doxazosin 4milliGRAM(s) Oral at bedtime  tamsulosin 0.4milliGRAM(s) Oral at bedtime  cefTRIAXone   IVPB 1Gram(s) IV Intermittent every 24 hours  multivitamin 1Tablet(s) Oral daily  thiamine 100milliGRAM(s) Oral daily  senna 2Tablet(s) Oral at bedtime  docusate sodium 100milliGRAM(s) Oral two times a day  sodium phosphate IVPB 15milliMole(s) IV Intermittent once    No imaging

## 2017-06-25 NOTE — PROGRESS NOTE ADULT - ATTENDING COMMENTS
Pt presented with dysurea and found to have positive UA c/w UTI; pt is now on day 2 of IV ceftriaxone; urine cx was positive for coag neg staph, and blood cultures were negative x 2; pt is afebrile, with no leukocytosis, and currently asymptomatic; given pt is nontoxic, not septic, stable and now asymptomatic, would favor a shorter course of abx - possibly 3-5 days?, will monitor symptoms through weekend; pt has h/o BPH and prostate cancer that may have been nidus for infection, cont flomax and cardura    PT evaluated pt and determined pt is stabel to go home with no PT needs    Pt has not had a bm in 5 days; ordered for senna, colace, and miralax  yesterday, but still had no bm; ordered for dulcolax and miralax again today, if still no bm, may need lactulose, reassess in am    ANNA Lovell, Attending Physician

## 2017-06-26 VITALS — DIASTOLIC BLOOD PRESSURE: 77 MMHG | SYSTOLIC BLOOD PRESSURE: 145 MMHG

## 2017-06-26 LAB
ANION GAP SERPL CALC-SCNC: 9 MMOL/L — SIGNIFICANT CHANGE UP (ref 5–17)
BUN SERPL-MCNC: 10 MG/DL — SIGNIFICANT CHANGE UP (ref 7–23)
CALCIUM SERPL-MCNC: 8.3 MG/DL — LOW (ref 8.4–10.5)
CHLORIDE SERPL-SCNC: 105 MMOL/L — SIGNIFICANT CHANGE UP (ref 96–108)
CO2 SERPL-SCNC: 25 MMOL/L — SIGNIFICANT CHANGE UP (ref 22–31)
CREAT SERPL-MCNC: 0.59 MG/DL — SIGNIFICANT CHANGE UP (ref 0.5–1.3)
GLUCOSE SERPL-MCNC: 90 MG/DL — SIGNIFICANT CHANGE UP (ref 70–99)
HCT VFR BLD CALC: 34.1 % — LOW (ref 39–50)
HGB BLD-MCNC: 11.3 G/DL — LOW (ref 13–17)
MAGNESIUM SERPL-MCNC: 1.8 MG/DL — SIGNIFICANT CHANGE UP (ref 1.6–2.6)
MCHC RBC-ENTMCNC: 30.4 PG — SIGNIFICANT CHANGE UP (ref 27–34)
MCHC RBC-ENTMCNC: 33.2 GM/DL — SIGNIFICANT CHANGE UP (ref 32–36)
MCV RBC AUTO: 91.8 FL — SIGNIFICANT CHANGE UP (ref 80–100)
PHOSPHATE SERPL-MCNC: 2.9 MG/DL — SIGNIFICANT CHANGE UP (ref 2.5–4.5)
PLATELET # BLD AUTO: 184 K/UL — SIGNIFICANT CHANGE UP (ref 150–400)
POTASSIUM SERPL-MCNC: 3.5 MMOL/L — SIGNIFICANT CHANGE UP (ref 3.5–5.3)
POTASSIUM SERPL-SCNC: 3.5 MMOL/L — SIGNIFICANT CHANGE UP (ref 3.5–5.3)
RBC # BLD: 3.71 M/UL — LOW (ref 4.2–5.8)
RBC # FLD: 13 % — SIGNIFICANT CHANGE UP (ref 10.3–14.5)
SODIUM SERPL-SCNC: 139 MMOL/L — SIGNIFICANT CHANGE UP (ref 135–145)
WBC # BLD: 4.1 K/UL — SIGNIFICANT CHANGE UP (ref 3.8–10.5)
WBC # FLD AUTO: 4.1 K/UL — SIGNIFICANT CHANGE UP (ref 3.8–10.5)

## 2017-06-26 PROCEDURE — 93005 ELECTROCARDIOGRAM TRACING: CPT

## 2017-06-26 PROCEDURE — 84443 ASSAY THYROID STIM HORMONE: CPT

## 2017-06-26 PROCEDURE — 84132 ASSAY OF SERUM POTASSIUM: CPT

## 2017-06-26 PROCEDURE — 83605 ASSAY OF LACTIC ACID: CPT

## 2017-06-26 PROCEDURE — 83735 ASSAY OF MAGNESIUM: CPT

## 2017-06-26 PROCEDURE — 80076 HEPATIC FUNCTION PANEL: CPT

## 2017-06-26 PROCEDURE — 84100 ASSAY OF PHOSPHORUS: CPT

## 2017-06-26 PROCEDURE — 82947 ASSAY GLUCOSE BLOOD QUANT: CPT

## 2017-06-26 PROCEDURE — 87040 BLOOD CULTURE FOR BACTERIA: CPT

## 2017-06-26 PROCEDURE — 99285 EMERGENCY DEPT VISIT HI MDM: CPT | Mod: 25

## 2017-06-26 PROCEDURE — 87086 URINE CULTURE/COLONY COUNT: CPT

## 2017-06-26 PROCEDURE — 82803 BLOOD GASES ANY COMBINATION: CPT

## 2017-06-26 PROCEDURE — 80048 BASIC METABOLIC PNL TOTAL CA: CPT

## 2017-06-26 PROCEDURE — 80053 COMPREHEN METABOLIC PANEL: CPT

## 2017-06-26 PROCEDURE — 82330 ASSAY OF CALCIUM: CPT

## 2017-06-26 PROCEDURE — 82435 ASSAY OF BLOOD CHLORIDE: CPT

## 2017-06-26 PROCEDURE — 94640 AIRWAY INHALATION TREATMENT: CPT

## 2017-06-26 PROCEDURE — 85027 COMPLETE CBC AUTOMATED: CPT

## 2017-06-26 PROCEDURE — 85014 HEMATOCRIT: CPT

## 2017-06-26 PROCEDURE — 97161 PT EVAL LOW COMPLEX 20 MIN: CPT

## 2017-06-26 PROCEDURE — 99239 HOSP IP/OBS DSCHRG MGMT >30: CPT

## 2017-06-26 PROCEDURE — 81001 URINALYSIS AUTO W/SCOPE: CPT

## 2017-06-26 PROCEDURE — 84436 ASSAY OF TOTAL THYROXINE: CPT

## 2017-06-26 PROCEDURE — 84295 ASSAY OF SERUM SODIUM: CPT

## 2017-06-26 PROCEDURE — 84480 ASSAY TRIIODOTHYRONINE (T3): CPT

## 2017-06-26 RX ORDER — SENNA PLUS 8.6 MG/1
2 TABLET ORAL
Qty: 0 | Refills: 0 | COMMUNITY
Start: 2017-06-26

## 2017-06-26 RX ORDER — CITALOPRAM 10 MG/1
1 TABLET, FILM COATED ORAL
Qty: 0 | Refills: 0 | COMMUNITY
Start: 2017-06-26

## 2017-06-26 RX ORDER — THIAMINE MONONITRATE (VIT B1) 100 MG
1 TABLET ORAL
Qty: 0 | Refills: 0 | COMMUNITY
Start: 2017-06-26

## 2017-06-26 RX ADMIN — HEPARIN SODIUM 5000 UNIT(S): 5000 INJECTION INTRAVENOUS; SUBCUTANEOUS at 14:14

## 2017-06-26 RX ADMIN — Medication 75 MICROGRAM(S): at 05:42

## 2017-06-26 RX ADMIN — Medication 81 MILLIGRAM(S): at 12:07

## 2017-06-26 RX ADMIN — Medication 5 MILLIGRAM(S): at 05:47

## 2017-06-26 RX ADMIN — Medication 100 MILLIGRAM(S): at 12:04

## 2017-06-26 RX ADMIN — BUDESONIDE AND FORMOTEROL FUMARATE DIHYDRATE 2 PUFF(S): 160; 4.5 AEROSOL RESPIRATORY (INHALATION) at 05:43

## 2017-06-26 RX ADMIN — Medication 1 TABLET(S): at 12:05

## 2017-06-26 RX ADMIN — Medication 100 MILLIGRAM(S): at 05:46

## 2017-06-26 RX ADMIN — CEFTRIAXONE 100 GRAM(S): 500 INJECTION, POWDER, FOR SOLUTION INTRAMUSCULAR; INTRAVENOUS at 14:10

## 2017-06-26 RX ADMIN — HEPARIN SODIUM 5000 UNIT(S): 5000 INJECTION INTRAVENOUS; SUBCUTANEOUS at 05:42

## 2017-06-26 RX ADMIN — CITALOPRAM 10 MILLIGRAM(S): 10 TABLET, FILM COATED ORAL at 12:06

## 2017-06-26 NOTE — PROGRESS NOTE ADULT - PROBLEM SELECTOR PLAN 1
will continue treatment with ceftriaxone through 6/26.   - urine cx 6/22 grew > 100k coag neg GPC
will continue treatment with ceftriaxone through today 6/26.   - urine cx 6/22 grew > 100k coag neg GPC
will continue treatment with ceftriaxone. Pt no longer meeting sepsis criteria  -urine cx outpatient grew coag neg GPC, will follow up sensitivies.   f/u blood cultures.
will continue treatment with ceftriaxone. Pt no longer meeting sepsis criteria  -urine cx outpatient grew >100k coag neg GPC, will follow up sensitivities.   - c/w CTX through weekend (6/25)

## 2017-06-26 NOTE — PROGRESS NOTE ADULT - PROBLEM SELECTOR PLAN 8
-HSQ for DVT PPx  - PT consult states no home needs.
-HSQ for DVT PPx  -PT consult ordered

## 2017-06-26 NOTE — PROGRESS NOTE ADULT - PROBLEM SELECTOR PROBLEM 1
Urinary tract infection, site unspecified

## 2017-06-26 NOTE — PROGRESS NOTE ADULT - PROBLEM SELECTOR PLAN 7
-Will place on sx triggered CIWA, no evidence of withdrawal currently but reported multiple glasses of wine daily  -SW consult

## 2017-06-26 NOTE — PROGRESS NOTE ADULT - PROBLEM SELECTOR PROBLEM 3
Chronic obstructive lung disease

## 2017-06-26 NOTE — PROGRESS NOTE ADULT - PROBLEM SELECTOR PROBLEM 6
Cerebrovascular accident (CVA), unspecified mechanism

## 2017-06-26 NOTE — PROGRESS NOTE ADULT - PROBLEM SELECTOR PLAN 6
-Remote CVA, appears pt is on Plavix 75 twice weekly (wed/sat) and ASA daily, will continue for now as no overt bleeding

## 2017-06-26 NOTE — PROGRESS NOTE ADULT - SUBJECTIVE AND OBJECTIVE BOX
Overnight Events:    Patient seen and examined at bedside. No complaints overnight. Patient had BM after responding to Mirolax yesterday. No pain. No issues with sleep. Patient states no chills, fever, nausea, vomiting, diarrhea, dysuria.     HPI:  82M w/ PMH of Prostate Ca (s/p RT), BPH, Emphysema/Asthma, Hypothyroid, Chronic LBP, prior UTI with admission in 5/2017 for sepsis 2/2 UTI, p/w fever x 1-2 days.  Tm 100.7 @ home. Pt with c/o of nausea, generally feeling unwell.  +Dysuria for unknown time. Given Bactrim by Urologist, took first dose last night. Denies CP/SOB, denies abd pain, denies back pain, denies cough.  He was seen by his Urologist on 6/16 where PVR noted to be 370; UCx drawn given hx and Flomax restarted at that time.  UCx showed >100K Coag Neg Staph, no sensitivities available. Came to ED for fever.  No recent instrumentation or Flynn. Pt lives at home with wife, ambulatory without assistance (shuffling gait), and still drives/independent of adls.  No falls.     ED Vitals: Tm:100.1 (rectal), 102, 130/82, 18, 95% RA.      Labs: no leukocytosis (wbc 8.7 though relative inc compared to baseline of ~3 chronically), stable chronic anemia (hgb 12.1), unremarkable cmp, +UA.  Prior Ucx show <10K normal uro eugenia and BCx negative from May 2017. EKG with sinus tach 100 bpm, LAD.  Received 1L NS, Ceftriaxone, Zofran and Tylenol in ED. (22 Jun 2017 14:44)      REVIEW OF SYSTEMS:    CONSTITUTIONAL: No weakness, fevers or chills  EYES/ENT: No visual changes, no throat pain   RESPIRATORY: No cough, wheezing, hemoptysis; No shortness of breath  CARDIOVASCULAR: No chest pain or palpitations  GASTROINTESTINAL: No abdominal, nausea, vomiting, or hematemesis; No diarrhea or constipation. No melena or hematochezia.  GENITOURINARY: No dysuria, frequency or hematuria  NEUROLOGICAL: No dizziness, numbness, or weakness  SKIN: No itching, burning, rashes, or lesions   All other review of systems is negative unless indicated above.    VITAL SIGNS:    Vital Signs Last 24 Hrs  T(C): 36.5, Max: 36.5 (06-26 @ 05:03)  T(F): 97.7, Max: 97.7 (06-26 @ 05:03)  HR: 66 (60 - 69)  BP: 138/68 (138/68 - 175/82)  BP(mean): --  RR: 18 (18 - 18)  SpO2: 96% (94% - 97%)    I&O's Summary    I & Os for current day (as of 26 Jun 2017 10:57)  =============================================  IN: 0 ml / OUT: 550 ml / NET: -550 ml        PHYSICAL EXAM:     GENERAL: no acute distress  HEENT: NC/AT, EOMI, neck supple, MMM  RESPIRATORY: LCTAB/L, no rhonchi, rales, or wheezing  CARDIOVASCULAR: RRR, no murmurs, gallops, rubs  ABDOMINAL: soft, non-tender, non-distended, positive bowel sounds   EXTREMITIES: no clubbing, cyanosis, or edema  NEUROLOGICAL: alert and oriented x 3, non-focal  SKIN: no rashes or lesions   MUSCULOSKELETAL: no gross joint deformity                          11.3   4.1   )-----------( 184      ( 26 Jun 2017 07:07 )             34.1     06-26    139  |  105  |  10  ----------------------------<  90  3.5   |  25  |  0.59    Ca    8.3<L>      26 Jun 2017 07:07  Phos  2.9     06-26  Mg     1.8     06-26        CAPILLARY BLOOD GLUCOSE      MEDICATIONS  (STANDING):  heparin  Injectable 5000Unit(s) SubCutaneous every 8 hours  buDESOnide  80 MICROgram(s)/formoterol 4.5 MICROgram(s) Inhaler 2Puff(s) Inhalation two times a day  aspirin enteric coated 81milliGRAM(s) Oral daily  clopidogrel Tablet 75milliGRAM(s) Oral <User Schedule>  levothyroxine 75MICROGram(s) Oral daily  citalopram 10milliGRAM(s) Oral daily  doxazosin 4milliGRAM(s) Oral at bedtime  tamsulosin 0.4milliGRAM(s) Oral at bedtime  cefTRIAXone   IVPB 1Gram(s) IV Intermittent every 24 hours  multivitamin 1Tablet(s) Oral daily  thiamine 100milliGRAM(s) Oral daily  senna 2Tablet(s) Oral at bedtime  docusate sodium 100milliGRAM(s) Oral two times a day  bisacodyl 5milliGRAM(s) Oral every 12 hours  polyethylene glycol 3350 17Gram(s) Oral daily      Allergies    No Known Allergies    Intolerances        Radiology

## 2017-06-26 NOTE — PROGRESS NOTE ADULT - ASSESSMENT
82M w/ PMHx of Prostate Ca (s/p RT), BPH, Emphysema/Asthma, Hypothyroid, Chronic LBP, prior UTI with admission in 5/2017 for sepsis 2/2 UTI, p/w fever x 1-2 days, found to be tachycardic - SIRS w/ + UA - sepsis 2/2 complicated UTI (male).
82M w/ PMHx of Prostate Ca (s/p RT), BPH, Emphysema/Asthma, Hypothyroid, Chronic LBP, prior UTI with admission in 5/2017 for sepsis 2/2 UTI, p/w fever x 1-2 days, found to be tachycardic - SIRS w/ + UA - sepsis 2/2 complicated UTI (male). Patient hemodynamically stable, HR of 82 and BP of 141/74. WBC trend down 5.1 -> 3.8. Urine output 750 ml yellow urine, not turbid.
82M w/ PMHx of Prostate Ca (s/p RT), BPH, Emphysema/Asthma, Hypothyroid, Chronic LBP, prior UTI with admission in 5/2017 for sepsis 2/2 UTI, p/w fever x 1-2 days, found to be tachycardic - SIRS w/ + UA - sepsis 2/2 complicated UTI (male). Patient hemodynamically stable, HR of 86 and BP of 132/85. WBC trend down 3.8 -> 4.1. Urine output 550 ml yellow urine, not turbid.  Plan for discharge today.
82M w/ PMHx of Prostate Ca (s/p RT), BPH, Emphysema/Asthma, Hypothyroid, Chronic LBP, prior UTI with admission in 5/2017 for sepsis 2/2 UTI, p/w fever x 1-2 days, found to be tachycardic - SIRS w/ + UA - sepsis 2/2 complicated UTI (male).

## 2017-06-26 NOTE — PROGRESS NOTE ADULT - PROBLEM SELECTOR PROBLEM 4
BPH (benign prostatic hypertrophy) with urinary retention

## 2017-06-26 NOTE — PROGRESS NOTE ADULT - PROBLEM SELECTOR PLAN 3
-c/w Tez, Symbicort, no signs of exacerbation

## 2017-06-27 ENCOUNTER — MESSAGE (OUTPATIENT)
Age: 82
End: 2017-06-27

## 2017-07-05 ENCOUNTER — APPOINTMENT (OUTPATIENT)
Dept: INTERNAL MEDICINE | Facility: CLINIC | Age: 82
End: 2017-07-05

## 2017-07-05 VITALS
BODY MASS INDEX: 23.73 KG/M2 | SYSTOLIC BLOOD PRESSURE: 149 MMHG | OXYGEN SATURATION: 93 % | HEART RATE: 68 BPM | TEMPERATURE: 97.8 F | WEIGHT: 175 LBS | DIASTOLIC BLOOD PRESSURE: 83 MMHG

## 2017-07-05 LAB
BILIRUB UR QL STRIP: NEGATIVE
GLUCOSE UR-MCNC: NEGATIVE
HCG UR QL: 0.2 EU/DL
HGB UR QL STRIP.AUTO: NEGATIVE
KETONES UR-MCNC: NEGATIVE
LEUKOCYTE ESTERASE UR QL STRIP: NEGATIVE
NITRITE UR QL STRIP: NEGATIVE
PH UR STRIP: 7
PROT UR STRIP-MCNC: NEGATIVE
SP GR UR STRIP: 1.01

## 2017-07-05 RX ORDER — SULFAMETHOXAZOLE AND TRIMETHOPRIM 800; 160 MG/1; MG/1
800-160 TABLET ORAL
Qty: 14 | Refills: 0 | Status: DISCONTINUED | COMMUNITY
Start: 2017-06-21 | End: 2017-07-05

## 2017-07-21 ENCOUNTER — APPOINTMENT (OUTPATIENT)
Dept: INTERNAL MEDICINE | Facility: CLINIC | Age: 82
End: 2017-07-21

## 2017-07-21 VITALS
TEMPERATURE: 97.9 F | OXYGEN SATURATION: 95 % | SYSTOLIC BLOOD PRESSURE: 164 MMHG | HEART RATE: 76 BPM | BODY MASS INDEX: 23.87 KG/M2 | WEIGHT: 176 LBS | DIASTOLIC BLOOD PRESSURE: 84 MMHG

## 2017-07-21 DIAGNOSIS — R63.4 ABNORMAL WEIGHT LOSS: ICD-10-CM

## 2017-08-04 ENCOUNTER — RX RENEWAL (OUTPATIENT)
Age: 82
End: 2017-08-04

## 2017-08-14 ENCOUNTER — FORM ENCOUNTER (OUTPATIENT)
Age: 82
End: 2017-08-14

## 2017-08-15 ENCOUNTER — APPOINTMENT (OUTPATIENT)
Dept: ULTRASOUND IMAGING | Facility: IMAGING CENTER | Age: 82
End: 2017-08-15
Payer: MEDICARE

## 2017-08-15 ENCOUNTER — OUTPATIENT (OUTPATIENT)
Dept: OUTPATIENT SERVICES | Facility: HOSPITAL | Age: 82
LOS: 1 days | End: 2017-08-15
Payer: MEDICARE

## 2017-08-15 ENCOUNTER — APPOINTMENT (OUTPATIENT)
Dept: UROLOGY | Facility: CLINIC | Age: 82
End: 2017-08-15

## 2017-08-15 DIAGNOSIS — R33.8 OTHER RETENTION OF URINE: ICD-10-CM

## 2017-08-15 PROCEDURE — 76770 US EXAM ABDO BACK WALL COMP: CPT | Mod: 26

## 2017-08-15 PROCEDURE — 76770 US EXAM ABDO BACK WALL COMP: CPT

## 2017-08-21 ENCOUNTER — APPOINTMENT (OUTPATIENT)
Dept: INTERNAL MEDICINE | Facility: CLINIC | Age: 82
End: 2017-08-21
Payer: MEDICARE

## 2017-08-21 VITALS
DIASTOLIC BLOOD PRESSURE: 84 MMHG | TEMPERATURE: 97.8 F | SYSTOLIC BLOOD PRESSURE: 153 MMHG | OXYGEN SATURATION: 98 % | HEIGHT: 72 IN | HEART RATE: 76 BPM | BODY MASS INDEX: 24.38 KG/M2 | WEIGHT: 180 LBS

## 2017-08-21 PROCEDURE — 99214 OFFICE O/P EST MOD 30 MIN: CPT

## 2017-08-21 RX ORDER — CITALOPRAM HYDROBROMIDE 10 MG/1
10 TABLET, FILM COATED ORAL DAILY
Qty: 30 | Refills: 5 | Status: DISCONTINUED | COMMUNITY
Start: 2017-04-20 | End: 2017-08-21

## 2017-09-01 ENCOUNTER — APPOINTMENT (OUTPATIENT)
Dept: UROLOGY | Facility: CLINIC | Age: 82
End: 2017-09-01
Payer: MEDICARE

## 2017-09-01 PROCEDURE — 99213 OFFICE O/P EST LOW 20 MIN: CPT

## 2017-09-05 LAB — BACTERIA UR CULT: NORMAL

## 2017-09-08 ENCOUNTER — MOBILE ON CALL (OUTPATIENT)
Age: 82
End: 2017-09-08

## 2017-09-11 ENCOUNTER — RX RENEWAL (OUTPATIENT)
Age: 82
End: 2017-09-11

## 2017-09-12 ENCOUNTER — APPOINTMENT (OUTPATIENT)
Dept: UROLOGY | Facility: CLINIC | Age: 82
End: 2017-09-12
Payer: MEDICARE

## 2017-09-12 ENCOUNTER — OUTPATIENT (OUTPATIENT)
Dept: OUTPATIENT SERVICES | Facility: HOSPITAL | Age: 82
LOS: 1 days | End: 2017-09-12
Payer: MEDICARE

## 2017-09-12 VITALS
TEMPERATURE: 97.8 F | SYSTOLIC BLOOD PRESSURE: 178 MMHG | RESPIRATION RATE: 17 BRPM | HEART RATE: 71 BPM | DIASTOLIC BLOOD PRESSURE: 92 MMHG

## 2017-09-12 DIAGNOSIS — R35.0 FREQUENCY OF MICTURITION: ICD-10-CM

## 2017-09-12 PROCEDURE — 52000 CYSTOURETHROSCOPY: CPT

## 2017-09-14 DIAGNOSIS — N39.0 URINARY TRACT INFECTION, SITE NOT SPECIFIED: ICD-10-CM

## 2017-10-08 ENCOUNTER — RX RENEWAL (OUTPATIENT)
Age: 82
End: 2017-10-08

## 2017-10-12 ENCOUNTER — APPOINTMENT (OUTPATIENT)
Dept: INTERNAL MEDICINE | Facility: CLINIC | Age: 82
End: 2017-10-12

## 2017-10-24 ENCOUNTER — APPOINTMENT (OUTPATIENT)
Dept: UROLOGY | Facility: CLINIC | Age: 82
End: 2017-10-24
Payer: MEDICARE

## 2017-10-24 PROCEDURE — 99213 OFFICE O/P EST LOW 20 MIN: CPT

## 2017-10-24 PROCEDURE — 51798 US URINE CAPACITY MEASURE: CPT

## 2017-10-30 ENCOUNTER — RESULT REVIEW (OUTPATIENT)
Age: 82
End: 2017-10-30

## 2017-10-30 LAB — BACTERIA UR CULT: ABNORMAL

## 2017-11-08 ENCOUNTER — RX RENEWAL (OUTPATIENT)
Age: 82
End: 2017-11-08

## 2017-11-13 ENCOUNTER — APPOINTMENT (OUTPATIENT)
Dept: INTERNAL MEDICINE | Facility: CLINIC | Age: 82
End: 2017-11-13

## 2017-11-14 ENCOUNTER — APPOINTMENT (OUTPATIENT)
Dept: UROLOGY | Facility: CLINIC | Age: 82
End: 2017-11-14
Payer: MEDICARE

## 2017-11-14 PROCEDURE — 51798 US URINE CAPACITY MEASURE: CPT

## 2017-11-14 PROCEDURE — 99213 OFFICE O/P EST LOW 20 MIN: CPT

## 2017-11-17 LAB — BACTERIA UR CULT: NORMAL

## 2017-11-26 ENCOUNTER — EMERGENCY (EMERGENCY)
Facility: HOSPITAL | Age: 82
LOS: 1 days | Discharge: HOME | End: 2017-11-26
Attending: EMERGENCY MEDICINE | Admitting: EMERGENCY MEDICINE
Payer: MEDICARE

## 2017-11-26 VITALS
SYSTOLIC BLOOD PRESSURE: 140 MMHG | HEART RATE: 80 BPM | TEMPERATURE: 98 F | DIASTOLIC BLOOD PRESSURE: 75 MMHG | OXYGEN SATURATION: 99 % | RESPIRATION RATE: 18 BRPM

## 2017-11-26 VITALS
DIASTOLIC BLOOD PRESSURE: 88 MMHG | RESPIRATION RATE: 18 BRPM | SYSTOLIC BLOOD PRESSURE: 137 MMHG | HEART RATE: 90 BPM | OXYGEN SATURATION: 99 %

## 2017-11-26 PROCEDURE — 73110 X-RAY EXAM OF WRIST: CPT | Mod: 26,RT

## 2017-11-26 PROCEDURE — 73090 X-RAY EXAM OF FOREARM: CPT | Mod: 26,RT

## 2017-11-26 PROCEDURE — 73630 X-RAY EXAM OF FOOT: CPT | Mod: 26,LT

## 2017-11-26 PROCEDURE — 99284 EMERGENCY DEPT VISIT MOD MDM: CPT | Mod: GC

## 2017-11-26 PROCEDURE — 73130 X-RAY EXAM OF HAND: CPT | Mod: 26,RT

## 2017-11-26 NOTE — ED PROVIDER NOTE - PHYSICAL EXAMINATION
R Hand/Wrist: + swelling over dorsum of hand, +ecchymosis, +ttp, FROM of digits and wrist, NVI, sensate intact.  3/5 secondary to pain.  Cap refill < 2 sec.   NEURO: EOMi, PERRLA, visual fields intact, tongue midline, negative romberg, strength 5/5 UE and LE bilaterally, normal gait, facial expressions intact, point to point intact, rapid alternating movements intact, sensate intact to UE and LE bilaterally. NVI

## 2017-11-26 NOTE — ED PROVIDER NOTE - PROGRESS NOTE DETAILS
FAM Rogers: XR hand/wrist/forearm negative for acute fracture. On foot XR: Acute transverse nondisplaced fracture of the midshaft of the distal   phalanx of the left first toe. There is surrounding soft tissue swelling. Will place in hard sole shoe and have follow up with podiatry. Pt agrees with plan.

## 2017-11-26 NOTE — ED ADULT NURSE NOTE - OBJECTIVE STATEMENT
Patient has c/o right arm pain after a fall. Pt evaluated by MD and waiting for xray of hand/wrist/fingers.  PATRICK Perez

## 2017-11-26 NOTE — ED ADULT TRIAGE NOTE - CHIEF COMPLAINT QUOTE
Pt states he tripped over the rug and fell hitting his rt hand on a table.  Denies LOC.  Arrives to triage w large hematoma to rt hand.  pt c/o pain to area. Pt takes plavix.  h/o HTN

## 2017-11-26 NOTE — ED PROVIDER NOTE - PLAN OF CARE
Follow up with podiatry for toe fracture. Rest, drink plenty of fluids.  Advance activity as tolerated.  Continue all previously prescribed medications as directed. You can use motrin 600mg every 6-8 hours for pain or fever, and/or Tylenol 650 mg every 4 hours for pain/fever. Follow up with your primary care physician in 48-72 hours- bring copies of your results.  Return to the emergency department for chest pain, shortness of breath, dizziness, or worsening, concerning, or persistent symptoms.

## 2017-11-26 NOTE — ED PROVIDER NOTE - OBJECTIVE STATEMENT
83 yo M here for trip and fall at  home this afternoon. Reports he was walking on throw rug and tripped over it. Reports fell onto right wrist and hit on table on the fall down. Denies head injury or LOC. On Plavix twice per day and ASA daily. Noted bruising and swelling to right hand so came to ED. Denies preceding symptoms, states simply tripped over rug. Also noted he stubbed his L great toe and there was bleeding by the nail bed which now resolved. Denies fever chills vomiting diarrhea cp sob weakness numbness tingling HA dizziness vision change.

## 2017-11-26 NOTE — ED PROVIDER NOTE - ATTENDING CONTRIBUTION TO CARE
Attending Note (Sonam): patient with mechanical fall.  tripped and hit left foot and right hand.  r/o fracture.  well appearing.  no other complaints.  no c-spine tenderness.

## 2017-11-26 NOTE — ED PROVIDER NOTE - CHPI ED SYMPTOMS NEG
no confusion/no abrasion/no weakness/no numbness/no tingling/no vomiting/no fever/no loss of consciousness

## 2017-11-26 NOTE — ED PROVIDER NOTE - MEDICAL DECISION MAKING DETAILS
81 yo M RHD here for trip and fall. otherwise well. Denies head trauma or LOC. + right hand/wrist pain and swelling. Denies additional complaints. PLAN: xr, pain control PRN.

## 2017-11-26 NOTE — ED PROVIDER NOTE - CARE PLAN
Principal Discharge DX:	Toe fracture  Instructions for follow-up, activity and diet:	Follow up with podiatry for toe fracture. Rest, drink plenty of fluids.  Advance activity as tolerated.  Continue all previously prescribed medications as directed. You can use motrin 600mg every 6-8 hours for pain or fever, and/or Tylenol 650 mg every 4 hours for pain/fever. Follow up with your primary care physician in 48-72 hours- bring copies of your results.  Return to the emergency department for chest pain, shortness of breath, dizziness, or worsening, concerning, or persistent symptoms.

## 2017-12-01 ENCOUNTER — APPOINTMENT (OUTPATIENT)
Dept: INTERNAL MEDICINE | Facility: CLINIC | Age: 82
End: 2017-12-01
Payer: MEDICARE

## 2017-12-01 VITALS
TEMPERATURE: 97.5 F | SYSTOLIC BLOOD PRESSURE: 114 MMHG | WEIGHT: 180 LBS | DIASTOLIC BLOOD PRESSURE: 69 MMHG | BODY MASS INDEX: 24.38 KG/M2 | HEART RATE: 99 BPM | OXYGEN SATURATION: 96 % | HEIGHT: 72 IN

## 2017-12-01 PROCEDURE — 99213 OFFICE O/P EST LOW 20 MIN: CPT

## 2017-12-06 ENCOUNTER — RX RENEWAL (OUTPATIENT)
Age: 82
End: 2017-12-06

## 2018-01-12 ENCOUNTER — APPOINTMENT (OUTPATIENT)
Dept: UROLOGY | Facility: CLINIC | Age: 83
End: 2018-01-12
Payer: MEDICARE

## 2018-01-12 PROCEDURE — 99213 OFFICE O/P EST LOW 20 MIN: CPT

## 2018-01-12 PROCEDURE — 51798 US URINE CAPACITY MEASURE: CPT

## 2018-01-15 LAB — BACTERIA UR CULT: NORMAL

## 2018-01-16 ENCOUNTER — RX RENEWAL (OUTPATIENT)
Age: 83
End: 2018-01-16

## 2018-02-06 ENCOUNTER — APPOINTMENT (OUTPATIENT)
Dept: PULMONOLOGY | Facility: CLINIC | Age: 83
End: 2018-02-06
Payer: MEDICARE

## 2018-02-06 VITALS
TEMPERATURE: 97.3 F | HEIGHT: 72 IN | OXYGEN SATURATION: 96 % | SYSTOLIC BLOOD PRESSURE: 124 MMHG | WEIGHT: 185 LBS | DIASTOLIC BLOOD PRESSURE: 70 MMHG | RESPIRATION RATE: 15 BRPM | HEART RATE: 84 BPM | BODY MASS INDEX: 25.06 KG/M2

## 2018-02-06 PROCEDURE — 99213 OFFICE O/P EST LOW 20 MIN: CPT | Mod: GC

## 2018-02-06 RX ORDER — CEPHALEXIN 500 MG/1
500 CAPSULE ORAL 3 TIMES DAILY
Qty: 15 | Refills: 0 | Status: DISCONTINUED | COMMUNITY
Start: 2017-11-02 | End: 2018-02-06

## 2018-02-06 RX ORDER — CIPROFLOXACIN HYDROCHLORIDE 500 MG/1
500 TABLET, FILM COATED ORAL TWICE DAILY
Qty: 14 | Refills: 0 | Status: DISCONTINUED | COMMUNITY
Start: 2017-09-01 | End: 2018-02-06

## 2018-02-14 ENCOUNTER — RX RENEWAL (OUTPATIENT)
Age: 83
End: 2018-02-14

## 2018-02-14 ENCOUNTER — APPOINTMENT (OUTPATIENT)
Dept: INTERNAL MEDICINE | Facility: CLINIC | Age: 83
End: 2018-02-14
Payer: MEDICARE

## 2018-02-14 VITALS
OXYGEN SATURATION: 94 % | WEIGHT: 185 LBS | BODY MASS INDEX: 25.06 KG/M2 | HEART RATE: 83 BPM | SYSTOLIC BLOOD PRESSURE: 162 MMHG | DIASTOLIC BLOOD PRESSURE: 80 MMHG | HEIGHT: 72 IN | TEMPERATURE: 97.8 F

## 2018-02-14 DIAGNOSIS — J30.9 ALLERGIC RHINITIS, UNSPECIFIED: ICD-10-CM

## 2018-02-14 PROCEDURE — 99213 OFFICE O/P EST LOW 20 MIN: CPT

## 2018-03-09 ENCOUNTER — APPOINTMENT (OUTPATIENT)
Dept: UROLOGY | Facility: CLINIC | Age: 83
End: 2018-03-09
Payer: MEDICARE

## 2018-03-09 PROCEDURE — 99212 OFFICE O/P EST SF 10 MIN: CPT

## 2018-03-12 LAB
APPEARANCE: CLEAR
BACTERIA UR CULT: NORMAL
BACTERIA: NEGATIVE
BILIRUBIN URINE: NEGATIVE
BLOOD URINE: NEGATIVE
COLOR: ABNORMAL
GLUCOSE QUALITATIVE U: NEGATIVE MG/DL
HYALINE CASTS: 5 /LPF
KETONES URINE: NEGATIVE
LEUKOCYTE ESTERASE URINE: NEGATIVE
MICROSCOPIC-UA: NORMAL
NITRITE URINE: NEGATIVE
PH URINE: 6
PROTEIN URINE: NEGATIVE MG/DL
RED BLOOD CELLS URINE: 4 /HPF
SPECIFIC GRAVITY URINE: 1.02
SQUAMOUS EPITHELIAL CELLS: 2 /HPF
UROBILINOGEN URINE: NEGATIVE MG/DL
WHITE BLOOD CELLS URINE: 2 /HPF

## 2018-04-13 ENCOUNTER — RX RENEWAL (OUTPATIENT)
Age: 83
End: 2018-04-13

## 2018-04-17 ENCOUNTER — APPOINTMENT (OUTPATIENT)
Dept: UROLOGY | Facility: CLINIC | Age: 83
End: 2018-04-17
Payer: MEDICARE

## 2018-04-17 PROCEDURE — 51798 US URINE CAPACITY MEASURE: CPT

## 2018-04-17 PROCEDURE — 99213 OFFICE O/P EST LOW 20 MIN: CPT

## 2018-04-18 LAB
BILIRUB UR QL STRIP: NORMAL
GLUCOSE UR-MCNC: NORMAL
HCG UR QL: 0.2 EU/DL
HGB UR QL STRIP.AUTO: NORMAL
KETONES UR-MCNC: NORMAL
LEUKOCYTE ESTERASE UR QL STRIP: NORMAL
NITRITE UR QL STRIP: NORMAL
PH UR STRIP: 6.5
PROT UR STRIP-MCNC: NORMAL
SP GR UR STRIP: 1.02

## 2018-06-25 ENCOUNTER — MEDICATION RENEWAL (OUTPATIENT)
Age: 83
End: 2018-06-25

## 2018-07-10 ENCOUNTER — APPOINTMENT (OUTPATIENT)
Dept: UROLOGY | Facility: CLINIC | Age: 83
End: 2018-07-10
Payer: MEDICARE

## 2018-07-10 PROCEDURE — 99213 OFFICE O/P EST LOW 20 MIN: CPT | Mod: 25

## 2018-07-10 PROCEDURE — 51798 US URINE CAPACITY MEASURE: CPT

## 2018-07-11 LAB — PSA SERPL-MCNC: 0.04 NG/ML

## 2018-07-17 ENCOUNTER — APPOINTMENT (OUTPATIENT)
Dept: UROLOGY | Facility: CLINIC | Age: 83
End: 2018-07-17

## 2018-07-17 ENCOUNTER — TRANSCRIPTION ENCOUNTER (OUTPATIENT)
Age: 83
End: 2018-07-17

## 2018-07-17 ENCOUNTER — INPATIENT (INPATIENT)
Facility: HOSPITAL | Age: 83
LOS: 6 days | Discharge: HOME HEALTH | End: 2018-07-24
Attending: HOSPITALIST | Admitting: HOSPITALIST
Payer: MEDICARE

## 2018-07-17 VITALS
OXYGEN SATURATION: 96 % | RESPIRATION RATE: 18 BRPM | SYSTOLIC BLOOD PRESSURE: 156 MMHG | TEMPERATURE: 98 F | DIASTOLIC BLOOD PRESSURE: 97 MMHG | HEART RATE: 89 BPM

## 2018-07-17 DIAGNOSIS — I10 ESSENTIAL (PRIMARY) HYPERTENSION: ICD-10-CM

## 2018-07-17 DIAGNOSIS — R10.13 EPIGASTRIC PAIN: ICD-10-CM

## 2018-07-17 DIAGNOSIS — J45.40 MODERATE PERSISTENT ASTHMA, UNCOMPLICATED: ICD-10-CM

## 2018-07-17 DIAGNOSIS — E03.9 HYPOTHYROIDISM, UNSPECIFIED: ICD-10-CM

## 2018-07-17 DIAGNOSIS — N40.0 BENIGN PROSTATIC HYPERPLASIA WITHOUT LOWER URINARY TRACT SYMPTOMS: ICD-10-CM

## 2018-07-17 DIAGNOSIS — Z29.9 ENCOUNTER FOR PROPHYLACTIC MEASURES, UNSPECIFIED: ICD-10-CM

## 2018-07-17 PROBLEM — N40.1 BENIGN PROSTATIC HYPERPLASIA WITH LOWER URINARY TRACT SYMPTOMS: Chronic | Status: ACTIVE | Noted: 2017-05-06

## 2018-07-17 LAB
ALBUMIN SERPL ELPH-MCNC: 3.6 G/DL — SIGNIFICANT CHANGE UP (ref 3.3–5)
ALP SERPL-CCNC: 100 U/L — SIGNIFICANT CHANGE UP (ref 40–120)
ALT FLD-CCNC: 18 U/L — SIGNIFICANT CHANGE UP (ref 4–41)
APTT BLD: 37.3 SEC — SIGNIFICANT CHANGE UP (ref 27.5–37.4)
AST SERPL-CCNC: 35 U/L — SIGNIFICANT CHANGE UP (ref 4–40)
BASOPHILS # BLD AUTO: 0.03 K/UL — SIGNIFICANT CHANGE UP (ref 0–0.2)
BASOPHILS NFR BLD AUTO: 0.4 % — SIGNIFICANT CHANGE UP (ref 0–2)
BILIRUB SERPL-MCNC: 0.8 MG/DL — SIGNIFICANT CHANGE UP (ref 0.2–1.2)
BLD GP AB SCN SERPL QL: NEGATIVE — SIGNIFICANT CHANGE UP
BUN SERPL-MCNC: 12 MG/DL — SIGNIFICANT CHANGE UP (ref 7–23)
CALCIUM SERPL-MCNC: 9.3 MG/DL — SIGNIFICANT CHANGE UP (ref 8.4–10.5)
CHLORIDE SERPL-SCNC: 97 MMOL/L — LOW (ref 98–107)
CO2 SERPL-SCNC: 24 MMOL/L — SIGNIFICANT CHANGE UP (ref 22–31)
CREAT SERPL-MCNC: 0.67 MG/DL — SIGNIFICANT CHANGE UP (ref 0.5–1.3)
EOSINOPHIL # BLD AUTO: 0.13 K/UL — SIGNIFICANT CHANGE UP (ref 0–0.5)
EOSINOPHIL NFR BLD AUTO: 1.7 % — SIGNIFICANT CHANGE UP (ref 0–6)
GLUCOSE SERPL-MCNC: 105 MG/DL — HIGH (ref 70–99)
HCT VFR BLD CALC: 42.9 % — SIGNIFICANT CHANGE UP (ref 39–50)
HGB BLD-MCNC: 14.4 G/DL — SIGNIFICANT CHANGE UP (ref 13–17)
IMM GRANULOCYTES # BLD AUTO: 0.03 # — SIGNIFICANT CHANGE UP
IMM GRANULOCYTES NFR BLD AUTO: 0.4 % — SIGNIFICANT CHANGE UP (ref 0–1.5)
INR BLD: 1.02 — SIGNIFICANT CHANGE UP (ref 0.88–1.17)
LIDOCAIN IGE QN: 57 U/L — SIGNIFICANT CHANGE UP (ref 7–60)
LYMPHOCYTES # BLD AUTO: 0.91 K/UL — LOW (ref 1–3.3)
LYMPHOCYTES # BLD AUTO: 11.8 % — LOW (ref 13–44)
MCHC RBC-ENTMCNC: 31 PG — SIGNIFICANT CHANGE UP (ref 27–34)
MCHC RBC-ENTMCNC: 33.6 % — SIGNIFICANT CHANGE UP (ref 32–36)
MCV RBC AUTO: 92.5 FL — SIGNIFICANT CHANGE UP (ref 80–100)
MONOCYTES # BLD AUTO: 0.74 K/UL — SIGNIFICANT CHANGE UP (ref 0–0.9)
MONOCYTES NFR BLD AUTO: 9.6 % — SIGNIFICANT CHANGE UP (ref 2–14)
NEUTROPHILS # BLD AUTO: 5.85 K/UL — SIGNIFICANT CHANGE UP (ref 1.8–7.4)
NEUTROPHILS NFR BLD AUTO: 76.1 % — SIGNIFICANT CHANGE UP (ref 43–77)
NRBC # FLD: 0 — SIGNIFICANT CHANGE UP
PLATELET # BLD AUTO: 250 K/UL — SIGNIFICANT CHANGE UP (ref 150–400)
PMV BLD: 9.8 FL — SIGNIFICANT CHANGE UP (ref 7–13)
POTASSIUM SERPL-MCNC: 4.9 MMOL/L — SIGNIFICANT CHANGE UP (ref 3.5–5.3)
POTASSIUM SERPL-SCNC: 4.9 MMOL/L — SIGNIFICANT CHANGE UP (ref 3.5–5.3)
PROT SERPL-MCNC: 6.7 G/DL — SIGNIFICANT CHANGE UP (ref 6–8.3)
PROTHROM AB SERPL-ACNC: 11.3 SEC — SIGNIFICANT CHANGE UP (ref 9.8–13.1)
RBC # BLD: 4.64 M/UL — SIGNIFICANT CHANGE UP (ref 4.2–5.8)
RBC # FLD: 12.4 % — SIGNIFICANT CHANGE UP (ref 10.3–14.5)
RH IG SCN BLD-IMP: POSITIVE — SIGNIFICANT CHANGE UP
RH IG SCN BLD-IMP: POSITIVE — SIGNIFICANT CHANGE UP
SODIUM SERPL-SCNC: 134 MMOL/L — LOW (ref 135–145)
TROPONIN T, HIGH SENSITIVITY: 47 NG/L — SIGNIFICANT CHANGE UP (ref ?–14)
TROPONIN T, HIGH SENSITIVITY: 51 NG/L — SIGNIFICANT CHANGE UP (ref ?–14)
WBC # BLD: 7.69 K/UL — SIGNIFICANT CHANGE UP (ref 3.8–10.5)
WBC # FLD AUTO: 7.69 K/UL — SIGNIFICANT CHANGE UP (ref 3.8–10.5)

## 2018-07-17 PROCEDURE — 99223 1ST HOSP IP/OBS HIGH 75: CPT

## 2018-07-17 PROCEDURE — 74174 CTA ABD&PLVS W/CONTRAST: CPT | Mod: 26

## 2018-07-17 PROCEDURE — 71045 X-RAY EXAM CHEST 1 VIEW: CPT | Mod: 26

## 2018-07-17 PROCEDURE — 71275 CT ANGIOGRAPHY CHEST: CPT | Mod: 26

## 2018-07-17 RX ORDER — ALBUTEROL 90 UG/1
2 AEROSOL, METERED ORAL EVERY 4 HOURS
Qty: 0 | Refills: 0 | Status: DISCONTINUED | OUTPATIENT
Start: 2018-07-17 | End: 2018-07-24

## 2018-07-17 RX ORDER — IBUPROFEN 200 MG
400 TABLET ORAL ONCE
Qty: 0 | Refills: 0 | Status: COMPLETED | OUTPATIENT
Start: 2018-07-17 | End: 2018-07-17

## 2018-07-17 RX ORDER — FAMOTIDINE 10 MG/ML
20 INJECTION INTRAVENOUS ONCE
Qty: 0 | Refills: 0 | Status: COMPLETED | OUTPATIENT
Start: 2018-07-17 | End: 2018-07-17

## 2018-07-17 RX ORDER — LOSARTAN POTASSIUM 100 MG/1
1 TABLET, FILM COATED ORAL
Qty: 0 | Refills: 0 | COMMUNITY

## 2018-07-17 RX ORDER — MULTIVIT-MIN/FERROUS GLUCONATE 9 MG/15 ML
1 LIQUID (ML) ORAL
Qty: 0 | Refills: 0 | COMMUNITY

## 2018-07-17 RX ORDER — PANTOPRAZOLE SODIUM 20 MG/1
40 TABLET, DELAYED RELEASE ORAL ONCE
Qty: 0 | Refills: 0 | Status: COMPLETED | OUTPATIENT
Start: 2018-07-17 | End: 2018-07-17

## 2018-07-17 RX ORDER — ACETAMINOPHEN 500 MG
650 TABLET ORAL EVERY 6 HOURS
Qty: 0 | Refills: 0 | Status: DISCONTINUED | OUTPATIENT
Start: 2018-07-17 | End: 2018-07-24

## 2018-07-17 RX ORDER — ASPIRIN/CALCIUM CARB/MAGNESIUM 324 MG
81 TABLET ORAL DAILY
Qty: 0 | Refills: 0 | Status: DISCONTINUED | OUTPATIENT
Start: 2018-07-17 | End: 2018-07-24

## 2018-07-17 RX ORDER — LOSARTAN POTASSIUM 100 MG/1
50 TABLET, FILM COATED ORAL DAILY
Qty: 0 | Refills: 0 | Status: DISCONTINUED | OUTPATIENT
Start: 2018-07-17 | End: 2018-07-24

## 2018-07-17 RX ORDER — FLUTICASONE PROPIONATE AND SALMETEROL 50; 250 UG/1; UG/1
1 POWDER ORAL; RESPIRATORY (INHALATION)
Qty: 0 | Refills: 0 | COMMUNITY

## 2018-07-17 RX ORDER — METHENAMINE MANDELATE 1 G
1 TABLET ORAL
Qty: 0 | Refills: 0 | COMMUNITY

## 2018-07-17 RX ORDER — ASPIRIN/CALCIUM CARB/MAGNESIUM 324 MG
1 TABLET ORAL
Qty: 0 | Refills: 0 | COMMUNITY

## 2018-07-17 RX ORDER — TAMSULOSIN HYDROCHLORIDE 0.4 MG/1
1 CAPSULE ORAL
Qty: 0 | Refills: 0 | COMMUNITY

## 2018-07-17 RX ORDER — BUDESONIDE AND FORMOTEROL FUMARATE DIHYDRATE 160; 4.5 UG/1; UG/1
2 AEROSOL RESPIRATORY (INHALATION)
Qty: 0 | Refills: 0 | Status: DISCONTINUED | OUTPATIENT
Start: 2018-07-17 | End: 2018-07-24

## 2018-07-17 RX ORDER — PANTOPRAZOLE SODIUM 20 MG/1
40 TABLET, DELAYED RELEASE ORAL
Qty: 0 | Refills: 0 | Status: DISCONTINUED | OUTPATIENT
Start: 2018-07-18 | End: 2018-07-23

## 2018-07-17 RX ORDER — CLOPIDOGREL BISULFATE 75 MG/1
75 TABLET, FILM COATED ORAL
Qty: 0 | Refills: 0 | Status: DISCONTINUED | OUTPATIENT
Start: 2018-07-17 | End: 2018-07-23

## 2018-07-17 RX ORDER — MORPHINE SULFATE 50 MG/1
2 CAPSULE, EXTENDED RELEASE ORAL ONCE
Qty: 0 | Refills: 0 | Status: DISCONTINUED | OUTPATIENT
Start: 2018-07-17 | End: 2018-07-17

## 2018-07-17 RX ORDER — ONDANSETRON 8 MG/1
4 TABLET, FILM COATED ORAL ONCE
Qty: 0 | Refills: 0 | Status: COMPLETED | OUTPATIENT
Start: 2018-07-17 | End: 2018-07-17

## 2018-07-17 RX ORDER — LEVOTHYROXINE SODIUM 125 MCG
1 TABLET ORAL
Qty: 0 | Refills: 0 | COMMUNITY

## 2018-07-17 RX ORDER — SILODOSIN 4 MG/1
1 CAPSULE ORAL
Qty: 0 | Refills: 0 | COMMUNITY

## 2018-07-17 RX ORDER — LEVOTHYROXINE SODIUM 125 MCG
75 TABLET ORAL DAILY
Qty: 0 | Refills: 0 | Status: DISCONTINUED | OUTPATIENT
Start: 2018-07-17 | End: 2018-07-24

## 2018-07-17 RX ORDER — ALBUTEROL 90 UG/1
1 AEROSOL, METERED ORAL
Qty: 0 | Refills: 0 | COMMUNITY

## 2018-07-17 RX ORDER — HEPARIN SODIUM 5000 [USP'U]/ML
5000 INJECTION INTRAVENOUS; SUBCUTANEOUS EVERY 8 HOURS
Qty: 0 | Refills: 0 | Status: DISCONTINUED | OUTPATIENT
Start: 2018-07-17 | End: 2018-07-23

## 2018-07-17 RX ADMIN — HEPARIN SODIUM 5000 UNIT(S): 5000 INJECTION INTRAVENOUS; SUBCUTANEOUS at 21:26

## 2018-07-17 RX ADMIN — Medication 650 MILLIGRAM(S): at 17:29

## 2018-07-17 RX ADMIN — Medication 400 MILLIGRAM(S): at 21:26

## 2018-07-17 RX ADMIN — Medication 650 MILLIGRAM(S): at 16:59

## 2018-07-17 RX ADMIN — LOSARTAN POTASSIUM 50 MILLIGRAM(S): 100 TABLET, FILM COATED ORAL at 14:07

## 2018-07-17 RX ADMIN — Medication 30 MILLILITER(S): at 14:37

## 2018-07-17 RX ADMIN — BUDESONIDE AND FORMOTEROL FUMARATE DIHYDRATE 2 PUFF(S): 160; 4.5 AEROSOL RESPIRATORY (INHALATION) at 21:26

## 2018-07-17 RX ADMIN — Medication 400 MILLIGRAM(S): at 22:23

## 2018-07-17 RX ADMIN — PANTOPRAZOLE SODIUM 40 MILLIGRAM(S): 20 TABLET, DELAYED RELEASE ORAL at 14:37

## 2018-07-17 RX ADMIN — ALBUTEROL 2 PUFF(S): 90 AEROSOL, METERED ORAL at 17:00

## 2018-07-17 RX ADMIN — FAMOTIDINE 20 MILLIGRAM(S): 10 INJECTION INTRAVENOUS at 11:54

## 2018-07-17 RX ADMIN — ONDANSETRON 4 MILLIGRAM(S): 8 TABLET, FILM COATED ORAL at 11:54

## 2018-07-17 NOTE — ED ADULT TRIAGE NOTE - CHIEF COMPLAINT QUOTE
back and abd pain    states last night started having upper back pain and then dev mid abd pain.  denies nausea, vomiting, diarrhea, chest pain, sob.  did not take his meds this am. bp right arm 173/103....bp left arm 156/97

## 2018-07-17 NOTE — H&P ADULT - PROBLEM SELECTOR PLAN 1
Non-toxic appearing, pleasant without nausea/vomiting, and CT Abdomen/Pelvis negative for acute intracolonic pathology. Also no SIRS criteria. Likely patient with acid reflux/indigestion. Would c/w Maalox and Pepcid for epigastric discomfort and continue to monitor. Angiogram workup was negative for acute vascular issues. Non-toxic appearing, pleasant without nausea/vomiting, and CT Abdomen/Pelvis negative for acute intracolonic pathology. Also no SIRS criteria. Likely patient with acid reflux/indigestion. Would c/w Maalox and Pepcid for epigastric discomfort and continue to monitor. Angiogram workup was negative for acute vascular issues.  - Concern in ER for ACS. HsT were 51 and 47 respectively, Heart Score = 4. Patient admitted for observation will reassess in 24 h after interventions listed above. Currently doesn't appear in ACS given he is comfortable without changes in EKG.

## 2018-07-17 NOTE — DISCHARGE NOTE ADULT - SECONDARY DIAGNOSIS.
Essential hypertension Moderate persistent asthma without complication BPH (benign prostatic hyperplasia) Hypothyroid

## 2018-07-17 NOTE — DISCHARGE NOTE ADULT - PLAN OF CARE
symptom management Angiogram workup was negative for acute vascular issues. CT abdomen was negative. Follow up with GI outpatient and PCP outpatient stable continue with home blood pressure medications continue with home inhaler continue with rapaflo continue with synthroid To relieve and prevent worsening symptoms associated with congestive heart failure, to improve quality of life, and to treat underlying conditions such as coronary heart disease, high blood pressure, or diabetes, and to maintain euvolemia. Low salt diet, fluid restriction to 1500 ml daily, monitor your fluid intake and weight daily, exercise as tolerated 30 minutes daily, and follow up with your physician within 7 days.  start Toprol 12.5 mg daily and follow up with Dr Clemons upon discharge To maintain a normal blood pressure to prevent heart attack, stroke and renal failure. Low sodium and fat diet, continue anti-hypertensive medications, and follow up with primary care physician. To prevent long-term (chronic) symptoms that interfere with daily living, such as coughing, wheezing or shortness of breath during the night or after exercise.  To be able to participate in all activities of daily living, including work, school, and exercise.  To maintain near normal pulmonary function test and prevent asthma exacerbation. Low salt diet, fluid restriction to 1500 ml daily, monitor your fluid intake and weight daily, exercise as tolerated 30 minutes daily, and follow up with your physician within 7 days.  start Toprol 12.5 mg daily and follow up with Dr Clemons upon discharge. You have an appointment with the device clinic on 08/08/2018 at 2pm. Device clinic is located in the ambulatory care building on the fourth floor (oncology building).

## 2018-07-17 NOTE — H&P ADULT - NSHPLABSRESULTS_GEN_ALL_CORE
CBC Full  -  ( 17 Jul 2018 08:58 )  WBC Count : 7.69 K/uL  Hemoglobin : 14.4 g/dL  Hematocrit : 42.9 %  Platelet Count - Automated : 250 K/uL  Mean Cell Volume : 92.5 fL  Mean Cell Hemoglobin : 31.0 pg  Mean Cell Hemoglobin Concentration : 33.6 %  Auto Neutrophil # : 5.85 K/uL  Auto Lymphocyte # : 0.91 K/uL  Auto Monocyte # : 0.74 K/uL  Auto Eosinophil # : 0.13 K/uL  Auto Basophil # : 0.03 K/uL  Auto Neutrophil % : 76.1 %  Auto Lymphocyte % : 11.8 %  Auto Monocyte % : 9.6 %  Auto Eosinophil % : 1.7 %  Auto Basophil % : 0.4 %    07-17    134<L>  |  97<L>  |  12  ----------------------------<  105<H>  4.9   |  24  |  0.67    Ca    9.3      17 Jul 2018 08:00    TPro  6.7  /  Alb  3.6  /  TBili  0.8  /  DBili  x   /  AST  35  /  ALT  18  /  AlkPhos  100  07-17    Troponin T, High Sensitivity (07.17.18 @ 08:58)    Troponin T, High Sensitivity: 47:     Troponin T, High Sensitivity (07.17.18 @ 08:00)    Troponin T, High Sensitivity: 51    < from: CT Angio Chest w/ IV Cont (07.17.18 @ 10:04) >  < from: CT Angio Abdomen and Pelvis w/ IV Cont (07.17.18 @ 09:53) >  No aortic aneurysm or dissection.  1.1 cm right upper lobe groundglass nodule.   0.4 cm right upper lobe nodule.  < end of copied text > CBC Full  -  ( 17 Jul 2018 08:58 )  WBC Count : 7.69 K/uL  Hemoglobin : 14.4 g/dL  Hematocrit : 42.9 %  Platelet Count - Automated : 250 K/uL  Mean Cell Volume : 92.5 fL  Mean Cell Hemoglobin : 31.0 pg  Mean Cell Hemoglobin Concentration : 33.6 %  Auto Neutrophil # : 5.85 K/uL  Auto Lymphocyte # : 0.91 K/uL  Auto Monocyte # : 0.74 K/uL  Auto Eosinophil # : 0.13 K/uL  Auto Basophil # : 0.03 K/uL  Auto Neutrophil % : 76.1 %  Auto Lymphocyte % : 11.8 %  Auto Monocyte % : 9.6 %  Auto Eosinophil % : 1.7 %  Auto Basophil % : 0.4 %    07-17    134<L>  |  97<L>  |  12  ----------------------------<  105<H>  4.9   |  24  |  0.67    Ca    9.3      17 Jul 2018 08:00    TPro  6.7  /  Alb  3.6  /  TBili  0.8  /  DBili  x   /  AST  35  /  ALT  18  /  AlkPhos  100  07-17    Troponin T, High Sensitivity (07.17.18 @ 08:58)    Troponin T, High Sensitivity: 47:     Troponin T, High Sensitivity (07.17.18 @ 08:00)    Troponin T, High Sensitivity: 51    < from: CT Angio Chest w/ IV Cont (07.17.18 @ 10:04) >  < from: CT Angio Abdomen and Pelvis w/ IV Cont (07.17.18 @ 09:53) >  No aortic aneurysm or dissection.  1.1 cm right upper lobe groundglass nodule.   0.4 cm right upper lobe nodule.  < end of copied text >    EKG: NSR, LAFB (unchanged EKG from prior)

## 2018-07-17 NOTE — ED PROVIDER NOTE - ATTENDING CONTRIBUTION TO CARE
History and physical above obtained and documented (or dictated) by me (attending physician).  ACP involved in case for assistance with disposition and may document involvement as necessary via progress note(s).   -Dr. Kuhn

## 2018-07-17 NOTE — H&P ADULT - PROBLEM SELECTOR PLAN 6
IMPROVE VTE Individual Risk Assessment, Score = 2 c/w HSQ for DVT ppx           RISK                                                          Points  [  ] Previous VTE                                               3  [  ] Thrombophilia                                            2  [  ] Lower limb paresis/paralysis                    2    [  ] Active Cancer (in last 6 months)              2   [ x ] Immobilization > 24 hrs                             1  [  ] ICU/CCU stay > 24 hours                            1  [ x ] Age > 60                                                        1                                            Total Score ____2_____

## 2018-07-17 NOTE — ED ADULT NURSE REASSESSMENT NOTE - NS ED NURSE REASSESS COMMENT FT1
Pt refusing morphine at this time.  States pain isn't bad enough to get morphine.  Instructed to notify RN if pain increases.  Kept NPO.  Awaiting CT

## 2018-07-17 NOTE — H&P ADULT - HISTORY OF PRESENT ILLNESS
83M hx of Asthma, HTN, Hypothyroidism, BPH, Prostate CA s/p RT presents to [facility] ED 83M hx of Asthma, HTN, Hypothyroidism, BPH, Prostate CA s/p RT presents to [facility] ED c/o 1 day of abdominal discomfort. As per patient, yesterday felt a band-like discomfort in his abdomen described as crampy and non-radiating. Yesterday he had steak and a glass of wine, after this meal the pain began a few hours later. He denies fevers, chills, and he also says "I was never nauseous and I never vomited, I do feel gassy." He has a BM once a day and it is well formed. Patient tried tylenol and mylanta which had some success with dealing with the abdominal discomfort, but because it continued to persist he came to the ER. He also notes that he had upper thoracic back pain that was starting to feel worse than usual, he does admit to baseline back pain "between the shoulder blades."     In the ED patient received Zofran 4 mg IV x 1 and Pepcid 20 mg IV x 1.

## 2018-07-17 NOTE — SOCIAL WORK INITIAL EVALUATION ADULT - PLAN
CM and SW spoke with patient and spouse at bedside in ED.  Patient is a very pleasant 83 year old, white, Denominational,  male.  Patient is alert and oriented x4, independent with ADL's.  Patient lives with spouse in private home.  No needs identified at this time, patient and spouse managing well at home.  Provided emotional support for ED visit.

## 2018-07-17 NOTE — ED PROVIDER NOTE - OBJECTIVE STATEMENT
Pertinent PMH/PSH/FHx/SHx and Review of Systems contained within:  82yo M pmh inclusive of htn, prostate s/p RT, bph, asthma, c/o back pain which began last night while sitting on couch, sharp, "between shoulder blades", constant, ?rad to abd today, and now having both back and abd pain. States has never felt this before. No fever/chills, No photophobia/eye pain/changes in vision, No ear pain/sore throat/dysphagia, No cough/wheeze/stridor, No N/V/D, no dysuria/frequency/discharge, No neck  pain, no rash, no new focal neuro symptoms. Pertinent PMH/PSH/FHx/SHx and Review of Systems contained within:  82yo M pmh inclusive of htn, prostate s/p RT, bph, asthma, c/o back pain which began last night while sitting on couch, sharp, "between shoulder blades", constant, ?rad to abd today, and now having both back and abd pain. States he has never felt this before. No fever/chills, No photophobia/eye pain/changes in vision, No ear pain/sore throat/dysphagia, No cough/wheeze/stridor, No N/V/D, no dysuria/frequency/discharge, No neck  pain, no rash, no new focal neuro symptoms.    FAM Abdi: 83 year old female with a with PMHx of Prostate Ca (s/p RT), BPH, Emphysema/Asthma, Hypothyroid, Chronic LBP pw periumbilical pain that began today at 05:30. Pain is 7/10 in severity, worsening and constant, pain is periumbilical and non-radiating - pt has taken Tylenol with no relief, no alleviating/associated factors. Pt states that he is also having thoracic back pain that is worse than normal. Last bowel movement yesterday am - not passing gas. Denies n/v/f/c, CP, SOB, dysuria, hematuria, diarrhea, melena, hematochezia.

## 2018-07-17 NOTE — DISCHARGE NOTE ADULT - PATIENT PORTAL LINK FT
You can access the [facility] Patient Portal, offered by [facility], by registering with the following website: [email or web address]

## 2018-07-17 NOTE — DISCHARGE NOTE ADULT - HOSPITAL COURSE
82yo M pmh inclusive of htn, prostate s/p RT, bph, asthma, c/o back pain which began last night while sitting on couch, sharp, "between shoulder blades", constant, ?rad to abd today, and now having both back and abd pain. 82yo M pmh inclusive of htn, prostate s/p RT, bph, asthma, c/o back pain which began last night while sitting on couch, sharp, "between shoulder blades", constant, ?rad to abd today, and now having both back and abd pain.   Epigastric pain  - Non-toxic appearing, pleasant without nausea/vomiting  - CT Abdomen/Pelvis negative for acute intracolonic pathology  - Likely patient with acid reflux/indigestion.  -c/w Maalox and Pepcid for epigastric discomfort   -Angiogram workup was negative for acute vascular issues.     Essential hypertension  -c/w home medications as previously prescribed. Likely elevated BP today 2/2 not taking his medications yet. Continue to monitor BP.     Hypothyroid  -c/w Synthroid as previously prescribed.     BPH  On rapaflo at home, will change to Flomax for hospital stay.     Moderate persistent asthma without complication  -c/w Advair's therapeutic interchange and RENÉ  No respiratory distress or wheezing at this time.     Prophylactic measure  IMPROVE VTE Individual Risk Assessment, Score = 2 c/w HSQ for DVT ppx     7/18 CT pelvis/abd severe narrowing of the left   internal iliac artery, along with narrowing of a right internal iliac     7/18 CATH: EF 30, normal coronary  arteries; RRA access   Transfer to Tele for monitoring; report given Tele FAM Escobar 82yo M with PMHx inclusive of HTN, prostate Ca s/p RT, BPH, asthma, admitted with NSTEMI. Cardiac catheterization revealed non obstructive CAD. Echocardiogram shows severe global LV systolic dysfunction with EF 29%. Patient had episode of sinus tachycardia @ 115 bpm followed by transient 2:1 AV block while trying to get out of bed. He reports feeling like losing balance while trying to stand up. Patient had normal AV conduction at 115 bpm prior to the episode. Episode of transient 2:1 AV block when trying to get OOB. There was no significant P-P prolongation so unlikely vagally mediated. Also, patient has severe global LV dysfunction and would require BB for optimal medical management. Patient is s/p ICD implantation for primary prevention. Device site clean, dry and intact with no signs of bleeding or hematoma. Device teaching given to patient and daughter and they demonstrate understanding of the instructions. CXR shows no pneumothorax.  - Continue optimal medical management with ARB and start beta blocker therapy  - May D/C from P standpoint  - F/U with device clinic on 8/8/18 @ 2pm 84 yo M [employer]  with PMHx inclusive of HTN, prostate Ca s/p RT, BPH, asthma, admitted with NSTEMI. Cardiac catheterization revealed non obstructive CAD. Echocardiogram shows severe global LV systolic dysfunction with EF 29%. Patient had episode of sinus tachycardia @ 115 bpm followed by transient 2:1 AV block while trying to get out of bed. He reports feeling like losing balance while trying to stand up. Patient had normal AV conduction at 115 bpm prior to the episode. Episode of transient 2:1 AV block when trying to get OOB. There was no significant P-P prolongation so unlikely vagally mediated. Also, patient has severe global LV dysfunction and would require BB for optimal medical management. Patient is s/p ICD implantation for primary prevention. Device site clean, dry and intact with no signs of bleeding or hematoma. Device teaching given to patient and daughter and they demonstrate understanding of the instructions. CXR shows no pneumothorax.  - Continue optimal medical management with ARB and start beta blocker therapy  - F/U with device clinic on 8/8/18 @ 2pm

## 2018-07-17 NOTE — DISCHARGE NOTE ADULT - MEDICATION SUMMARY - MEDICATIONS TO TAKE
I will START or STAY ON the medications listed below when I get home from the hospital:    Aspirin Enteric Coated 81 mg oral delayed release tablet  -- 1 tab(s) by mouth once a day  -- Indication: For Blood thinner    losartan 50 mg oral tablet  -- 1 tab(s) by mouth once a day  -- Indication: For Essential hypertension    Rapaflo 8 mg oral capsule  -- 1 cap(s) by mouth once a day (in the morning after a meal)  -- Indication: For BPH (benign prostatic hyperplasia)    atorvastatin 20 mg oral tablet  -- 1 tab(s) by mouth once a day (at bedtime)  -- Indication: For CAD    Advair Diskus 100 mcg-50 mcg inhalation powder  -- 1 puff(s) inhaled 2 times a day  -- Indication: For ASTHMA    ProAir HFA 90 mcg/inh inhalation aerosol  -- 1 puff(s) inhaled , As Needed  -- Indication: For ASTHMA    furosemide 20 mg oral tablet  -- 1 tab(s) by mouth once a day  -- Indication: For Chronic systolic heart failure    fluticasone 50 mcg/inh nasal spray  -- 1 spray(s) into nose 2 times a day  -- Indication: For NASAL CONGESTION    Synthroid 75 mcg (0.075 mg) oral tablet  -- 1 tab(s) by mouth once a day  -- Indication: For Hypothyroid    methenamine hippurate 1 g oral tablet  -- 1 tab(s) by mouth once a day (at bedtime)  -- Indication: For BPH (benign prostatic hyperplasia)    Centrum Silver oral tablet  -- 1 tab(s) by mouth once a day  -- Indication: For SUPPLEMENT    thiamine 100 mg oral tablet  -- 1 tab(s) by mouth once a day  -- Indication: For SUPPLEMENT I will START or STAY ON the medications listed below when I get home from the hospital:    Aspirin Enteric Coated 81 mg oral delayed release tablet  -- 1 tab(s) by mouth once a day  -- Indication: For Blood thinner    losartan 50 mg oral tablet  -- 1 tab(s) by mouth once a day  -- Indication: For Essential hypertension    Rapaflo 8 mg oral capsule  -- 1 cap(s) by mouth once a day (in the morning after a meal)  -- Indication: For BPH (benign prostatic hyperplasia)    atorvastatin 20 mg oral tablet  -- 1 tab(s) by mouth once a day (at bedtime)  -- Indication: For CAD    Toprol-XL 25 mg oral tablet, extended release  -- 0.5 tab(s) by mouth once a day     M2B  505D  DC 2p    -- It is very important that you take or use this exactly as directed.  Do not skip doses or discontinue unless directed by your doctor.  May cause drowsiness.  Alcohol may intensify this effect.  Use care when operating dangerous machinery.  Some non-prescription drugs may aggravate your condition.  Read all labels carefully.  If a warning appears, check with your doctor before taking.  Swallow whole.  Do not crush.  Take with food or milk.  This drug may impair the ability to drive or operate machinery.  Use care until you become familiar with its effects.    -- Indication: For Chronic systolic heart failure    Advair Diskus 100 mcg-50 mcg inhalation powder  -- 1 puff(s) inhaled 2 times a day  -- Indication: For ASTHMA    ProAir HFA 90 mcg/inh inhalation aerosol  -- 1 puff(s) inhaled , As Needed  -- Indication: For ASTHMA    furosemide 20 mg oral tablet  -- 1 tab(s) by mouth once a day  -- Indication: For Chronic systolic heart failure    fluticasone 50 mcg/inh nasal spray  -- 1 spray(s) into nose 2 times a day  -- Indication: For NASAL CONGESTION    Synthroid 75 mcg (0.075 mg) oral tablet  -- 1 tab(s) by mouth once a day  -- Indication: For Hypothyroid    methenamine hippurate 1 g oral tablet  -- 1 tab(s) by mouth once a day (at bedtime)  -- Indication: For BPH (benign prostatic hyperplasia)    Centrum Silver oral tablet  -- 1 tab(s) by mouth once a day  -- Indication: For SUPPLEMENT    thiamine 100 mg oral tablet  -- 1 tab(s) by mouth once a day  -- Indication: For SUPPLEMENT I will START or STAY ON the medications listed below when I get home from the hospital:    Aspirin Enteric Coated 81 mg oral delayed release tablet  -- 1 tab(s) by mouth once a day  -- Indication: For Cardio protective    losartan 50 mg oral tablet  -- 1 tab(s) by mouth once a day  -- Indication: For Essential hypertension    Rapaflo 8 mg oral capsule  -- 1 cap(s) by mouth once a day (in the morning after a meal)  -- Indication: For BPH (benign prostatic hyperplasia)    atorvastatin 20 mg oral tablet  -- 1 tab(s) by mouth once a day (at bedtime)  -- Indication: For HLD    Advair Diskus 100 mcg-50 mcg inhalation powder  -- 1 puff(s) inhaled 2 times a day  -- Indication: For ASTHMA    ProAir HFA 90 mcg/inh inhalation aerosol  -- 1 puff(s) inhaled , As Needed  -- Indication: For ASTHMA    furosemide 20 mg oral tablet  -- 1 tab(s) by mouth once a day  -- Indication: For Chronic systolic heart failure    fluticasone 50 mcg/inh nasal spray  -- 1 spray(s) into nose 2 times a day  -- Indication: For NASAL CONGESTION    Synthroid 75 mcg (0.075 mg) oral tablet  -- 1 tab(s) by mouth once a day  -- Indication: For Hypothyroid    methenamine hippurate 1 g oral tablet  -- 1 tab(s) by mouth once a day (at bedtime)  -- Indication: For BPH (benign prostatic hyperplasia)    Centrum Silver oral tablet  -- 1 tab(s) by mouth once a day  -- Indication: For SUPPLEMENT    thiamine 100 mg oral tablet  -- 1 tab(s) by mouth once a day  -- Indication: For SUPPLEMENT I will START or STAY ON the medications listed below when I get home from the hospital:    Aspirin Enteric Coated 81 mg oral delayed release tablet  -- 1 tab(s) by mouth once a day  -- Indication: For Cardio protective    losartan 50 mg oral tablet  -- 1 tab(s) by mouth once a day  -- Indication: For Essential hypertension    Rapaflo 8 mg oral capsule  -- 1 cap(s) by mouth once a day (in the morning after a meal)  -- Indication: For BPH (benign prostatic hyperplasia)    atorvastatin 20 mg oral tablet  -- 1 tab(s) by mouth once a day (at bedtime)  -- Indication: For HLD    Toprol-XL 25 mg oral tablet, extended release  -- 0.5 tab(s) by mouth once a day     Meds to bed 505D  -- It is very important that you take or use this exactly as directed.  Do not skip doses or discontinue unless directed by your doctor.  May cause drowsiness.  Alcohol may intensify this effect.  Use care when operating dangerous machinery.  Some non-prescription drugs may aggravate your condition.  Read all labels carefully.  If a warning appears, check with your doctor before taking.  Swallow whole.  Do not crush.  Take with food or milk.  This drug may impair the ability to drive or operate machinery.  Use care until you become familiar with its effects.    -- Indication: For Chronic systolic heart failure    Advair Diskus 100 mcg-50 mcg inhalation powder  -- 1 puff(s) inhaled 2 times a day  -- Indication: For ASTHMA    ProAir HFA 90 mcg/inh inhalation aerosol  -- 1 puff(s) inhaled , As Needed  -- Indication: For ASTHMA    furosemide 20 mg oral tablet  -- 1 tab(s) by mouth once a day  -- Indication: For Chronic systolic heart failure    fluticasone 50 mcg/inh nasal spray  -- 1 spray(s) into nose 2 times a day  -- Indication: For NASAL CONGESTION    Synthroid 75 mcg (0.075 mg) oral tablet  -- 1 tab(s) by mouth once a day  -- Indication: For Hypothyroid    methenamine hippurate 1 g oral tablet  -- 1 tab(s) by mouth once a day (at bedtime)  -- Indication: For BPH (benign prostatic hyperplasia)    Centrum Silver oral tablet  -- 1 tab(s) by mouth once a day  -- Indication: For SUPPLEMENT    thiamine 100 mg oral tablet  -- 1 tab(s) by mouth once a day  -- Indication: For SUPPLEMENT I will START or STAY ON the medications listed below when I get home from the hospital:    Aspirin Enteric Coated 81 mg oral delayed release tablet  -- 1 tab(s) by mouth once a day  -- Indication: For Cardio protective    losartan 50 mg oral tablet  -- 1 tab(s) by mouth once a day  -- Indication: For Essential hypertension    Rapaflo 8 mg oral capsule  -- 1 cap(s) by mouth once a day (in the morning after a meal)  -- Indication: For BPH (benign prostatic hyperplasia)    Toprol-XL 25 mg oral tablet, extended release  -- 0.5 tab(s) by mouth once a day     Meds to bed 505D  -- It is very important that you take or use this exactly as directed.  Do not skip doses or discontinue unless directed by your doctor.  May cause drowsiness.  Alcohol may intensify this effect.  Use care when operating dangerous machinery.  Some non-prescription drugs may aggravate your condition.  Read all labels carefully.  If a warning appears, check with your doctor before taking.  Swallow whole.  Do not crush.  Take with food or milk.  This drug may impair the ability to drive or operate machinery.  Use care until you become familiar with its effects.    -- Indication: For Chronic systolic heart failure    Advair Diskus 100 mcg-50 mcg inhalation powder  -- 1 puff(s) inhaled 2 times a day  -- Indication: For ASTHMA    ProAir HFA 90 mcg/inh inhalation aerosol  -- 1 puff(s) inhaled , As Needed  -- Indication: For ASTHMA    furosemide 20 mg oral tablet  -- 1 tab(s) by mouth once a day  -- Indication: For Chronic systolic heart failure    fluticasone 50 mcg/inh nasal spray  -- 1 spray(s) into nose 2 times a day  -- Indication: For NASAL CONGESTION    Synthroid 75 mcg (0.075 mg) oral tablet  -- 1 tab(s) by mouth once a day  -- Indication: For Hypothyroid    methenamine hippurate 1 g oral tablet  -- 1 tab(s) by mouth once a day (at bedtime)  -- Indication: For BPH (benign prostatic hyperplasia)    Centrum Silver oral tablet  -- 1 tab(s) by mouth once a day  -- Indication: For SUPPLEMENT    thiamine 100 mg oral tablet  -- 1 tab(s) by mouth once a day  -- Indication: For SUPPLEMENT

## 2018-07-17 NOTE — H&P ADULT - PROBLEM SELECTOR PLAN 2
c/w home medications as previously prescribed. Likely elevated BP today 2/2 not taking his medications yet. Continue to monitor BP

## 2018-07-17 NOTE — H&P ADULT - ASSESSMENT
83M hx of Asthma, HTN, Hypothyroidism, BPH, Prostate CA s/p RT 83M hx of Asthma, HTN, Hypothyroidism, BPH, Prostate CA s/p RT being admitted for abdominal discomfort.

## 2018-07-17 NOTE — DISCHARGE NOTE ADULT - OTHER SIGNIFICANT FINDINGS
82yo M pmh inclusive of htn, prostate s/p RT, bph, asthma, c/o back pain which began last night while sitting on couch, sharp, "between shoulder blades", constant, ?rad to abd today, and now having both back and abd pain.   Epigastric pain  - Non-toxic appearing, pleasant without nausea/vomiting  - CT Abdomen/Pelvis negative for acute intracolonic pathology  - Likely patient with acid reflux/indigestion.  -c/w Maalox and Pepcid for epigastric discomfort   -Angiogram workup was negative for acute vascular issues.     Essential hypertension  -c/w home medications as previously prescribed. Likely elevated BP today 2/2 not taking his medications yet. Continue to monitor BP.     Hypothyroid  -c/w Synthroid as previously prescribed.     BPH  On rapaflo at home, will change to Flomax for hospital stay.     Moderate persistent asthma without complication  -c/w Advair's therapeutic interchange and RENÉ  No respiratory distress or wheezing at this time.     Prophylactic measure  IMPROVE VTE Individual Risk Assessment, Score = 2 c/w HSQ for DVT ppx     7/18 CT pelvis/abd severe narrowing of the left   internal iliac artery, along with narrowing of a right internal iliac     7/18 CATH: EF 30, normal coronary  arteries; RRA access

## 2018-07-17 NOTE — ED PROVIDER NOTE - MEDICAL DECISION MAKING DETAILS
84yo M pmh as above here for back and chest pain, asymmetrical UE BPs at triage, symmetrical pressures, 82yo M pmh as above here for back and chest pain, asymmetrical UE BPs at triage, symmetrical pulses, 84yo M pmh as above here for back and chest pain, asymmetrical UE BPs at triage, symmetrical pulses. Ddx includes aortic dissection vs acs vs pancreatitis vs GERD. Labs, imaging, meds.

## 2018-07-17 NOTE — SBIRT NOTE. - NSSBIRTSERVICES_GEN_A_ED_FT
Provided SBIRT services: Full screen positive. Brief Intervention Performed. Screening results were reviewed with the patient and patient was provided information about healthy guidelines and potential negative consequences associated with level of risk. Motivation and readiness to reduce or stop use was discussed and goals and activities to make changes were suggested/offered.  Audit Score: 9  DAST Score: 0  Duration = 20 Minutes

## 2018-07-17 NOTE — ED ADULT NURSE NOTE - OBJECTIVE STATEMENT
Received on stretcher in 11. Awake, A&Ox3, ambulatory without assistance, NAD observed, respirations even and unlabored on room air. 84 YO male c/o bilateral lower quadrant abdominal pain. Onset "a few hours ago" while pt was asleep, constant since then, located in BL lower quadrants, radiates to upper back and out to shoulders, described as "dull, ache", rated 6/10 on pain scale. Awaiting lab result and CT scans.

## 2018-07-17 NOTE — DISCHARGE NOTE ADULT - CARE PLAN
Principal Discharge DX:	Epigastric pain  Goal:	symptom management  Assessment and plan of treatment:	Angiogram workup was negative for acute vascular issues. CT abdomen was negative. Follow up with GI outpatient and PCP outpatient  Secondary Diagnosis:	Essential hypertension  Goal:	stable  Assessment and plan of treatment:	continue with home blood pressure medications  Secondary Diagnosis:	Moderate persistent asthma without complication  Goal:	stable  Assessment and plan of treatment:	continue with home inhaler  Secondary Diagnosis:	BPH (benign prostatic hyperplasia)  Goal:	stable  Assessment and plan of treatment:	continue with rapaflo  Secondary Diagnosis:	Hypothyroid  Assessment and plan of treatment:	continue with synthroid Principal Discharge DX:	Chronic systolic heart failure  Goal:	To relieve and prevent worsening symptoms associated with congestive heart failure, to improve quality of life, and to treat underlying conditions such as coronary heart disease, high blood pressure, or diabetes, and to maintain euvolemia.  Assessment and plan of treatment:	Low salt diet, fluid restriction to 1500 ml daily, monitor your fluid intake and weight daily, exercise as tolerated 30 minutes daily, and follow up with your physician within 7 days.  start Toprol 12.5 mg daily and follow up with Dr Clemons upon discharge  Secondary Diagnosis:	Essential hypertension  Goal:	To maintain a normal blood pressure to prevent heart attack, stroke and renal failure.  Assessment and plan of treatment:	Low sodium and fat diet, continue anti-hypertensive medications, and follow up with primary care physician.  Secondary Diagnosis:	Moderate persistent asthma without complication  Goal:	To prevent long-term (chronic) symptoms that interfere with daily living, such as coughing, wheezing or shortness of breath during the night or after exercise.  To be able to participate in all activities of daily living, including work, school, and exercise.  To maintain near normal pulmonary function test and prevent asthma exacerbation.  Assessment and plan of treatment:	continue with home inhaler  Secondary Diagnosis:	BPH (benign prostatic hyperplasia)  Goal:	stable  Assessment and plan of treatment:	continue with rapaflo  Secondary Diagnosis:	Hypothyroid  Assessment and plan of treatment:	continue with synthroid Principal Discharge DX:	Chronic systolic heart failure  Goal:	To relieve and prevent worsening symptoms associated with congestive heart failure, to improve quality of life, and to treat underlying conditions such as coronary heart disease, high blood pressure, or diabetes, and to maintain euvolemia.  Assessment and plan of treatment:	Low salt diet, fluid restriction to 1500 ml daily, monitor your fluid intake and weight daily, exercise as tolerated 30 minutes daily, and follow up with your physician within 7 days.  start Toprol 12.5 mg daily and follow up with Dr Clemons upon discharge. You have an appointment with the device clinic on 08/08/2018 at 2pm. Device clinic is located in the ambulatory care building on the fourth floor (oncology building).  Secondary Diagnosis:	Essential hypertension  Goal:	To maintain a normal blood pressure to prevent heart attack, stroke and renal failure.  Assessment and plan of treatment:	Low sodium and fat diet, continue anti-hypertensive medications, and follow up with primary care physician.  Secondary Diagnosis:	Moderate persistent asthma without complication  Goal:	To prevent long-term (chronic) symptoms that interfere with daily living, such as coughing, wheezing or shortness of breath during the night or after exercise.  To be able to participate in all activities of daily living, including work, school, and exercise.  To maintain near normal pulmonary function test and prevent asthma exacerbation.  Assessment and plan of treatment:	continue with home inhaler  Secondary Diagnosis:	BPH (benign prostatic hyperplasia)  Goal:	stable  Assessment and plan of treatment:	continue with rapaflo  Secondary Diagnosis:	Hypothyroid  Assessment and plan of treatment:	continue with synthroid

## 2018-07-17 NOTE — ED PROVIDER NOTE - PROGRESS NOTE DETAILS
FAM Abdi: pt had 1 episode of NB/NB. Discussed the results of the labs/imaging with the patient. Will admit the patient for ACS r/o. Spoke to the  from Dr. Samuel's office. Awaiting call back. FAM Abdi: spoke with Dr. Samuel - would like to admit to hosptialist for acs r/o.

## 2018-07-17 NOTE — DISCHARGE NOTE ADULT - CARE PROVIDER_API CALL
Liu Clemons), Cardiovascular Disease; Internal Medicine  [address] [facility]  [place] [address]  Phone: [phone]  Fax: [phone]    Franchesca Richardson), Cardiac Electrophysiology; Cardiology; Internal Medicine  [id number] [place] [id number]  Phone: [phone]  Fax: [phone]    Roe Samuel), Gastroenterology; Internal Medicine  [address] [facility] [address]  [place] [address]  Phone: [phone]  Fax: [phone]

## 2018-07-17 NOTE — ED PROVIDER NOTE - PHYSICAL EXAMINATION
Gen: Alert, mild distress  Head: NC, AT,  EOMI, normal lids/conjunctiva  ENT:  normal hearing, patent oropharynx without erythema/exudate  Neck: +supple, no tenderness/meningismus/JVD, +Trachea midline  Chest: no chest wall tenderness, equal chest rise  Pulm: Bilateral BS, normal resp effort, no wheeze/stridor/retractions  CV: RRR, no M/R/G, +dist pulses  Abd: +BS, soft, ND, ttp in upper abd in band-like fashion, greatest in epigastrium  Rectal: deferred  Mskel: no edema/erythema/cyanosis  Skin: no rash  Neuro: AAOx3, no sensory/motor deficits, CN 2-12 intact

## 2018-07-17 NOTE — DISCHARGE NOTE ADULT - MEDICATION SUMMARY - MEDICATIONS TO STOP TAKING
I will STOP taking the medications listed below when I get home from the hospital:    Plavix 75 mg oral tablet  -- 1 tab(s) by mouth once on sunday & wednesday. I will STOP taking the medications listed below when I get home from the hospital:  None

## 2018-07-17 NOTE — H&P ADULT - NSHPPHYSICALEXAM_GEN_ALL_CORE
Vital Signs Last 24 Hrs  T(C): 36.4 (17 Jul 2018 08:00), Max: 36.4 (17 Jul 2018 06:48)  T(F): 97.6 (17 Jul 2018 08:00), Max: 97.6 (17 Jul 2018 06:48)  HR: 75 (17 Jul 2018 08:24) (72 - 89)  BP: 186/97 (17 Jul 2018 08:24) (156/97 - 195/95)  BP(mean): 134 (17 Jul 2018 08:00) (134 - 134)  RR: 16 (17 Jul 2018 08:24) (15 - 18)  SpO2: 98% (17 Jul 2018 08:24) (96% - 98%)    General: NAD, non-toxic appearing, speaking in full sentences   HEENT: EOMI, PERRLA, no conjunctival pallor, MMM, no JVD, no thyromegaly, neck supple, trachea midline  CV: S1S2 RRR no MRG  Lungs: CTA BL  Abdomen: soft NTND +BS   Extremities: No CCE +WWP  Skin/MSK: No rashes, preserved ROM on active & passive movement  Neuro: AAOx3, no focal deficits (5/5 strength all extremities), no sensory deficits

## 2018-07-18 DIAGNOSIS — R74.8 ABNORMAL LEVELS OF OTHER SERUM ENZYMES: ICD-10-CM

## 2018-07-18 LAB
ALBUMIN SERPL ELPH-MCNC: 3.2 G/DL — LOW (ref 3.3–5)
ALP SERPL-CCNC: 100 U/L — SIGNIFICANT CHANGE UP (ref 40–120)
ALT FLD-CCNC: 15 U/L — SIGNIFICANT CHANGE UP (ref 4–41)
AST SERPL-CCNC: 25 U/L — SIGNIFICANT CHANGE UP (ref 4–40)
BASOPHILS # BLD AUTO: 0.03 K/UL — SIGNIFICANT CHANGE UP (ref 0–0.2)
BASOPHILS NFR BLD AUTO: 0.3 % — SIGNIFICANT CHANGE UP (ref 0–2)
BILIRUB SERPL-MCNC: 1 MG/DL — SIGNIFICANT CHANGE UP (ref 0.2–1.2)
BUN SERPL-MCNC: 10 MG/DL — SIGNIFICANT CHANGE UP (ref 7–23)
CALCIUM SERPL-MCNC: 8.6 MG/DL — SIGNIFICANT CHANGE UP (ref 8.4–10.5)
CHLORIDE SERPL-SCNC: 96 MMOL/L — LOW (ref 98–107)
CK SERPL-CCNC: 92 U/L — SIGNIFICANT CHANGE UP (ref 30–200)
CO2 SERPL-SCNC: 20 MMOL/L — LOW (ref 22–31)
CREAT SERPL-MCNC: 0.62 MG/DL — SIGNIFICANT CHANGE UP (ref 0.5–1.3)
EOSINOPHIL # BLD AUTO: 0.07 K/UL — SIGNIFICANT CHANGE UP (ref 0–0.5)
EOSINOPHIL NFR BLD AUTO: 0.7 % — SIGNIFICANT CHANGE UP (ref 0–6)
GLUCOSE SERPL-MCNC: 89 MG/DL — SIGNIFICANT CHANGE UP (ref 70–99)
HCT VFR BLD CALC: 40.7 % — SIGNIFICANT CHANGE UP (ref 39–50)
HGB BLD-MCNC: 13.9 G/DL — SIGNIFICANT CHANGE UP (ref 13–17)
IMM GRANULOCYTES # BLD AUTO: 0.04 # — SIGNIFICANT CHANGE UP
IMM GRANULOCYTES NFR BLD AUTO: 0.4 % — SIGNIFICANT CHANGE UP (ref 0–1.5)
LYMPHOCYTES # BLD AUTO: 0.89 K/UL — LOW (ref 1–3.3)
LYMPHOCYTES # BLD AUTO: 9.5 % — LOW (ref 13–44)
MCHC RBC-ENTMCNC: 31.8 PG — SIGNIFICANT CHANGE UP (ref 27–34)
MCHC RBC-ENTMCNC: 34.2 % — SIGNIFICANT CHANGE UP (ref 32–36)
MCV RBC AUTO: 93.1 FL — SIGNIFICANT CHANGE UP (ref 80–100)
MONOCYTES # BLD AUTO: 0.85 K/UL — SIGNIFICANT CHANGE UP (ref 0–0.9)
MONOCYTES NFR BLD AUTO: 9.1 % — SIGNIFICANT CHANGE UP (ref 2–14)
NEUTROPHILS # BLD AUTO: 7.5 K/UL — HIGH (ref 1.8–7.4)
NEUTROPHILS NFR BLD AUTO: 80 % — HIGH (ref 43–77)
NRBC # FLD: 0 — SIGNIFICANT CHANGE UP
PLATELET # BLD AUTO: 228 K/UL — SIGNIFICANT CHANGE UP (ref 150–400)
PMV BLD: 9.9 FL — SIGNIFICANT CHANGE UP (ref 7–13)
POTASSIUM SERPL-MCNC: 4.1 MMOL/L — SIGNIFICANT CHANGE UP (ref 3.5–5.3)
POTASSIUM SERPL-SCNC: 4.1 MMOL/L — SIGNIFICANT CHANGE UP (ref 3.5–5.3)
PROT SERPL-MCNC: 6.2 G/DL — SIGNIFICANT CHANGE UP (ref 6–8.3)
RBC # BLD: 4.37 M/UL — SIGNIFICANT CHANGE UP (ref 4.2–5.8)
RBC # FLD: 12.2 % — SIGNIFICANT CHANGE UP (ref 10.3–14.5)
SODIUM SERPL-SCNC: 130 MMOL/L — LOW (ref 135–145)
TROPONIN T, HIGH SENSITIVITY: 82 NG/L — CRITICAL HIGH (ref ?–14)
TSH SERPL-MCNC: 2.92 UIU/ML — SIGNIFICANT CHANGE UP (ref 0.27–4.2)
WBC # BLD: 9.38 K/UL — SIGNIFICANT CHANGE UP (ref 3.8–10.5)
WBC # FLD AUTO: 9.38 K/UL — SIGNIFICANT CHANGE UP (ref 3.8–10.5)

## 2018-07-18 PROCEDURE — 93458 L HRT ARTERY/VENTRICLE ANGIO: CPT | Mod: 26

## 2018-07-18 PROCEDURE — 99233 SBSQ HOSP IP/OBS HIGH 50: CPT

## 2018-07-18 RX ORDER — ASPIRIN/CALCIUM CARB/MAGNESIUM 324 MG
243 TABLET ORAL ONCE
Qty: 0 | Refills: 0 | Status: COMPLETED | OUTPATIENT
Start: 2018-07-18 | End: 2018-07-18

## 2018-07-18 RX ORDER — CLOPIDOGREL BISULFATE 75 MG/1
525 TABLET, FILM COATED ORAL ONCE
Qty: 0 | Refills: 0 | Status: COMPLETED | OUTPATIENT
Start: 2018-07-18 | End: 2018-07-18

## 2018-07-18 RX ADMIN — HEPARIN SODIUM 5000 UNIT(S): 5000 INJECTION INTRAVENOUS; SUBCUTANEOUS at 05:32

## 2018-07-18 RX ADMIN — BUDESONIDE AND FORMOTEROL FUMARATE DIHYDRATE 2 PUFF(S): 160; 4.5 AEROSOL RESPIRATORY (INHALATION) at 09:06

## 2018-07-18 RX ADMIN — Medication 81 MILLIGRAM(S): at 13:07

## 2018-07-18 RX ADMIN — Medication 75 MICROGRAM(S): at 05:32

## 2018-07-18 RX ADMIN — Medication 243 MILLIGRAM(S): at 14:53

## 2018-07-18 RX ADMIN — HEPARIN SODIUM 5000 UNIT(S): 5000 INJECTION INTRAVENOUS; SUBCUTANEOUS at 13:08

## 2018-07-18 RX ADMIN — CLOPIDOGREL BISULFATE 75 MILLIGRAM(S): 75 TABLET, FILM COATED ORAL at 13:07

## 2018-07-18 RX ADMIN — PANTOPRAZOLE SODIUM 40 MILLIGRAM(S): 20 TABLET, DELAYED RELEASE ORAL at 05:32

## 2018-07-18 RX ADMIN — BUDESONIDE AND FORMOTEROL FUMARATE DIHYDRATE 2 PUFF(S): 160; 4.5 AEROSOL RESPIRATORY (INHALATION) at 21:43

## 2018-07-18 RX ADMIN — CLOPIDOGREL BISULFATE 525 MILLIGRAM(S): 75 TABLET, FILM COATED ORAL at 14:54

## 2018-07-18 RX ADMIN — HEPARIN SODIUM 5000 UNIT(S): 5000 INJECTION INTRAVENOUS; SUBCUTANEOUS at 21:13

## 2018-07-18 RX ADMIN — LOSARTAN POTASSIUM 50 MILLIGRAM(S): 100 TABLET, FILM COATED ORAL at 05:32

## 2018-07-18 NOTE — CHART NOTE - NSCHARTNOTEFT_GEN_A_CORE
83yMale s/p cardiac cath today for right radial site check. Pt without any complaints at this time.    Right radial site: Dressing in place c/d/i. No hematoma. No swelling/edema/discoloration/coldness of extremity. Pulses and sensation intact. Cap refill <3 secs.     Will cont to monitor.

## 2018-07-18 NOTE — CHART NOTE - NSCHARTNOTEFT_GEN_A_CORE
HS Trop 82, ck 92.  Pending TTE.  Discussed w/ Cards, Dr. Clemons who will see pt.    Of note, pt remained stable w/o visible signs of distress.  Will get ECG to eval for ST elevation or interval changes. HS Trop 82, ck 92.  Pending TTE.  Discussed w/ Cards, Dr. Clemons who will see pt.    Of note, pt remained stable w/o visible signs of distress.  Will get ECG to eval for ST elevation or interval changes.    Update:  14:45-  Pt has been eval by cards, plan for cath today.  Loading dose plavix and ASA ordered.  Discussed w/ RN.  No indication for repeat ECG and hold off Tele for now per cards.  Pt aware of plan for cath.

## 2018-07-18 NOTE — PROGRESS NOTE ADULT - PROBLEM SELECTOR PLAN 1
troponin 3rd set 82  case d/w cardiology  plan for cardiac cath today   pt started on asa and plavix   pt might need telemonitoring post cath

## 2018-07-18 NOTE — CONSULT NOTE ADULT - ASSESSMENT
NSTEMI  asa, plavix  cath today   echo    abnormal EKG  may have underlying myopathy   echo, cath today     hyponatremia  ? diet related   check TSH  consider renal eval

## 2018-07-18 NOTE — CHART NOTE - NSCHARTNOTEFT_GEN_A_CORE
chart reviewed, patient noted to be on Plavix only twice a week at home, patient denies history of GI bleed/blood transfusions and said he only takes it that way as "that is how my doctor prescribes it". Patient made aware of need for daily dual antiplatelet therapy if coronary stent placement needed.

## 2018-07-18 NOTE — CONSULT NOTE ADULT - SUBJECTIVE AND OBJECTIVE BOX
CHIEF COMPLAINT:Patient is a 83y old  Male who presents with a chief complaint of Abdominal Discomfort (17 Jul 2018 14:47)      HISTORY OF PRESENT ILLNESS:  This is a pleasant gentleman with history as below admitted with abdominal discomfort now with elevated trop  no chest pain   no sob     PAST MEDICAL & SURGICAL HISTORY:  Hypothyroid  BPH (benign prostatic hypertrophy) with urinary retention  Prostate Cancer: s/p RT  Asthma  Hypertension  2006 turp  Adrenal Gland Cyst  CVA (Cerebral Infarction): pt unsure if it was qa stroke or not lost rt peripheral vision which returned  LEFT CATARACT 5/11/10  Bilat Ing Hernia: 2009          MEDICATIONS:  aspirin  chewable 243 milliGRAM(s) Oral once  aspirin enteric coated 81 milliGRAM(s) Oral daily  clopidogrel Tablet 75 milliGRAM(s) Oral <User Schedule>  clopidogrel Tablet 525 milliGRAM(s) Oral once  heparin  Injectable 5000 Unit(s) SubCutaneous every 8 hours  losartan 50 milliGRAM(s) Oral daily      ALBUTerol    90 MICROgram(s) HFA Inhaler 2 Puff(s) Inhalation every 4 hours PRN  buDESOnide 160 MICROgram(s)/formoterol 4.5 MICROgram(s) Inhaler 2 Puff(s) Inhalation two times a day    acetaminophen   Tablet. 650 milliGRAM(s) Oral every 6 hours PRN    aluminum hydroxide/magnesium hydroxide/simethicone Suspension 30 milliLiter(s) Oral every 4 hours PRN  pantoprazole    Tablet 40 milliGRAM(s) Oral before breakfast    levothyroxine 75 MICROGram(s) Oral daily        FAMILY HISTORY:  No pertinent family history in first degree relatives      Non-contributory    SOCIAL HISTORY:    No tobacco, drugs or etoh    Allergies    No Known Allergies    Intolerances    	    REVIEW OF SYSTEMS:  as above  The rest of the 14 points ROS reviewed and except above they are unremarkable.        PHYSICAL EXAM:  T(C): 36.9 (07-18-18 @ 12:37), Max: 36.9 (07-17-18 @ 22:36)  HR: 61 (07-18-18 @ 12:37) (61 - 78)  BP: 115/62 (07-18-18 @ 12:37) (115/62 - 147/79)  RR: 18 (07-18-18 @ 12:37) (18 - 18)  SpO2: 100% (07-18-18 @ 12:37) (99% - 100%)  Wt(kg): --  I&O's Summary      JVP: Normal  Neck: supple  Lung: clear   CV: S1 S2 , Murmur:  Abd: soft  Ext: No edema  neuro: Awake / alert  Psych: flat affect  Skin: normal     LABS/DATA:    TELEMETRY: 	    ECG:  	sinus, IVCD, LAD   	  CARDIAC MARKERS:                                      13.9   9.38  )-----------( 228      ( 18 Jul 2018 08:09 )             40.7     07-18    130<L>  |  96<L>  |  10  ----------------------------<  89  4.1   |  20<L>  |  0.62    Ca    8.6      18 Jul 2018 08:09    TPro  6.2  /  Alb  3.2<L>  /  TBili  1.0  /  DBili  x   /  AST  25  /  ALT  15  /  AlkPhos  100  07-18    proBNP:   Lipid Profile:   HgA1c:   TSH:

## 2018-07-18 NOTE — PROGRESS NOTE ADULT - SUBJECTIVE AND OBJECTIVE BOX
Patient is a 83y old  Male who presents with a chief complaint of Abdominal Discomfort (17 Jul 2018 14:47)      SUBJECTIVE / OVERNIGHT EVENTS: patient seen and examined by bedside at 10 Am, denies headache, dizziness, SOB, CP, Palpitations , N/V/D  Abdominal pain resolved   Patient wants to go home as he was told by providers last night he can go home next morning  Explained to patient that his troponin were elevated, will repeat another set and get cardiology opinion prior to discharge. Patient agreed     MEDICATIONS  (STANDING):  aspirin enteric coated 81 milliGRAM(s) Oral daily  buDESOnide 160 MICROgram(s)/formoterol 4.5 MICROgram(s) Inhaler 2 Puff(s) Inhalation two times a day  clopidogrel Tablet 75 milliGRAM(s) Oral <User Schedule>  heparin  Injectable 5000 Unit(s) SubCutaneous every 8 hours  levothyroxine 75 MICROGram(s) Oral daily  losartan 50 milliGRAM(s) Oral daily  pantoprazole    Tablet 40 milliGRAM(s) Oral before breakfast    MEDICATIONS  (PRN):  acetaminophen   Tablet. 650 milliGRAM(s) Oral every 6 hours PRN mild and moderate pain  ALBUTerol    90 MICROgram(s) HFA Inhaler 2 Puff(s) Inhalation every 4 hours PRN Shortness of Breath and/or Wheezing  aluminum hydroxide/magnesium hydroxide/simethicone Suspension 30 milliLiter(s) Oral every 4 hours PRN Dyspepsia      Vital Signs Last 24 Hrs  T(C): 36.9 (18 Jul 2018 12:37), Max: 36.9 (17 Jul 2018 22:36)  T(F): 98.4 (18 Jul 2018 12:37), Max: 98.4 (17 Jul 2018 22:36)  HR: 61 (18 Jul 2018 12:37) (61 - 78)  BP: 115/62 (18 Jul 2018 12:37) (115/62 - 147/79)  BP(mean): --  RR: 18 (18 Jul 2018 12:37) (18 - 18)  SpO2: 100% (18 Jul 2018 12:37) (99% - 100%)  CAPILLARY BLOOD GLUCOSE        I&O's Summary      PHYSICAL EXAM:  GENERAL: NAD, well-developed  HEAD:  Atraumatic, Normocephalic  EYES: EOMI, PERRLA, conjunctiva and sclera clear  NECK: Supple,   CHEST/LUNG: Clear to auscultation bilaterally; No wheeze  HEART: Regular rate and rhythm;   ABDOMEN: Soft, Nontender, Nondistended; Bowel sounds present  EXTREMITIES:  2+ Peripheral Pulses, No clubbing, cyanosis, or edema  PSYCH: AAOx3  NEUROLOGY: non-focal  SKIN: No rashes or lesions    LABS:                        13.9   9.38  )-----------( 228      ( 18 Jul 2018 08:09 )             40.7     07-18    130<L>  |  96<L>  |  10  ----------------------------<  89  4.1   |  20<L>  |  0.62    Ca    8.6      18 Jul 2018 08:09    TPro  6.2  /  Alb  3.2<L>  /  TBili  1.0  /  DBili  x   /  AST  25  /  ALT  15  /  AlkPhos  100  07-18    PT/INR - ( 17 Jul 2018 08:58 )   PT: 11.3 SEC;   INR: 1.02          PTT - ( 17 Jul 2018 08:58 )  PTT:37.3 SEC  CARDIAC MARKERS ( 18 Jul 2018 12:15 )  x     / x     / 92 u/L / x     / x              RADIOLOGY & ADDITIONAL TESTS:    Imaging Personally Reviewed:    Consultant(s) Notes Reviewed:      Care Discussed with Consultants/Other Providers: cardiology

## 2018-07-19 DIAGNOSIS — R91.1 SOLITARY PULMONARY NODULE: ICD-10-CM

## 2018-07-19 DIAGNOSIS — E87.1 HYPO-OSMOLALITY AND HYPONATREMIA: ICD-10-CM

## 2018-07-19 LAB
BUN SERPL-MCNC: 12 MG/DL — SIGNIFICANT CHANGE UP (ref 7–23)
CALCIUM SERPL-MCNC: 8.7 MG/DL — SIGNIFICANT CHANGE UP (ref 8.4–10.5)
CHLORIDE SERPL-SCNC: 96 MMOL/L — LOW (ref 98–107)
CO2 SERPL-SCNC: 19 MMOL/L — LOW (ref 22–31)
CREAT SERPL-MCNC: 0.66 MG/DL — SIGNIFICANT CHANGE UP (ref 0.5–1.3)
GLUCOSE SERPL-MCNC: 86 MG/DL — SIGNIFICANT CHANGE UP (ref 70–99)
HCT VFR BLD CALC: 43 % — SIGNIFICANT CHANGE UP (ref 39–50)
HGB BLD-MCNC: 14.6 G/DL — SIGNIFICANT CHANGE UP (ref 13–17)
MCHC RBC-ENTMCNC: 32 PG — SIGNIFICANT CHANGE UP (ref 27–34)
MCHC RBC-ENTMCNC: 34 % — SIGNIFICANT CHANGE UP (ref 32–36)
MCV RBC AUTO: 94.3 FL — SIGNIFICANT CHANGE UP (ref 80–100)
NRBC # FLD: 0 — SIGNIFICANT CHANGE UP
PLATELET # BLD AUTO: 243 K/UL — SIGNIFICANT CHANGE UP (ref 150–400)
PMV BLD: 10.3 FL — SIGNIFICANT CHANGE UP (ref 7–13)
POTASSIUM SERPL-MCNC: 4.1 MMOL/L — SIGNIFICANT CHANGE UP (ref 3.5–5.3)
POTASSIUM SERPL-SCNC: 4.1 MMOL/L — SIGNIFICANT CHANGE UP (ref 3.5–5.3)
RBC # BLD: 4.56 M/UL — SIGNIFICANT CHANGE UP (ref 4.2–5.8)
RBC # FLD: 12.5 % — SIGNIFICANT CHANGE UP (ref 10.3–14.5)
SODIUM SERPL-SCNC: 131 MMOL/L — LOW (ref 135–145)
WBC # BLD: 5.86 K/UL — SIGNIFICANT CHANGE UP (ref 3.8–10.5)
WBC # FLD AUTO: 5.86 K/UL — SIGNIFICANT CHANGE UP (ref 3.8–10.5)

## 2018-07-19 PROCEDURE — 93306 TTE W/DOPPLER COMPLETE: CPT | Mod: 26

## 2018-07-19 PROCEDURE — 99233 SBSQ HOSP IP/OBS HIGH 50: CPT

## 2018-07-19 RX ORDER — FUROSEMIDE 40 MG
20 TABLET ORAL DAILY
Qty: 0 | Refills: 0 | Status: DISCONTINUED | OUTPATIENT
Start: 2018-07-19 | End: 2018-07-24

## 2018-07-19 RX ADMIN — ALBUTEROL 2 PUFF(S): 90 AEROSOL, METERED ORAL at 20:42

## 2018-07-19 RX ADMIN — HEPARIN SODIUM 5000 UNIT(S): 5000 INJECTION INTRAVENOUS; SUBCUTANEOUS at 05:24

## 2018-07-19 RX ADMIN — Medication 20 MILLIGRAM(S): at 17:57

## 2018-07-19 RX ADMIN — BUDESONIDE AND FORMOTEROL FUMARATE DIHYDRATE 2 PUFF(S): 160; 4.5 AEROSOL RESPIRATORY (INHALATION) at 08:53

## 2018-07-19 RX ADMIN — Medication 650 MILLIGRAM(S): at 10:00

## 2018-07-19 RX ADMIN — PANTOPRAZOLE SODIUM 40 MILLIGRAM(S): 20 TABLET, DELAYED RELEASE ORAL at 05:24

## 2018-07-19 RX ADMIN — Medication 650 MILLIGRAM(S): at 08:54

## 2018-07-19 RX ADMIN — Medication 75 MICROGRAM(S): at 05:24

## 2018-07-19 RX ADMIN — HEPARIN SODIUM 5000 UNIT(S): 5000 INJECTION INTRAVENOUS; SUBCUTANEOUS at 21:37

## 2018-07-19 RX ADMIN — Medication 81 MILLIGRAM(S): at 12:00

## 2018-07-19 RX ADMIN — HEPARIN SODIUM 5000 UNIT(S): 5000 INJECTION INTRAVENOUS; SUBCUTANEOUS at 14:27

## 2018-07-19 RX ADMIN — LOSARTAN POTASSIUM 50 MILLIGRAM(S): 100 TABLET, FILM COATED ORAL at 05:24

## 2018-07-19 NOTE — PROGRESS NOTE ADULT - PROBLEM SELECTOR PLAN 1
Jo Ann Sosa  : 1945 [facility]  [address] [place] [address]  [phone]   [phone]       OUTPATIENT PHYSICAL THERAPY:Initial Assessment 2017    ICD-10: Treatment Diagnosis: Ankylosing Spondylitis of thoracic region (M45.4); Pain in thoracic spine (M54.5); muscle weakness, generalized (M62.81); difficulty walking, not elsewhere classified (R26.2)  Precautions/Allergies:  Codeine   Fall Risk Score: 2 (? 5 = High Risk)  MD Orders: Eval and treat MEDICAL/REFERRING DIAGNOSIS: Thoracic back pain; post pneumonia with debility  DATE OF ONSET: 17  REFERRING PHYSICIAN: Dillan Ornelas MD  RETURN PHYSICIAN APPOINTMENT: as needed     INITIAL ASSESSMENT ([id number]):  Mr. Ingrid Xie presents with general debility with overall strength and functional mobility deficits due to prolonged hospitalization due to pneumonia/sepsis; he also complains of thoracic back pain exacerbated due to decreased activity with decreased mobility and strength through the trunk due to worsening ankylosing spondylitis. PROBLEM LIST (Impacting functional limitations): Increased pain through thoracic spine/costal cage limiting tolerance of ADLs and difficulty sleeping  Decreased activity tolerance for basic and instrumental ADLs due to generalized weakness B LE/UE.   Decreased ADL/functional activities specificially with any endurance activity related to decreased overall strength and limited pulmonary capacity post-pneumonia  Outcome measure score of 23 seconds (WNL - 7 seconds) on Timed Up and Go (TUG) test with severe effect of decreased strength/endurance on patient's ability to manage every day life activities  Decreased strength through B LE/UE/trunk limiting all functional mobility INTERVENTIONS PLANNED:  Patient education including pathophysiology of activity tolerance and progression  Manual therapy including soft tissue mobilizations, functional joint mobilizations and neuromuscular re-education to thoracic region  Neuromuscular re-education with focus on initiation/strength and endurance and motor control of B UE/LE/trunk  Therapeutic exercises including strengthening and endurance related tasks  Gait training with focus on correcting deficits, sequencing and honorio  Balance exercise - focused on standing dynamic balance with greatest focus on dynamic center of gravity control  Therapeutic activities with focus on strength and endurance  Therapeutic modalities including pain modalities for thoracic spine  Instructions of home exercise program (HEP)   TREATMENT PLAN:  Effective Dates: 06/07/17 TO 09/07/17  Frequency/Duration: 3 times a week for 6 weeks followed by 1 time a week for 6 weeks  GOALS: (Goals have been discussed and agreed upon with patient.)  Short-Term Functional Goals: Time Frame: 3 weeks  1. Patient will be able to ambulate greater than 250 ft to get to therapy clinic without the use of a wheelchair. 2. Patient will be able to perform walking on treadmill at home for 5 minutes without O2 saturations below 86%. 3. Patient will be independent with sit-stand without requiring hand support/assistance. Discharge Goals: Time Frame: 12 weeks  1. Patient will be independent will all functional mobility at home without difficulty or thoracic back pain. 2. Patient will be able to walk 10,000 steps without difficulty. 3. Patient will have achieved prior level of function for all ADLs without difficulty or thoracic back pain. 4. Patient will score less than 12 seconds on TUG and be WNLs  Rehabilitation Potential For Stated Goals: Excellent  Regarding Della Philippery's therapy, I certify that the treatment plan above will be carried out by a therapist or under their direction.   Thank you for this referral,  Romelia Solis PT     Referring Physician Signature: Kirstie Bernal., MD              Date                    The information in this section was collected on 06/07/17 (except where otherwise noted). HISTORY:   History of Present Injury/Illness (Reason for Referral):  Patient reports he was not feeling well from about 05/13-15/17 and was then admitted to hospital for pneumonia/sepsis with a 3-day hospital stay in ICU with complications that included a L lower lobe pneumonia. He was discharged to home on 05/27/17 and referred to outpatient physical therapy for post-hospital physical therapy. Past Medical History/Comorbidities:   Mr. Lexie Guevara  has a past medical history of Arthritis; Elevated prostate specific antigen (PSA); History of prostate surgery (3/12/2015); HLA-B27 positive; Neck pain; Osteoarthritis, hand; Osteopenia; Osteoporosis; Polyarthritis; Polymyalgia rheumatica ([address] Prostate cancer ([address] Raynaud's disease; Spondylarthritis ([address] Ankylosing Spondylitis and thoracic spine pain. Mr. Lexie Guevara  has a past surgical history that includes vasectomy (40+ yrs); knee arthroscopy (2009); urological; biopsy prostate; prostatectomy (2012); cataract removal (2012); cataract removal (2013); and colonoscopy (02/2010). Social History/Living Environment: Patient lives with spouse. Patient denies and physical barriers at home and has a tub/shower combo. Prior Level of Function/Work/Activity: Patient is retired. Prior to hospitalization, patient reports he walked 5 days/week for 30-50 minutes. .  Dominant Side:  RIGHT  Current Medications:     Current Outpatient Prescriptions:     aspirin 81 mg chewable tablet, Take 1 Tab by mouth daily. , Disp: 30 Tab, Rfl: 1    ferrous sulfate 325 mg (65 mg iron) tablet, Take 1 Tab by mouth two (2) times daily (with meals). , Disp: 60 Tab, Rfl: 1    predniSONE (DELTASONE) 20 mg tablet, Take 2 tablets daily for 1 week then 1 tablet daily until seen in the office for follow up, Disp: 40 Tab, Rfl: 0    potassium chloride (K-DUR, KLOR-CON) 20 mEq tablet, Take 2 Tabs by mouth daily. , Disp: 30 Tab, Rfl: 1    DULoxetine (CYMBALTA) 30 mg capsule, Take 1 Cap by mouth daily. , Disp: 90 Cap, Rfl: 1    traMADol (ULTRAM) 50 mg tablet, Take 1 Tab by mouth three (3) times daily as needed for Pain. Max Daily Amount: 150 mg., Disp: 90 Tab, Rfl: 2    omeprazole (PRILOSEC) 40 mg capsule, Take 1 Cap by mouth daily. , Disp: 30 Cap, Rfl: 5    diclofenac (VOLTAREN) 1 % topical gel, Apply 4 g to affected area four (4) times daily. , Disp: 100 g, Rfl: 2    calcium-vitamin D (OYSTER SHELL) 500 mg(1,250mg) -200 unit per tablet, Take 1 Tab by mouth daily. , Disp: , Rfl:     cholecalciferol, vitamin D3, (VITAMIN D3) 2,000 unit Tab, Take  by mouth. Taking 1 1/2 tabs of 2000 international units once daily, Disp: , Rfl:    Date Last Reviewed:  6/8/2017   Number of Personal Factors/Comorbidities that affect the Plan of Care: 1-2: MODERATE COMPLEXITY   EXAMINATION:   Observation/Orthostatic Postural Assessment: Bp - 88/62 (sitting); HR - 80; Os sats - 91% on room air. Cachetic body with significant weight loss from hospitalization. Posture - kyphotic with an anterior rotated rib cage that is posterior positioned over a posterior tilted pelvis. Atrophy noted through all LE and UE musculature, most notable gluteals. ROM:  AROM B UE WNL with shoulder abduction palm down 140 degrees B. Cervical rotation 70% B and pain free. B LE AROM WNL. Passive trunk rotation less than 50% B. Breathing assessment indicated decreased mobility of L lower rib cage with inhalation. Strength:  B UE grossly good except 4+/5 for R shoulder ER and B abduction. B LE grossly fair with 4+/5 for gluts, quadriceps, hamstrings and hip flexors. Neurological Screen:        Myotomes:  Good except for strength deficits noted above. Dermatomes: WNL        Reflexes:  2+ and WNL B UE/LE  Functional Mobility:         Gait/Ambulation:  Independent with slowed honorio with narrowed stance and limited arm swing B.  Endurance with gait - able to tolerate 2 minutes on treadmill before O2 saturation significantly drops to below 86%        Transfers:  Hand assist for sit-stand and min A for floor-stand        Bed Mobility:  Independent but slowed movement due to thoracic back pain        Stairs:  2-hand assist with 2 ft/step  Balance:          Static standing B LE greater than 60 sec; single leg less than 10 sec B. Mental Status:          Alert and oriented x 4   Body Structures Involved:  1. Thoracic Cage  2. Joints  3. Muscles Body Functions Affected:  1. Mental  2. Sensory/Pain  3. Cardio  4. Neuromusculoskeletal  5. Movement Related Activities and Participation Affected:  1. Mobility  2. Self Care  3. Domestic Life  4. Community, Social and [place]   Number of elements (examined above) that affect the Plan of Care: 4+: HIGH COMPLEXITY   CLINICAL PRESENTATION:   Presentation: Evolving clinical presentation with changing clinical characteristics: MODERATE COMPLEXITY   CLINICAL DECISION MAKING:   Outcome Measure: Tool Used: Modified Oswestry Low Back Pain Questionnaire  Score:  Initial: 22/50 (06/07/17) Most Recent: X/50 (Date: -- )   Interpretation of Score: Each section is scored on a 0-5 scale, 5 representing the greatest disability. The scores of each section are added together for a total score of 50. Score 0 1-10 11-20 21-30 31-40 41-49 50   Modifier CH CI CJ CK CL CM CN     Tool Used: Timed Up and Go (TUG)  Score:  Initial: 23 seconds (06/07/17) Most Recent: X seconds (Date: -- )   Interpretation of Score: The test measures, in seconds, the time taken by an individual to stand up from a standard arm chair (seat height 46 cm [18 in], arm height 65 cm [25.6 in]), walk a distance of 3 meters (118 in, approx 10 ft), turn, walk back to the chair and sit down. If the individual takes longer than 14 seconds to complete TUG, this indicates risk for falls. Score 7 7.5-10.5 11-14 14.5-17.5 18-21 21.5-24.5 25+   Modifier CH CI CJ CK CL CM CN     ?  Mobility - Walking and Moving Around:  - CURRENT STATUS: CM - 80%-99% impaired, limited or restricted    - GOAL STATUS: CI - 1%-19% impaired, limited or restricted    - D/C STATUS: ---------------To be determined---------------    Medical Necessity:   · Patient is expected to demonstrate progress in strength, balance and endurance and functional mobility to increase independence with ADLs, improve safety during daily activities and return to prior functional level. · Patient demonstrates excellent rehab potential due to higher previous functional level. Reason for Services/Other Comments:  · Patient continues to require skilled intervention due to general debility with overall strength and functional mobility deficits due to prolonged hospitalization due to pneumonia/sepsis; he also complains of thoracic back pain exacerbated due to decreased activity with decreased mobility and strength through the trunk due to worsening ankylosing spondylitis. Use of outcome tool(s) and clinical judgement create a POC that gives a: Questionable prediction of patient's progress: MODERATE COMPLEXITY            TREATMENT:   (In addition to Assessment/Re-Assessment sessions the following treatments were rendered)  Pre-treatment Symptoms/Complaints: Patient reports his endurance and overall fatigue are his greatest complaint and inability to perform regular daily tasks without needing assistance of spouse. He reports his thoracic back pain, that is chronic in nature due to ankylosing spondylitis, has worsened due to his inactivity and hospitalization and exacerbated with immobility and alleviated with warm showers and movement. Pain: Initial:   Pain Intensity 1: 8  Pain Location 1: Spine, thoracic  Pain Orientation 1: Posterior, Left  Pain Intervention(s) 1: Heat applied  Post Session:  5/10     Therapeutic Exercise: (10 Minutes):  Exercises per grid below to improve strength and endurance.   Required minimal verbal cues to promote proper body troponin 3rd set 82  case d/w cardiology  plan for cardiac cath today   pt started on asa and plavix   pt might need telemonitoring post cath alignment and promote proper body breathing techniques. Date:  06/07/17 Date:   Date:     Activity/Exercise      Treadmill at home 1. 5mph x 5 min (goal)     Sit-stand  10 x 1                                   Home Exercise Program (HEP): As above; handouts given to patient for all exercises. Treatment/Session Assessment:    · Response to Treatment:  All activity limited by O2 saturations dropping after every 2 minutes of activity. · Compliance with Program/Exercises: Will assess as treatment progresses. · Recommendations/Intent for next treatment session: \"Next visit will focus on advancements to more challenging activities and progressing to more strengthening and endurance activities\".   Total Treatment Duration: (10 minutes of treatment; 50 minutes of evaluation)  PT Patient Time In/Time Out  Time In: 1030  Time Out: 1125    Ja Alberto PT likely secondary to CHF, pt s/p cardiac cath m no CAD but low EF   pt started on asa and plavix   also started on low dose lasix, c/w ARB, holding BB for soft BP  will f/u TTE to r/o valvular abnormality

## 2018-07-19 NOTE — PROGRESS NOTE ADULT - PROBLEM SELECTOR PLAN 6
c/w Advair's therapeutic interchange and RENÉ. No respiratory distress or wheezing at this time RUL 1.1 cm and 0.4 mm nodule noted on CT chest  repeat CT in 3-6 months recommended

## 2018-07-19 NOTE — PROGRESS NOTE ADULT - PROBLEM SELECTOR PLAN 5
On rapaflo at home, will change to Flomax for hospital stay mild   will check serum osm , urine lyte and osm

## 2018-07-19 NOTE — PROGRESS NOTE ADULT - SUBJECTIVE AND OBJECTIVE BOX
Patient is a 83y old  Male who presents with a chief complaint of Abdominal Discomfort (17 Jul 2018 14:47)      SUBJECTIVE / OVERNIGHT EVENTS:    MEDICATIONS  (STANDING):  aspirin enteric coated 81 milliGRAM(s) Oral daily  buDESOnide 160 MICROgram(s)/formoterol 4.5 MICROgram(s) Inhaler 2 Puff(s) Inhalation two times a day  clopidogrel Tablet 75 milliGRAM(s) Oral <User Schedule>  furosemide    Tablet 20 milliGRAM(s) Oral daily  heparin  Injectable 5000 Unit(s) SubCutaneous every 8 hours  levothyroxine 75 MICROGram(s) Oral daily  losartan 50 milliGRAM(s) Oral daily  pantoprazole    Tablet 40 milliGRAM(s) Oral before breakfast    MEDICATIONS  (PRN):  acetaminophen   Tablet. 650 milliGRAM(s) Oral every 6 hours PRN mild and moderate pain  ALBUTerol    90 MICROgram(s) HFA Inhaler 2 Puff(s) Inhalation every 4 hours PRN Shortness of Breath and/or Wheezing  aluminum hydroxide/magnesium hydroxide/simethicone Suspension 30 milliLiter(s) Oral every 4 hours PRN Dyspepsia      Vital Signs Last 24 Hrs  T(C): 36.2 (19 Jul 2018 13:00), Max: 36.5 (19 Jul 2018 05:23)  T(F): 97.2 (19 Jul 2018 13:00), Max: 97.7 (19 Jul 2018 05:23)  HR: 70 (19 Jul 2018 17:56) (70 - 82)  BP: 112/80 (19 Jul 2018 17:56) (100/68 - 124/84)  BP(mean): --  RR: 16 (19 Jul 2018 13:00) (16 - 18)  SpO2: 99% (19 Jul 2018 13:00) (98% - 99%)  CAPILLARY BLOOD GLUCOSE        I&O's Summary    18 Jul 2018 07:01  -  19 Jul 2018 07:00  --------------------------------------------------------  IN: 0 mL / OUT: 300 mL / NET: -300 mL    19 Jul 2018 07:01  -  19 Jul 2018 18:23  --------------------------------------------------------  IN: 560 mL / OUT: 500 mL / NET: 60 mL        PHYSICAL EXAM:  GENERAL: NAD, well-developed  HEAD:  Atraumatic, Normocephalic  EYES: EOMI, PERRLA, conjunctiva and sclera clear  NECK: Supple, No JVD  CHEST/LUNG: Clear to auscultation bilaterally; No wheeze  HEART: Regular rate and rhythm; No murmurs, rubs, or gallops  ABDOMEN: Soft, Nontender, Nondistended; Bowel sounds present  EXTREMITIES:  2+ Peripheral Pulses, No clubbing, cyanosis, or edema  PSYCH: AAOx3  NEUROLOGY: non-focal  SKIN: No rashes or lesions    LABS:                        14.6   5.86  )-----------( 243      ( 19 Jul 2018 07:00 )             43.0     07-19    131<L>  |  96<L>  |  12  ----------------------------<  86  4.1   |  19<L>  |  0.66    Ca    8.7      19 Jul 2018 07:00    TPro  6.2  /  Alb  3.2<L>  /  TBili  1.0  /  DBili  x   /  AST  25  /  ALT  15  /  AlkPhos  100  07-18      CARDIAC MARKERS ( 18 Jul 2018 12:15 )  x     / x     / 92 u/L / x     / x              RADIOLOGY & ADDITIONAL TESTS:    Imaging Personally Reviewed:    Consultant(s) Notes Reviewed:      Care Discussed with Consultants/Other Providers: Patient is a 83y old  Male who presents with a chief complaint of Abdominal Discomfort (17 Jul 2018 14:47)      SUBJECTIVE / OVERNIGHT EVENTS: patient seen and examined by bedside at 12:15 PM, pt denies headache, dizziness, SOB, CP, Palpitations , N/V/D, abdominal pain  pt c/o neck and shoulder pain  which is chronic    MEDICATIONS  (STANDING):  aspirin enteric coated 81 milliGRAM(s) Oral daily  buDESOnide 160 MICROgram(s)/formoterol 4.5 MICROgram(s) Inhaler 2 Puff(s) Inhalation two times a day  clopidogrel Tablet 75 milliGRAM(s) Oral <User Schedule>  furosemide    Tablet 20 milliGRAM(s) Oral daily  heparin  Injectable 5000 Unit(s) SubCutaneous every 8 hours  levothyroxine 75 MICROGram(s) Oral daily  losartan 50 milliGRAM(s) Oral daily  pantoprazole    Tablet 40 milliGRAM(s) Oral before breakfast    MEDICATIONS  (PRN):  acetaminophen   Tablet. 650 milliGRAM(s) Oral every 6 hours PRN mild and moderate pain  ALBUTerol    90 MICROgram(s) HFA Inhaler 2 Puff(s) Inhalation every 4 hours PRN Shortness of Breath and/or Wheezing  aluminum hydroxide/magnesium hydroxide/simethicone Suspension 30 milliLiter(s) Oral every 4 hours PRN Dyspepsia      Vital Signs Last 24 Hrs  T(C): 36.2 (19 Jul 2018 13:00), Max: 36.5 (19 Jul 2018 05:23)  T(F): 97.2 (19 Jul 2018 13:00), Max: 97.7 (19 Jul 2018 05:23)  HR: 70 (19 Jul 2018 17:56) (70 - 82)  BP: 112/80 (19 Jul 2018 17:56) (100/68 - 124/84)  BP(mean): --  RR: 16 (19 Jul 2018 13:00) (16 - 18)  SpO2: 99% (19 Jul 2018 13:00) (98% - 99%)  CAPILLARY BLOOD GLUCOSE        I&O's Summary    18 Jul 2018 07:01  -  19 Jul 2018 07:00  --------------------------------------------------------  IN: 0 mL / OUT: 300 mL / NET: -300 mL    19 Jul 2018 07:01  -  19 Jul 2018 18:23  --------------------------------------------------------  IN: 560 mL / OUT: 500 mL / NET: 60 mL        PHYSICAL EXAM:  GENERAL: NAD, well-developed  HEAD:  Atraumatic, Normocephalic  EYES: EOMI, PERRLA, conjunctiva and sclera clear  NECK: Supple,   CHEST/LUNG: Clear to auscultation bilaterally; No wheeze  HEART: Regular rate and rhythm;   ABDOMEN: Soft, Nontender, Nondistended; Bowel sounds present  EXTREMITIES:  2+ Peripheral Pulses, No clubbing, cyanosis, or edema  PSYCH: AAOx3  NEUROLOGY: non-focal  SKIN: No rashes or lesions        LABS:                        14.6   5.86  )-----------( 243      ( 19 Jul 2018 07:00 )             43.0     07-19    131<L>  |  96<L>  |  12  ----------------------------<  86  4.1   |  19<L>  |  0.66    Ca    8.7      19 Jul 2018 07:00    TPro  6.2  /  Alb  3.2<L>  /  TBili  1.0  /  DBili  x   /  AST  25  /  ALT  15  /  AlkPhos  100  07-18      CARDIAC MARKERS ( 18 Jul 2018 12:15 )  x     / x     / 92 u/L / x     / x              RADIOLOGY & ADDITIONAL TESTS:    Imaging Personally Reviewed:    Consultant(s) Notes Reviewed:  cardiology     Care Discussed with Consultants/Other Providers:

## 2018-07-19 NOTE — PROGRESS NOTE ADULT - PROBLEM SELECTOR PLAN 7
IMPROVE VTE Individual Risk Assessment, Score = 2 c/w HSQ for DVT ppx           RISK                                                          Points  [  ] Previous VTE                                               3  [  ] Thrombophilia                                            2  [  ] Lower limb paresis/paralysis                    2    [  ] Active Cancer (in last 6 months)              2   [ x ] Immobilization > 24 hrs                             1  [  ] ICU/CCU stay > 24 hours                            1  [ x ] Age > 60                                                        1                                            Total Score ____2_____ On rapaflo at home, will change to Flomax for hospital stay

## 2018-07-19 NOTE — PROGRESS NOTE ADULT - SUBJECTIVE AND OBJECTIVE BOX
Subjective: Patient seen and examined. No new events except as noted.     SUBJECTIVE/ROS:  feels ok  No chest pain, dyspnea, palpitation, or dizziness.       MEDICATIONS:  MEDICATIONS  (STANDING):  aspirin enteric coated 81 milliGRAM(s) Oral daily  buDESOnide 160 MICROgram(s)/formoterol 4.5 MICROgram(s) Inhaler 2 Puff(s) Inhalation two times a day  clopidogrel Tablet 75 milliGRAM(s) Oral <User Schedule>  heparin  Injectable 5000 Unit(s) SubCutaneous every 8 hours  levothyroxine 75 MICROGram(s) Oral daily  losartan 50 milliGRAM(s) Oral daily  pantoprazole    Tablet 40 milliGRAM(s) Oral before breakfast      PHYSICAL EXAM:  T(C): 36.2 (07-19-18 @ 13:00), Max: 36.5 (07-19-18 @ 05:23)  HR: 72 (07-19-18 @ 13:00) (72 - 82)  BP: 100/68 (07-19-18 @ 13:00) (100/68 - 124/84)  RR: 16 (07-19-18 @ 13:00) (16 - 18)  SpO2: 99% (07-19-18 @ 13:00) (98% - 99%)  Wt(kg): --  I&O's Summary    18 Jul 2018 07:01  -  19 Jul 2018 07:00  --------------------------------------------------------  IN: 0 mL / OUT: 300 mL / NET: -300 mL    19 Jul 2018 07:01  -  19 Jul 2018 16:41  --------------------------------------------------------  IN: 560 mL / OUT: 500 mL / NET: 60 mL          JVP: Normal  Neck: supple  Lung: clear   CV: S1 S2 , Murmur:  Abd: soft  Ext: No edema  neuro: Awake / alert  Psych: flat affect  Skin: normal       LABS/DATA:    CARDIAC MARKERS:  CARDIAC MARKERS ( 18 Jul 2018 12:15 )  x     / x     / 92 u/L / x     / x                                    14.6   5.86  )-----------( 243      ( 19 Jul 2018 07:00 )             43.0     07-19    131<L>  |  96<L>  |  12  ----------------------------<  86  4.1   |  19<L>  |  0.66    Ca    8.7      19 Jul 2018 07:00    TPro  6.2  /  Alb  3.2<L>  /  TBili  1.0  /  DBili  x   /  AST  25  /  ALT  15  /  AlkPhos  100  07-18    proBNP:   Lipid Profile:   HgA1c:   TSH:     TELE:  EKG:

## 2018-07-19 NOTE — PROGRESS NOTE ADULT - PROBLEM SELECTOR PLAN 2
Non-toxic appearing, pleasant without nausea/vomiting, and CT Abdomen/Pelvis negative for acute intracolonic pathology. Also no SIRS criteria. Likely patient with acid reflux/indigestion. Would c/w Maalox and Pepcid for epigastric discomfort and continue to monitor. Angiogram workup was negative for acute vascular issues.  - Concern in ER for ACS. HsT were 51 and 47 respectively, Heart Score = 4.   abdominal pain improved Non-toxic appearing, pleasant without nausea/vomiting, and CT Abdomen/Pelvis negative for acute intracolonic pathology. Also no SIRS criteria. Likely patient with acid reflux/indigestion. Would c/w Maalox and Pepcid for epigastric discomfort and continue to monitor. Angiogram workup was negative for acute vascular issues.  abdominal pain improved

## 2018-07-20 LAB
BUN SERPL-MCNC: 17 MG/DL — SIGNIFICANT CHANGE UP (ref 7–23)
CALCIUM SERPL-MCNC: 8.6 MG/DL — SIGNIFICANT CHANGE UP (ref 8.4–10.5)
CHLORIDE SERPL-SCNC: 98 MMOL/L — SIGNIFICANT CHANGE UP (ref 98–107)
CO2 SERPL-SCNC: 21 MMOL/L — LOW (ref 22–31)
CREAT SERPL-MCNC: 0.75 MG/DL — SIGNIFICANT CHANGE UP (ref 0.5–1.3)
GLUCOSE SERPL-MCNC: 83 MG/DL — SIGNIFICANT CHANGE UP (ref 70–99)
MAGNESIUM SERPL-MCNC: 2 MG/DL — SIGNIFICANT CHANGE UP (ref 1.6–2.6)
OSMOLALITY SERPL: 276 MOSMO/KG — SIGNIFICANT CHANGE UP (ref 275–295)
PHOSPHATE SERPL-MCNC: 2.6 MG/DL — SIGNIFICANT CHANGE UP (ref 2.5–4.5)
POTASSIUM SERPL-MCNC: 4.1 MMOL/L — SIGNIFICANT CHANGE UP (ref 3.5–5.3)
POTASSIUM SERPL-SCNC: 4.1 MMOL/L — SIGNIFICANT CHANGE UP (ref 3.5–5.3)
SODIUM SERPL-SCNC: 133 MMOL/L — LOW (ref 135–145)

## 2018-07-20 PROCEDURE — 99233 SBSQ HOSP IP/OBS HIGH 50: CPT

## 2018-07-20 RX ADMIN — HEPARIN SODIUM 5000 UNIT(S): 5000 INJECTION INTRAVENOUS; SUBCUTANEOUS at 05:48

## 2018-07-20 RX ADMIN — Medication 20 MILLIGRAM(S): at 05:48

## 2018-07-20 RX ADMIN — LOSARTAN POTASSIUM 50 MILLIGRAM(S): 100 TABLET, FILM COATED ORAL at 05:48

## 2018-07-20 RX ADMIN — Medication 81 MILLIGRAM(S): at 12:39

## 2018-07-20 RX ADMIN — Medication 75 MICROGRAM(S): at 05:48

## 2018-07-20 RX ADMIN — PANTOPRAZOLE SODIUM 40 MILLIGRAM(S): 20 TABLET, DELAYED RELEASE ORAL at 05:48

## 2018-07-20 RX ADMIN — BUDESONIDE AND FORMOTEROL FUMARATE DIHYDRATE 2 PUFF(S): 160; 4.5 AEROSOL RESPIRATORY (INHALATION) at 09:19

## 2018-07-20 RX ADMIN — HEPARIN SODIUM 5000 UNIT(S): 5000 INJECTION INTRAVENOUS; SUBCUTANEOUS at 13:13

## 2018-07-20 RX ADMIN — BUDESONIDE AND FORMOTEROL FUMARATE DIHYDRATE 2 PUFF(S): 160; 4.5 AEROSOL RESPIRATORY (INHALATION) at 21:42

## 2018-07-20 RX ADMIN — Medication 650 MILLIGRAM(S): at 10:15

## 2018-07-20 RX ADMIN — Medication 650 MILLIGRAM(S): at 09:20

## 2018-07-20 RX ADMIN — HEPARIN SODIUM 5000 UNIT(S): 5000 INJECTION INTRAVENOUS; SUBCUTANEOUS at 21:44

## 2018-07-20 NOTE — PROGRESS NOTE ADULT - PROBLEM SELECTOR PLAN 1
likely secondary to CHF, pt s/p cardiac cath  no CAD but low EF   TTE noted,  Severe global left ventricular systolic dysfunction.  pt started on asa and plavix   also started on low dose lasix, c/w ARB, holding BB for soft BP  pt noted to have an episode of feeling lightheaded ,noted to have 2:1 block on tele monitor, EP eval requested, will f/u recommendations

## 2018-07-20 NOTE — PROGRESS NOTE ADULT - SUBJECTIVE AND OBJECTIVE BOX
Patient is a 83y old  Male who presents with a chief complaint of Abdominal Discomfort (17 Jul 2018 14:47)      SUBJECTIVE / OVERNIGHT EVENTS: patient seen and examined by bedside, pt c/o loosing his balance/feeling lightheaded  when he got out of bed,   denies headache, dizziness, SOB, CP, Palpitations , N/V/D, abdominal pain at this time   Pt noted to have 2:1 block on the monitor at the time of the event           MEDICATIONS  (STANDING):  aspirin enteric coated 81 milliGRAM(s) Oral daily  buDESOnide 160 MICROgram(s)/formoterol 4.5 MICROgram(s) Inhaler 2 Puff(s) Inhalation two times a day  clopidogrel Tablet 75 milliGRAM(s) Oral <User Schedule>  furosemide    Tablet 20 milliGRAM(s) Oral daily  heparin  Injectable 5000 Unit(s) SubCutaneous every 8 hours  levothyroxine 75 MICROGram(s) Oral daily  losartan 50 milliGRAM(s) Oral daily  pantoprazole    Tablet 40 milliGRAM(s) Oral before breakfast    MEDICATIONS  (PRN):  acetaminophen   Tablet. 650 milliGRAM(s) Oral every 6 hours PRN mild and moderate pain  ALBUTerol    90 MICROgram(s) HFA Inhaler 2 Puff(s) Inhalation every 4 hours PRN Shortness of Breath and/or Wheezing  aluminum hydroxide/magnesium hydroxide/simethicone Suspension 30 milliLiter(s) Oral every 4 hours PRN Dyspepsia      Vital Signs Last 24 Hrs  T(C): 36.2 (20 Jul 2018 13:03), Max: 36.3 (20 Jul 2018 05:46)  T(F): 97.2 (20 Jul 2018 13:03), Max: 97.3 (20 Jul 2018 05:46)  HR: 83 (20 Jul 2018 13:03) (70 - 98)  BP: 106/71 (20 Jul 2018 13:03) (106/71 - 127/77)  BP(mean): --  RR: 17 (20 Jul 2018 13:03) (16 - 18)  SpO2: 96% (20 Jul 2018 13:03) (96% - 99%)  CAPILLARY BLOOD GLUCOSE        I&O's Summary    19 Jul 2018 07:01  -  20 Jul 2018 07:00  --------------------------------------------------------  IN: 1300 mL / OUT: 500 mL / NET: 800 mL    20 Jul 2018 07:01  -  20 Jul 2018 15:20  --------------------------------------------------------  IN: 200 mL / OUT: 500 mL / NET: -300 mL        PHYSICAL EXAM:  GENERAL: NAD, well-developed  HEAD:  Atraumatic, Normocephalic  EYES: EOMI, PERRLA, conjunctiva and sclera clear  NECK: Supple,   CHEST/LUNG: Clear to auscultation bilaterally; No wheeze  HEART: Regular rate and rhythm;   ABDOMEN: Soft, Nontender, Nondistended; Bowel sounds present  EXTREMITIES:  2+ Peripheral Pulses, No clubbing, cyanosis, or edema  PSYCH: AAOx3  NEUROLOGY: non-focal  SKIN: No rashes or lesions    LABS:                        14.6   5.86  )-----------( 243      ( 19 Jul 2018 07:00 )             43.0     07-20    133<L>  |  98  |  17  ----------------------------<  83  4.1   |  21<L>  |  0.75    Ca    8.6      20 Jul 2018 06:15  Phos  2.6     07-20  Mg     2.0     07-20                RADIOLOGY & ADDITIONAL TESTS:< from: Transthoracic Echocardiogram (07.19.18 @ 16:33) >  CONCLUSIONS:  1. Calcified trileaflet aortic valve with decreased  opening. Peak transaortic valve gradient equals 8 mm Hg,  mean transaortic valve gradient equals 3 mm Hg, estimated  aortic valve area equals 1.9 sqcm (by continuity equation),  consistent with mild aortic stenosis.  2. Severe global left ventricular systolic dysfunction.  3. Mild diastolic dysfunction (Stage I).  4. Normal right ventricular size and function.  5. Estimated pulmonary artery systolic pressure equals 30  mm Hg, assuming right atrial pressure equals 10  mm Hg,  consistent with normal pulmonary pressures.    < end of copied text >      Imaging Personally Reviewed:    Consultant(s) Notes Reviewed:  Cardiology    Care Discussed with Consultants/Other Providers: cardiology, EP

## 2018-07-20 NOTE — PROGRESS NOTE ADULT - PROBLEM SELECTOR PLAN 6
RUL 1.1 cm and 0.4 mm nodule noted on CT chest  repeat CT in 3-6 months recommended  patient and his wife informed   case also d/w PMD , notified about lung nodule

## 2018-07-20 NOTE — PHYSICAL THERAPY INITIAL EVALUATION ADULT - GENERAL OBSERVATIONS, REHAB EVAL
Checked with PATRICK Fonseca prior to visit- pt okay to be seen by PT. Pt seen sitting up in bed, + tele monitor, spouse at b/s.

## 2018-07-20 NOTE — PROGRESS NOTE ADULT - PROBLEM SELECTOR PLAN 2
Non-toxic appearing, pleasant without nausea/vomiting, and CT Abdomen/Pelvis negative for acute intracolonic pathology. Also no SIRS criteria. Likely patient with acid reflux/indigestion. Would c/w Maalox and Pepcid for epigastric discomfort and continue to monitor. Angiogram workup was negative for acute vascular issues.  abdominal pain improved

## 2018-07-20 NOTE — CONSULT NOTE ADULT - SUBJECTIVE AND OBJECTIVE BOX
CHIEF COMPLAINT: Called to evaluate patient for possible ICD/PPM    HISTORY OF PRESENT ILLNESS:  82yo M with PMHx inclusive of HTN, prostate Ca s/p RT, BPH, asthma, c/o back pain radiating to the abdomen. CT Abdomen/Pelvis negative for acute intracolonic pathology.  LHC revealed EF 30%, NCA; TTE EF 29%, severe global LV systolic dysfunction and mild diastolic dysfunction. Echocardiogram revealed severe global LV systolic dysfunction with EF 29%. Patient had episode which he describes as "losing balance when suddenly trying to get OOB after being in bed for three days". Telemetry at the time revealed sinus tachycardia followed by transient episode of 2:1 AV block. Patient denies feeling dizzy or light headed and denies near syncope or syncope. He also denies any prior history of syncope or near syncope. Currently telemetry reveals sinus rhythm with HR 80s. Patient denies any chest pain, SOB, palpitations or dizziness.    PAST MEDICAL & SURGICAL HISTORY:  Hypothyroid  BPH (benign prostatic hypertrophy) with urinary retention  Prostate Cancer: s/p RT  Asthma  Hypertension  2006 turp  Adrenal Gland Cyst  CVA (Cerebral Infarction): pt unsure if it was qa stroke or not lost rt peripheral vision which returned  LEFT CATARACT 5/11/10  Bilat Ing Hernia: 2009      PREVIOUS DIAGNOSTIC TESTING:    Echocardiogram: 7/19/18:   DIMENSIONS:  Dimensions:     Normal Values:  LA:     4.0 cm    2.0 - 4.0 cm  Ao:     4.0 cm    2.0 - 3.8 cm  SEPTUM: 0.8 cm    0.6 - 1.2 cm  PWT:    0.9 cm    0.6 - 1.1 cm  LVIDd:  5.2 cm    3.0 - 5.6 cm  LVIDs:  4.5 cm    1.8 - 4.0 cm  Derived Variables:  LVMI: 79 g/m2  RWT: 0.34  Fractional short: 13 %  Ejection Fraction (Teicholtz): 29 %  ------------------------------------------------------------------------  OBSERVATIONS:  Mitral Valve: Mitral annular calcification, otherwise  normal mitral valve. Minimal mitral regurgitation.  Aortic Root: Normal aortic root.  Aortic Valve: Calcified trileaflet aortic valve with  decreased opening. Peak transaortic valve gradient equals 8  mm Hg, mean transaortic valve gradient equals 3 mm Hg,  estimated aortic valve area equals 1.9 sqcm (by continuity  equation), consistent with mild aortic stenosis. Minimal  aortic regurgitation.  Left Atrium: Normal left atrium.  LA volume index = 30  cc/m2.  Left Ventricle: Severe global left ventricular systolic  dysfunction. Normal left ventricular internal dimensions  and wall thicknesses. Mild diastolic dysfunction (Stage I).  Right Heart: Normal right atrium. Normal right ventricular  size and function. Normal tricuspid valve. Minimal  tricuspid regurgitation. Normal pulmonic valve. Minimal  pulmonic regurgitation.  Pericardium/PleuraNormal pericardium with trace pericardial  effusion.  Hemodynamic: Estimated right ventricular systolic pressure  equals 30 mm Hg, assuming right atrial pressure equals 10  mm Hg, consistent with normal pulmonary pressures.  ------------------------------------------------------------------------  CONCLUSIONS:  1. Calcified trileaflet aortic valve with decreased  opening. Peak transaortic valve gradient equals 8 mm Hg,  mean transaortic valve gradient equals 3 mm Hg, estimated  aortic valve area equals 1.9 sqcm (by continuity equation),  consistent with mild aortic stenosis.  2. Severe global left ventricular systolic dysfunction.  3. Mild diastolic dysfunction (Stage I).  4. Normal right ventricular size and function.  5. Estimated pulmonary artery systolic pressure equals 30  mm Hg, assuming right atrial pressure equals 10  mm Hg,  consistent with normal pulmonary pressures.     Catheterization: 7/20/18  VENTRICLES: EF estimated was 25 %.  CORONARY VESSELS: The coronary circulation is right dominant.  LM:   --  LM: Normal.  LAD:   --  LAD: Angiography showed mild atherosclerosis with no flow  limiting lesions.  --  D1: Angiography showed mild atherosclerosis with no flow limiting  lesions.  CX:   --  Circumflex: Angiography showed mild atherosclerosis with no flow  limiting lesions.  --  OM1: Angiography showed mild atherosclerosis with no flow limiting  lesions.  RCA:   --  RCA: Angiography showed mild atherosclerosis with no flow  limiting lesions.  --  RPDA: Normal.  --  RPLS: Normal.  COMPLICATIONS: There were no complications.  DIAGNOSTIC IMPRESSIONS: Non-obstructive CAD  Severe segmental LV dysfunction  DIAGNOSTIC RECOMMENDATIONS: Medical therapy  INTERVENTIONAL IMPRESSIONS: Non-obstructive CAD  Severe segmental LV dysfunction  INTERVENTIONAL RECOMMENDATIONS: Medical therapy     	    MEDICATIONS:  aspirin enteric coated 81 milliGRAM(s) Oral daily  clopidogrel Tablet 75 milliGRAM(s) Oral <User Schedule>  furosemide    Tablet 20 milliGRAM(s) Oral daily  heparin  Injectable 5000 Unit(s) SubCutaneous every 8 hours  losartan 50 milliGRAM(s) Oral daily  ALBUTerol    90 MICROgram(s) HFA Inhaler 2 Puff(s) Inhalation every 4 hours PRN  buDESOnide 160 MICROgram(s)/formoterol 4.5 MICROgram(s) Inhaler 2 Puff(s) Inhalation two times a day  acetaminophen   Tablet. 650 milliGRAM(s) Oral every 6 hours PRN  aluminum hydroxide/magnesium hydroxide/simethicone Suspension 30 milliLiter(s) Oral every 4 hours PRN  pantoprazole    Tablet 40 milliGRAM(s) Oral before breakfast  levothyroxine 75 MICROGram(s) Oral daily        FAMILY HISTORY:  No pertinent family history in first degree relatives      SOCIAL HISTORY:    Lives with wife    [-] Smoker  Alcohol- 2-3 glasses of wine/day  [-] Drugs    Allergies    No Known Allergies    Intolerances    	    REVIEW OF SYSTEMS:  CONSTITUTIONAL: No fever, weight loss, or fatigue  EYES: No eye pain, visual disturbances, or discharge  ENMT:  No difficulty hearing, tinnitus, vertigo; No sinus or throat pain  NECK: No pain or stiffness  RESPIRATORY: No cough, wheezing, chills or hemoptysis; No Shortness of Breath  CARDIOVASCULAR: No chest pain, palpitations, passing out, dizziness, or leg swelling  GASTROINTESTINAL: No nausea, vomiting, or hematemesis; No diarrhea or constipation. No melena or hematochezia. + abdominal pain  GENITOURINARY: No dysuria, frequency, hematuria, or incontinence  NEUROLOGICAL: No headaches, memory loss, loss of strength, numbness, or tremors  SKIN: No itching, burning, rashes, or lesions   LYMPH Nodes: No enlarged glands  ENDOCRINE: No heat or cold intolerance; No hair loss  MUSCULOSKELETAL: + back pain  PSYCHIATRIC: No depression, anxiety, mood swings, or difficulty sleeping  HEME/LYMPH: No easy bruising, or bleeding gums  ALLERY AND IMMUNOLOGIC: No hives or eczema	    [X] All others negative	  [ ] Unable to obtain    PHYSICAL EXAM:  T(C): 36.2 (07-20-18 @ 13:03), Max: 36.3 (07-20-18 @ 05:46)  HR: 83 (07-20-18 @ 13:03) (70 - 98)  BP: 106/71 (07-20-18 @ 13:03) (106/71 - 127/77)  RR: 17 (07-20-18 @ 13:03) (16 - 18)  SpO2: 96% (07-20-18 @ 13:03) (96% - 99%)  Wt(kg): --  I&O's Summary    19 Jul 2018 07:01  -  20 Jul 2018 07:00  --------------------------------------------------------  IN: 1300 mL / OUT: 500 mL / NET: 800 mL    20 Jul 2018 07:01  -  20 Jul 2018 17:08  --------------------------------------------------------  IN: 200 mL / OUT: 500 mL / NET: -300 mL        Appearance: Normal	  HEENT:   Normal oral mucosa, PERRL, EOMI	  Lymphatic: No lymphadenopathy  Cardiovascular: Normal S1 S2, No JVD, No murmurs, No edema  Respiratory: Lungs clear to auscultation	  Psychiatry: A & O x 3, Mood & affect appropriate  Gastrointestinal:  Soft, Non-tender, + BS	  Skin: No rashes, No ecchymoses, No cyanosis	  Neurologic: Non-focal  Extremities: Normal range of motion, No clubbing, cyanosis or edema  Vascular: Peripheral pulses palpable 2+ bilaterally    TELEMETRY: 	    ECG:  	  RADIOLOGY:  OTHER: 	  	  LABS:	 	    CARDIAC MARKERS:                                  14.6   5.86  )-----------( 243      ( 19 Jul 2018 07:00 )             43.0     07-20    133<L>  |  98  |  17  ----------------------------<  83  4.1   |  21<L>  |  0.75    Ca    8.6      20 Jul 2018 06:15  Phos  2.6     07-20  Mg     2.0     07-20        TSH: 2.92

## 2018-07-20 NOTE — PHYSICAL THERAPY INITIAL EVALUATION ADULT - PATIENT/FAMILY AGREES WITH PLAN
yes/Pt left semi-supine in bed in no distress, vss, call bell in reach, pt with no complaints, RN Raymundo made aware. yes/Pt left semi-supine in bed in no distress after eval, vss, call bell in reach, pt with no complaints, RN Raymundo made aware.

## 2018-07-20 NOTE — PHYSICAL THERAPY INITIAL EVALUATION ADULT - PERTINENT HX OF CURRENT PROBLEM, REHAB EVAL
83 y.o M with PMHx: HTN, prostate Ca s/p RT, bph, asthma, admitted with c/o back pain while sitting on his couch, sharp, "between shoulder blades", and abdominal pain.

## 2018-07-20 NOTE — PROGRESS NOTE ADULT - SUBJECTIVE AND OBJECTIVE BOX
Subjective: Patient seen and examined. No new events except as noted.     SUBJECTIVE/ROS:  episode of dizziness with walking       MEDICATIONS:  MEDICATIONS  (STANDING):  aspirin enteric coated 81 milliGRAM(s) Oral daily  buDESOnide 160 MICROgram(s)/formoterol 4.5 MICROgram(s) Inhaler 2 Puff(s) Inhalation two times a day  clopidogrel Tablet 75 milliGRAM(s) Oral <User Schedule>  furosemide    Tablet 20 milliGRAM(s) Oral daily  heparin  Injectable 5000 Unit(s) SubCutaneous every 8 hours  levothyroxine 75 MICROGram(s) Oral daily  losartan 50 milliGRAM(s) Oral daily  pantoprazole    Tablet 40 milliGRAM(s) Oral before breakfast      PHYSICAL EXAM:  T(C): 36.3 (07-20-18 @ 05:46), Max: 36.3 (07-20-18 @ 05:46)  HR: 82 (07-20-18 @ 05:46) (70 - 82)  BP: 127/77 (07-20-18 @ 05:46) (100/68 - 127/77)  RR: 16 (07-20-18 @ 09:49) (16 - 18)  SpO2: 99% (07-20-18 @ 05:46) (99% - 99%)  Wt(kg): --  I&O's Summary    19 Jul 2018 07:01  -  20 Jul 2018 07:00  --------------------------------------------------------  IN: 1300 mL / OUT: 500 mL / NET: 800 mL    20 Jul 2018 07:01  -  20 Jul 2018 11:25  --------------------------------------------------------  IN: 200 mL / OUT: 500 mL / NET: -300 mL        JVP: Normal  Neck: supple  Lung: clear   CV: S1 S2 , Murmur:  Abd: soft  Ext: No edema  neuro: Awake / alert  Psych: flat affect  Skin: normal       LABS/DATA:    CARDIAC MARKERS:  CARDIAC MARKERS ( 18 Jul 2018 12:15 )  x     / x     / 92 u/L / x     / x                                    14.6   5.86  )-----------( 243      ( 19 Jul 2018 07:00 )             43.0     07-20    133<L>  |  98  |  17  ----------------------------<  83  4.1   |  21<L>  |  0.75    Ca    8.6      20 Jul 2018 06:15  Phos  2.6     07-20  Mg     2.0     07-20      proBNP:   Lipid Profile:   HgA1c:   TSH:     TELE:  EKG:

## 2018-07-20 NOTE — CONSULT NOTE ADULT - ASSESSMENT
84yo M with PMHx inclusive of HTN, prostate Ca s/p RT, BPH, asthma, admitted with NSTEMI. Cardiac catheterization revealed non obstructive CAD. Echocardiogram shows severe global LV systolic dysfunction with EF 29%. Patient ahd episode of sinus tachycardia @ 115 bpm followed by transient 2:1 AV block while trying to get out of bed. He reports feeling like losing balance while trying to stand up. Patient had normal AV conduction at 115 bpm prior to the episode. Episode of transient 2:1 AV block likely vagally mediated. 82yo M with PMHx inclusive of HTN, prostate Ca s/p RT, BPH, asthma, admitted with NSTEMI. Cardiac catheterization revealed non obstructive CAD. Echocardiogram shows severe global LV systolic dysfunction with EF 29%. Patient ahd episode of sinus tachycardia @ 115 bpm followed by transient 2:1 AV block while trying to get out of bed. He reports feeling like losing balance while trying to stand up. Patient had normal AV conduction at 115 bpm prior to the episode. Episode of transient 2:1 AV block when trying to get OOB. There was no significant P-P prolongation so unlikely vagally mediated. Also, patient has severe global LV dysfunction and would require BB for optimal medical management. Discussed with Dr. Richardson and Dr. Moy. Patient would need pacing support for beta blocker therapy to be initiated and would recommend ICD implantation for primary prevention of sudden cardiac death in the setting of severe LV dysfunction. We discussed management fo ICD therapy through out life including deactivation of ICD. All questions were answered. Patient would like to discuss with family prior to making a decision.  - Continue to monitor on telemetry  - Continue management as per primary team  - NPO p MN on Sunday night for possible ICD implantation on Monday if patient/family agrees

## 2018-07-21 LAB
BUN SERPL-MCNC: 19 MG/DL — SIGNIFICANT CHANGE UP (ref 7–23)
CALCIUM SERPL-MCNC: 8.8 MG/DL — SIGNIFICANT CHANGE UP (ref 8.4–10.5)
CHLORIDE SERPL-SCNC: 96 MMOL/L — LOW (ref 98–107)
CHLORIDE UR-SCNC: 20 MMOL/L — SIGNIFICANT CHANGE UP
CO2 SERPL-SCNC: 19 MMOL/L — LOW (ref 22–31)
CREAT SERPL-MCNC: 0.73 MG/DL — SIGNIFICANT CHANGE UP (ref 0.5–1.3)
GLUCOSE SERPL-MCNC: 93 MG/DL — SIGNIFICANT CHANGE UP (ref 70–99)
HCT VFR BLD CALC: 41.4 % — SIGNIFICANT CHANGE UP (ref 39–50)
HGB BLD-MCNC: 14.1 G/DL — SIGNIFICANT CHANGE UP (ref 13–17)
MAGNESIUM SERPL-MCNC: 2 MG/DL — SIGNIFICANT CHANGE UP (ref 1.6–2.6)
MCHC RBC-ENTMCNC: 31.4 PG — SIGNIFICANT CHANGE UP (ref 27–34)
MCHC RBC-ENTMCNC: 34.1 % — SIGNIFICANT CHANGE UP (ref 32–36)
MCV RBC AUTO: 92.2 FL — SIGNIFICANT CHANGE UP (ref 80–100)
NRBC # FLD: 0 — SIGNIFICANT CHANGE UP
OSMOLALITY UR: 533 MOSMO/KG — SIGNIFICANT CHANGE UP (ref 50–1200)
PHOSPHATE SERPL-MCNC: 2.8 MG/DL — SIGNIFICANT CHANGE UP (ref 2.5–4.5)
PLATELET # BLD AUTO: 293 K/UL — SIGNIFICANT CHANGE UP (ref 150–400)
PMV BLD: 9.9 FL — SIGNIFICANT CHANGE UP (ref 7–13)
POTASSIUM SERPL-MCNC: 3.8 MMOL/L — SIGNIFICANT CHANGE UP (ref 3.5–5.3)
POTASSIUM SERPL-SCNC: 3.8 MMOL/L — SIGNIFICANT CHANGE UP (ref 3.5–5.3)
POTASSIUM UR-SCNC: 44 MMOL/L — SIGNIFICANT CHANGE UP
RBC # BLD: 4.49 M/UL — SIGNIFICANT CHANGE UP (ref 4.2–5.8)
RBC # FLD: 12.5 % — SIGNIFICANT CHANGE UP (ref 10.3–14.5)
SODIUM SERPL-SCNC: 130 MMOL/L — LOW (ref 135–145)
SODIUM UR-SCNC: 29 MMOL/L — SIGNIFICANT CHANGE UP
WBC # BLD: 6.3 K/UL — SIGNIFICANT CHANGE UP (ref 3.8–10.5)
WBC # FLD AUTO: 6.3 K/UL — SIGNIFICANT CHANGE UP (ref 3.8–10.5)

## 2018-07-21 PROCEDURE — 99233 SBSQ HOSP IP/OBS HIGH 50: CPT

## 2018-07-21 RX ADMIN — HEPARIN SODIUM 5000 UNIT(S): 5000 INJECTION INTRAVENOUS; SUBCUTANEOUS at 05:46

## 2018-07-21 RX ADMIN — HEPARIN SODIUM 5000 UNIT(S): 5000 INJECTION INTRAVENOUS; SUBCUTANEOUS at 21:24

## 2018-07-21 RX ADMIN — Medication 75 MICROGRAM(S): at 05:45

## 2018-07-21 RX ADMIN — BUDESONIDE AND FORMOTEROL FUMARATE DIHYDRATE 2 PUFF(S): 160; 4.5 AEROSOL RESPIRATORY (INHALATION) at 09:12

## 2018-07-21 RX ADMIN — Medication 81 MILLIGRAM(S): at 12:41

## 2018-07-21 RX ADMIN — BUDESONIDE AND FORMOTEROL FUMARATE DIHYDRATE 2 PUFF(S): 160; 4.5 AEROSOL RESPIRATORY (INHALATION) at 21:24

## 2018-07-21 RX ADMIN — Medication 20 MILLIGRAM(S): at 05:45

## 2018-07-21 RX ADMIN — HEPARIN SODIUM 5000 UNIT(S): 5000 INJECTION INTRAVENOUS; SUBCUTANEOUS at 13:17

## 2018-07-21 RX ADMIN — LOSARTAN POTASSIUM 50 MILLIGRAM(S): 100 TABLET, FILM COATED ORAL at 05:45

## 2018-07-21 RX ADMIN — PANTOPRAZOLE SODIUM 40 MILLIGRAM(S): 20 TABLET, DELAYED RELEASE ORAL at 05:45

## 2018-07-21 NOTE — PROGRESS NOTE ADULT - SUBJECTIVE AND OBJECTIVE BOX
Subjective: Patient seen and examined. No new events except as noted.     SUBJECTIVE/ROS:  feels ok       MEDICATIONS:  MEDICATIONS  (STANDING):  aspirin enteric coated 81 milliGRAM(s) Oral daily  buDESOnide 160 MICROgram(s)/formoterol 4.5 MICROgram(s) Inhaler 2 Puff(s) Inhalation two times a day  clopidogrel Tablet 75 milliGRAM(s) Oral <User Schedule>  furosemide    Tablet 20 milliGRAM(s) Oral daily  heparin  Injectable 5000 Unit(s) SubCutaneous every 8 hours  levothyroxine 75 MICROGram(s) Oral daily  losartan 50 milliGRAM(s) Oral daily  pantoprazole    Tablet 40 milliGRAM(s) Oral before breakfast      PHYSICAL EXAM:  T(C): 36.7 (07-21-18 @ 05:43), Max: 36.7 (07-21-18 @ 05:43)  HR: 82 (07-21-18 @ 05:43) (82 - 98)  BP: 103/75 (07-21-18 @ 05:43) (103/68 - 120/76)  RR: 18 (07-21-18 @ 05:43) (16 - 18)  SpO2: 95% (07-21-18 @ 05:43) (95% - 96%)  Wt(kg): --  I&O's Summary    20 Jul 2018 07:01  -  21 Jul 2018 07:00  --------------------------------------------------------  IN: 1260 mL / OUT: 700 mL / NET: 560 mL        JVP: Normal  Neck: supple  Lung: clear   CV: S1 S2 , Murmur:  Abd: soft  Ext: No edema  neuro: Awake / alert  Psych: flat affect  Skin: normal     LABS/DATA:    CARDIAC MARKERS:  CARDIAC MARKERS ( 18 Jul 2018 12:15 )  x     / x     / 92 u/L / x     / x                                    14.1   6.30  )-----------( 293      ( 21 Jul 2018 06:00 )             41.4     07-21    130<L>  |  96<L>  |  19  ----------------------------<  93  3.8   |  19<L>  |  0.73    Ca    8.8      21 Jul 2018 06:00  Phos  2.8     07-21  Mg     2.0     07-21      proBNP:   Lipid Profile:   HgA1c:   TSH:     TELE:  EKG:

## 2018-07-21 NOTE — PROGRESS NOTE ADULT - SUBJECTIVE AND OBJECTIVE BOX
Patient is a 83y old  Male who presents with a chief complaint of Abdominal Discomfort (17 Jul 2018 14:47)      SUBJECTIVE / OVERNIGHT EVENTS:  patient seen and examined by bedside at 11:45 Am, denies headache, dizziness, SOB, CP, Palpitations , N/V/D, abdominal pain        MEDICATIONS  (STANDING):  aspirin enteric coated 81 milliGRAM(s) Oral daily  buDESOnide 160 MICROgram(s)/formoterol 4.5 MICROgram(s) Inhaler 2 Puff(s) Inhalation two times a day  clopidogrel Tablet 75 milliGRAM(s) Oral <User Schedule>  furosemide    Tablet 20 milliGRAM(s) Oral daily  heparin  Injectable 5000 Unit(s) SubCutaneous every 8 hours  levothyroxine 75 MICROGram(s) Oral daily  losartan 50 milliGRAM(s) Oral daily  pantoprazole    Tablet 40 milliGRAM(s) Oral before breakfast    MEDICATIONS  (PRN):  acetaminophen   Tablet. 650 milliGRAM(s) Oral every 6 hours PRN mild and moderate pain  ALBUTerol    90 MICROgram(s) HFA Inhaler 2 Puff(s) Inhalation every 4 hours PRN Shortness of Breath and/or Wheezing  aluminum hydroxide/magnesium hydroxide/simethicone Suspension 30 milliLiter(s) Oral every 4 hours PRN Dyspepsia      Vital Signs Last 24 Hrs  T(C): 36.5 (21 Jul 2018 13:23), Max: 36.7 (21 Jul 2018 05:43)  T(F): 97.7 (21 Jul 2018 13:23), Max: 98 (21 Jul 2018 05:43)  HR: 73 (21 Jul 2018 13:23) (73 - 89)  BP: 116/66 (21 Jul 2018 13:23) (103/68 - 120/76)  BP(mean): --  RR: 19 (21 Jul 2018 13:23) (18 - 19)  SpO2: 96% (21 Jul 2018 13:23) (95% - 96%)  CAPILLARY BLOOD GLUCOSE        I&O's Summary    20 Jul 2018 07:01  -  21 Jul 2018 07:00  --------------------------------------------------------  IN: 1260 mL / OUT: 700 mL / NET: 560 mL    21 Jul 2018 07:01  -  21 Jul 2018 15:44  --------------------------------------------------------  IN: 430 mL / OUT: 0 mL / NET: 430 mL      PHYSICAL EXAM:  GENERAL: NAD, well-developed  HEAD:  Atraumatic, Normocephalic  EYES: EOMI, PERRLA, conjunctiva and sclera clear  NECK: Supple,   CHEST/LUNG: Clear to auscultation bilaterally; No wheeze  HEART: Regular rate and rhythm;   ABDOMEN: Soft, Nontender, Nondistended; Bowel sounds present  EXTREMITIES:  2+ Peripheral Pulses, No clubbing, cyanosis, or edema  PSYCH: AAOx3  NEUROLOGY: non-focal  SKIN: No rashes or lesions      LABS:                        14.1   6.30  )-----------( 293      ( 21 Jul 2018 06:00 )             41.4     07-21    130<L>  |  96<L>  |  19  ----------------------------<  93  3.8   |  19<L>  |  0.73    Ca    8.8      21 Jul 2018 06:00  Phos  2.8     07-21  Mg     2.0     07-21                RADIOLOGY & ADDITIONAL TESTS:    Imaging Personally Reviewed:    Consultant(s) Notes Reviewed:  EP, cardiology     Care Discussed with Consultants/Other Providers:

## 2018-07-21 NOTE — PROGRESS NOTE ADULT - PROBLEM SELECTOR PLAN 1
likely secondary to CHF, pt s/p cardiac cath  no CAD but low EF   TTE noted,  Severe global left ventricular systolic dysfunction.  pt started on asa and plavix   also started on low dose lasix, c/w ARB, holding BB for soft BP  pt noted to have an episode of feeling lightheaded on 7/20 ,noted to have 2:1 block on tele monitor, EP eval requested , recommend ICD, placement, case d/w pt and wife, agreeable, will plan for procedure on Monday   NPO after midnight on Sunday

## 2018-07-22 LAB
BUN SERPL-MCNC: 20 MG/DL — SIGNIFICANT CHANGE UP (ref 7–23)
CALCIUM SERPL-MCNC: 8.9 MG/DL — SIGNIFICANT CHANGE UP (ref 8.4–10.5)
CHLORIDE SERPL-SCNC: 97 MMOL/L — LOW (ref 98–107)
CO2 SERPL-SCNC: 19 MMOL/L — LOW (ref 22–31)
CREAT SERPL-MCNC: 0.77 MG/DL — SIGNIFICANT CHANGE UP (ref 0.5–1.3)
GLUCOSE SERPL-MCNC: 85 MG/DL — SIGNIFICANT CHANGE UP (ref 70–99)
HCT VFR BLD CALC: 41.5 % — SIGNIFICANT CHANGE UP (ref 39–50)
HGB BLD-MCNC: 14.3 G/DL — SIGNIFICANT CHANGE UP (ref 13–17)
MAGNESIUM SERPL-MCNC: 1.9 MG/DL — SIGNIFICANT CHANGE UP (ref 1.6–2.6)
MCHC RBC-ENTMCNC: 31.6 PG — SIGNIFICANT CHANGE UP (ref 27–34)
MCHC RBC-ENTMCNC: 34.5 % — SIGNIFICANT CHANGE UP (ref 32–36)
MCV RBC AUTO: 91.6 FL — SIGNIFICANT CHANGE UP (ref 80–100)
NRBC # FLD: 0 — SIGNIFICANT CHANGE UP
PHOSPHATE SERPL-MCNC: 3 MG/DL — SIGNIFICANT CHANGE UP (ref 2.5–4.5)
PLATELET # BLD AUTO: 284 K/UL — SIGNIFICANT CHANGE UP (ref 150–400)
PMV BLD: 10 FL — SIGNIFICANT CHANGE UP (ref 7–13)
POTASSIUM SERPL-MCNC: 3.7 MMOL/L — SIGNIFICANT CHANGE UP (ref 3.5–5.3)
POTASSIUM SERPL-SCNC: 3.7 MMOL/L — SIGNIFICANT CHANGE UP (ref 3.5–5.3)
RBC # BLD: 4.53 M/UL — SIGNIFICANT CHANGE UP (ref 4.2–5.8)
RBC # FLD: 12.3 % — SIGNIFICANT CHANGE UP (ref 10.3–14.5)
SODIUM SERPL-SCNC: 134 MMOL/L — LOW (ref 135–145)
WBC # BLD: 5.78 K/UL — SIGNIFICANT CHANGE UP (ref 3.8–10.5)
WBC # FLD AUTO: 5.78 K/UL — SIGNIFICANT CHANGE UP (ref 3.8–10.5)

## 2018-07-22 PROCEDURE — 99233 SBSQ HOSP IP/OBS HIGH 50: CPT

## 2018-07-22 RX ADMIN — Medication 81 MILLIGRAM(S): at 09:19

## 2018-07-22 RX ADMIN — HEPARIN SODIUM 5000 UNIT(S): 5000 INJECTION INTRAVENOUS; SUBCUTANEOUS at 21:36

## 2018-07-22 RX ADMIN — HEPARIN SODIUM 5000 UNIT(S): 5000 INJECTION INTRAVENOUS; SUBCUTANEOUS at 13:04

## 2018-07-22 RX ADMIN — BUDESONIDE AND FORMOTEROL FUMARATE DIHYDRATE 2 PUFF(S): 160; 4.5 AEROSOL RESPIRATORY (INHALATION) at 21:36

## 2018-07-22 RX ADMIN — HEPARIN SODIUM 5000 UNIT(S): 5000 INJECTION INTRAVENOUS; SUBCUTANEOUS at 05:03

## 2018-07-22 RX ADMIN — Medication 75 MICROGRAM(S): at 05:03

## 2018-07-22 RX ADMIN — CLOPIDOGREL BISULFATE 75 MILLIGRAM(S): 75 TABLET, FILM COATED ORAL at 09:19

## 2018-07-22 RX ADMIN — PANTOPRAZOLE SODIUM 40 MILLIGRAM(S): 20 TABLET, DELAYED RELEASE ORAL at 05:03

## 2018-07-22 RX ADMIN — BUDESONIDE AND FORMOTEROL FUMARATE DIHYDRATE 2 PUFF(S): 160; 4.5 AEROSOL RESPIRATORY (INHALATION) at 10:32

## 2018-07-22 RX ADMIN — LOSARTAN POTASSIUM 50 MILLIGRAM(S): 100 TABLET, FILM COATED ORAL at 05:03

## 2018-07-22 RX ADMIN — Medication 20 MILLIGRAM(S): at 05:03

## 2018-07-22 NOTE — PROGRESS NOTE ADULT - SUBJECTIVE AND OBJECTIVE BOX
Patient is a 83y old  Male who presents with a chief complaint of Abdominal Discomfort (17 Jul 2018 14:47)      SUBJECTIVE / OVERNIGHT EVENTS: patient seen and examined by bedside, denies headache, dizziness, SOB, CP, Palpitations , N/V/D, abdominal pain  pt c/o constipation for last 2 days        MEDICATIONS  (STANDING):  aspirin enteric coated 81 milliGRAM(s) Oral daily  buDESOnide 160 MICROgram(s)/formoterol 4.5 MICROgram(s) Inhaler 2 Puff(s) Inhalation two times a day  clopidogrel Tablet 75 milliGRAM(s) Oral <User Schedule>  furosemide    Tablet 20 milliGRAM(s) Oral daily  heparin  Injectable 5000 Unit(s) SubCutaneous every 8 hours  levothyroxine 75 MICROGram(s) Oral daily  losartan 50 milliGRAM(s) Oral daily  pantoprazole    Tablet 40 milliGRAM(s) Oral before breakfast    MEDICATIONS  (PRN):  acetaminophen   Tablet. 650 milliGRAM(s) Oral every 6 hours PRN mild and moderate pain  ALBUTerol    90 MICROgram(s) HFA Inhaler 2 Puff(s) Inhalation every 4 hours PRN Shortness of Breath and/or Wheezing  aluminum hydroxide/magnesium hydroxide/simethicone Suspension 30 milliLiter(s) Oral every 4 hours PRN Dyspepsia      Vital Signs Last 24 Hrs  T(C): 36.7 (22 Jul 2018 13:43), Max: 36.8 (21 Jul 2018 21:23)  T(F): 98.1 (22 Jul 2018 13:43), Max: 98.3 (22 Jul 2018 05:01)  HR: 76 (22 Jul 2018 13:43) (76 - 77)  BP: 121/73 (22 Jul 2018 13:43) (121/73 - 149/85)  BP(mean): --  RR: 18 (22 Jul 2018 13:43) (18 - 18)  SpO2: 99% (22 Jul 2018 13:43) (98% - 99%)  CAPILLARY BLOOD GLUCOSE        I&O's Summary    21 Jul 2018 07:01  -  22 Jul 2018 07:00  --------------------------------------------------------  IN: 1110 mL / OUT: 0 mL / NET: 1110 mL    PHYSICAL EXAM:  GENERAL: NAD, well-developed  HEAD:  Atraumatic, Normocephalic  EYES: EOMI, PERRLA, conjunctiva and sclera clear  NECK: Supple,   CHEST/LUNG: Clear to auscultation bilaterally; No wheeze  HEART: Regular rate and rhythm;   ABDOMEN: Soft, Nontender, Nondistended; Bowel sounds present  EXTREMITIES:  2+ Peripheral Pulses, No clubbing, cyanosis, or edema  PSYCH: AAOx3  NEUROLOGY: non-focal  SKIN: No rashes or lesions    LABS:                        14.3   5.78  )-----------( 284      ( 22 Jul 2018 07:15 )             41.5     07-22    134<L>  |  97<L>  |  20  ----------------------------<  85  3.7   |  19<L>  |  0.77    Ca    8.9      22 Jul 2018 07:15  Phos  3.0     07-22  Mg     1.9     07-22                RADIOLOGY & ADDITIONAL TESTS:    Imaging Personally Reviewed:    Consultant(s) Notes Reviewed:  cardiology, EP    Care Discussed with Consultants/Other Providers:

## 2018-07-22 NOTE — PROGRESS NOTE ADULT - ATTENDING COMMENTS
PT eval noted, anticipate home w/ no skilled PT need, will follow PT
PT eval noted, anticipate home w/ no skilled PT need, will follow PT
PT eval

## 2018-07-22 NOTE — PROGRESS NOTE ADULT - SUBJECTIVE AND OBJECTIVE BOX
S/24 Events: No acute events overnight.     O:  T(C): 36.8 (07-22-18 @ 05:01), Max: 36.8 (07-21-18 @ 21:23)  HR: 77 (07-22-18 @ 05:01) (73 - 77)  BP: 149/85 (07-22-18 @ 05:01) (116/66 - 149/85)  RR: 18 (07-22-18 @ 05:01) (18 - 19)  SpO2: 99% (07-22-18 @ 05:01) (96% - 99%)  Wt(kg): --      Tele:    O/E:  Gen: NAD  HEENT: EOMI  CV: RRR, normal S1 + S2, no m/r/g  Lungs: CTAB  Abd: soft, non-tender  Ext: No edema    Labs:                        14.3   5.78  )-----------( 284      ( 22 Jul 2018 07:15 )             41.5     07-22    134<L>  |  97<L>  |  20  ----------------------------<  85  3.7   |  19<L>  |  0.77    Ca    8.9      22 Jul 2018 07:15  Phos  3.0     07-22  Mg     1.9     07-22                Meds:  MEDICATIONS  (STANDING):  aspirin enteric coated 81 milliGRAM(s) Oral daily  buDESOnide 160 MICROgram(s)/formoterol 4.5 MICROgram(s) Inhaler 2 Puff(s) Inhalation two times a day  clopidogrel Tablet 75 milliGRAM(s) Oral <User Schedule>  furosemide    Tablet 20 milliGRAM(s) Oral daily  heparin  Injectable 5000 Unit(s) SubCutaneous every 8 hours  levothyroxine 75 MICROGram(s) Oral daily  losartan 50 milliGRAM(s) Oral daily  pantoprazole    Tablet 40 milliGRAM(s) Oral before breakfast      A/P:  82yo M with PMHx inclusive of HTN, prostate Ca s/p RT, BPH, asthma, admitted with NSTEMI. Cardiac catheterization revealed non obstructive CAD. Echocardiogram shows severe global LV systolic dysfunction with EF 29%. Patient ahd episode of sinus tachycardia @ 115 bpm followed by transient 2:1 AV block while trying to get out of bed. He reports feeling like losing balance while trying to stand up. Patient had normal AV conduction at 115 bpm prior to the episode. Episode of transient 2:1 AV block when trying to get OOB. There was no significant P-P prolongation so unlikely vagally mediated. Also, patient has severe global LV dysfunction and would require BB for optimal medical management. Patient would need pacing support for beta blocker therapy to be initiated and would recommend ICD implantation for primary prevention of sudden cardiac death in the setting of severe LV dysfunction.     - NPO at midnight for ICD tomorrow.   - Continue to monitor on telemetry  - Continue management as per primary team      [facility]  Cardiology Fellow

## 2018-07-22 NOTE — PROGRESS NOTE ADULT - SUBJECTIVE AND OBJECTIVE BOX
Subjective: Patient seen and examined. No new events except as noted.     SUBJECTIVE/ROS:  no new events       MEDICATIONS:  MEDICATIONS  (STANDING):  aspirin enteric coated 81 milliGRAM(s) Oral daily  buDESOnide 160 MICROgram(s)/formoterol 4.5 MICROgram(s) Inhaler 2 Puff(s) Inhalation two times a day  clopidogrel Tablet 75 milliGRAM(s) Oral <User Schedule>  furosemide    Tablet 20 milliGRAM(s) Oral daily  heparin  Injectable 5000 Unit(s) SubCutaneous every 8 hours  levothyroxine 75 MICROGram(s) Oral daily  losartan 50 milliGRAM(s) Oral daily  pantoprazole    Tablet 40 milliGRAM(s) Oral before breakfast      PHYSICAL EXAM:  T(C): 36.8 (07-22-18 @ 05:01), Max: 36.8 (07-21-18 @ 21:23)  HR: 77 (07-22-18 @ 05:01) (73 - 77)  BP: 149/85 (07-22-18 @ 05:01) (116/66 - 149/85)  RR: 18 (07-22-18 @ 05:01) (18 - 19)  SpO2: 99% (07-22-18 @ 05:01) (96% - 99%)  Wt(kg): --  I&O's Summary    21 Jul 2018 07:01  -  22 Jul 2018 07:00  --------------------------------------------------------  IN: 1110 mL / OUT: 0 mL / NET: 1110 mL        JVP: Normal  Neck: supple  Lung: clear   CV: S1 S2 , Murmur:  Abd: soft  Ext: No edema  neuro: Awake / alert  Psych: flat affect  Skin: normal     LABS/DATA:    CARDIAC MARKERS:                                14.3   5.78  )-----------( 284      ( 22 Jul 2018 07:15 )             41.5     07-22    134<L>  |  97<L>  |  20  ----------------------------<  85  3.7   |  19<L>  |  0.77    Ca    8.9      22 Jul 2018 07:15  Phos  3.0     07-22  Mg     1.9     07-22      proBNP:   Lipid Profile:   HgA1c:   TSH:     TELE:  EKG:

## 2018-07-23 DIAGNOSIS — I50.22 CHRONIC SYSTOLIC (CONGESTIVE) HEART FAILURE: ICD-10-CM

## 2018-07-23 LAB
BUN SERPL-MCNC: 17 MG/DL — SIGNIFICANT CHANGE UP (ref 7–23)
BUN SERPL-MCNC: 17 MG/DL — SIGNIFICANT CHANGE UP (ref 7–23)
CALCIUM SERPL-MCNC: 8.7 MG/DL — SIGNIFICANT CHANGE UP (ref 8.4–10.5)
CALCIUM SERPL-MCNC: 8.7 MG/DL — SIGNIFICANT CHANGE UP (ref 8.4–10.5)
CHLORIDE SERPL-SCNC: 97 MMOL/L — LOW (ref 98–107)
CHLORIDE SERPL-SCNC: 97 MMOL/L — LOW (ref 98–107)
CHOLEST SERPL-MCNC: 145 MG/DL — SIGNIFICANT CHANGE UP (ref 120–199)
CO2 SERPL-SCNC: 23 MMOL/L — SIGNIFICANT CHANGE UP (ref 22–31)
CO2 SERPL-SCNC: 23 MMOL/L — SIGNIFICANT CHANGE UP (ref 22–31)
CREAT SERPL-MCNC: 0.7 MG/DL — SIGNIFICANT CHANGE UP (ref 0.5–1.3)
CREAT SERPL-MCNC: 0.7 MG/DL — SIGNIFICANT CHANGE UP (ref 0.5–1.3)
GLUCOSE SERPL-MCNC: 84 MG/DL — SIGNIFICANT CHANGE UP (ref 70–99)
GLUCOSE SERPL-MCNC: 84 MG/DL — SIGNIFICANT CHANGE UP (ref 70–99)
HCT VFR BLD CALC: 41.5 % — SIGNIFICANT CHANGE UP (ref 39–50)
HDLC SERPL-MCNC: 54 MG/DL — SIGNIFICANT CHANGE UP (ref 35–55)
HGB BLD-MCNC: 14 G/DL — SIGNIFICANT CHANGE UP (ref 13–17)
LIPID PNL WITH DIRECT LDL SERPL: 80 MG/DL — SIGNIFICANT CHANGE UP
MAGNESIUM SERPL-MCNC: 2 MG/DL — SIGNIFICANT CHANGE UP (ref 1.6–2.6)
MCHC RBC-ENTMCNC: 31.3 PG — SIGNIFICANT CHANGE UP (ref 27–34)
MCHC RBC-ENTMCNC: 33.7 % — SIGNIFICANT CHANGE UP (ref 32–36)
MCV RBC AUTO: 92.8 FL — SIGNIFICANT CHANGE UP (ref 80–100)
NRBC # FLD: 0 — SIGNIFICANT CHANGE UP
PHOSPHATE SERPL-MCNC: 3 MG/DL — SIGNIFICANT CHANGE UP (ref 2.5–4.5)
PLATELET # BLD AUTO: 334 K/UL — SIGNIFICANT CHANGE UP (ref 150–400)
PMV BLD: 10.1 FL — SIGNIFICANT CHANGE UP (ref 7–13)
POTASSIUM SERPL-MCNC: 3.8 MMOL/L — SIGNIFICANT CHANGE UP (ref 3.5–5.3)
POTASSIUM SERPL-MCNC: 3.8 MMOL/L — SIGNIFICANT CHANGE UP (ref 3.5–5.3)
POTASSIUM SERPL-SCNC: 3.8 MMOL/L — SIGNIFICANT CHANGE UP (ref 3.5–5.3)
POTASSIUM SERPL-SCNC: 3.8 MMOL/L — SIGNIFICANT CHANGE UP (ref 3.5–5.3)
RBC # BLD: 4.47 M/UL — SIGNIFICANT CHANGE UP (ref 4.2–5.8)
RBC # FLD: 12.3 % — SIGNIFICANT CHANGE UP (ref 10.3–14.5)
SODIUM SERPL-SCNC: 134 MMOL/L — LOW (ref 135–145)
SODIUM SERPL-SCNC: 134 MMOL/L — LOW (ref 135–145)
TRIGL SERPL-MCNC: 72 MG/DL — SIGNIFICANT CHANGE UP (ref 10–149)
WBC # BLD: 7.25 K/UL — SIGNIFICANT CHANGE UP (ref 3.8–10.5)
WBC # FLD AUTO: 7.25 K/UL — SIGNIFICANT CHANGE UP (ref 3.8–10.5)

## 2018-07-23 PROCEDURE — 99233 SBSQ HOSP IP/OBS HIGH 50: CPT

## 2018-07-23 PROCEDURE — 93010 ELECTROCARDIOGRAM REPORT: CPT | Mod: 76

## 2018-07-23 PROCEDURE — 93010 ELECTROCARDIOGRAM REPORT: CPT

## 2018-07-23 PROCEDURE — 33249 INSJ/RPLCMT DEFIB W/LEAD(S): CPT

## 2018-07-23 PROCEDURE — 71045 X-RAY EXAM CHEST 1 VIEW: CPT | Mod: 26

## 2018-07-23 RX ORDER — HEPARIN SODIUM 5000 [USP'U]/ML
5000 INJECTION INTRAVENOUS; SUBCUTANEOUS EVERY 12 HOURS
Qty: 0 | Refills: 0 | Status: DISCONTINUED | OUTPATIENT
Start: 2018-07-23 | End: 2018-07-23

## 2018-07-23 RX ORDER — FLUTICASONE PROPIONATE 50 MCG
1 SPRAY, SUSPENSION NASAL
Qty: 0 | Refills: 0 | Status: DISCONTINUED | OUTPATIENT
Start: 2018-07-23 | End: 2018-07-24

## 2018-07-23 RX ORDER — CEFAZOLIN SODIUM 1 G
1000 VIAL (EA) INJECTION EVERY 8 HOURS
Qty: 0 | Refills: 0 | Status: COMPLETED | OUTPATIENT
Start: 2018-07-23 | End: 2018-07-24

## 2018-07-23 RX ORDER — ATORVASTATIN CALCIUM 80 MG/1
20 TABLET, FILM COATED ORAL AT BEDTIME
Qty: 0 | Refills: 0 | Status: DISCONTINUED | OUTPATIENT
Start: 2018-07-23 | End: 2018-07-24

## 2018-07-23 RX ADMIN — LOSARTAN POTASSIUM 50 MILLIGRAM(S): 100 TABLET, FILM COATED ORAL at 05:24

## 2018-07-23 RX ADMIN — BUDESONIDE AND FORMOTEROL FUMARATE DIHYDRATE 2 PUFF(S): 160; 4.5 AEROSOL RESPIRATORY (INHALATION) at 08:45

## 2018-07-23 RX ADMIN — ATORVASTATIN CALCIUM 20 MILLIGRAM(S): 80 TABLET, FILM COATED ORAL at 21:47

## 2018-07-23 RX ADMIN — Medication 650 MILLIGRAM(S): at 23:14

## 2018-07-23 RX ADMIN — ALBUTEROL 2 PUFF(S): 90 AEROSOL, METERED ORAL at 10:48

## 2018-07-23 RX ADMIN — Medication 75 MICROGRAM(S): at 05:24

## 2018-07-23 RX ADMIN — Medication 20 MILLIGRAM(S): at 05:24

## 2018-07-23 RX ADMIN — HEPARIN SODIUM 5000 UNIT(S): 5000 INJECTION INTRAVENOUS; SUBCUTANEOUS at 05:24

## 2018-07-23 RX ADMIN — PANTOPRAZOLE SODIUM 40 MILLIGRAM(S): 20 TABLET, DELAYED RELEASE ORAL at 05:24

## 2018-07-23 RX ADMIN — BUDESONIDE AND FORMOTEROL FUMARATE DIHYDRATE 2 PUFF(S): 160; 4.5 AEROSOL RESPIRATORY (INHALATION) at 21:42

## 2018-07-23 RX ADMIN — Medication 100 MILLIGRAM(S): at 23:15

## 2018-07-23 NOTE — PROGRESS NOTE ADULT - PROBLEM SELECTOR PLAN 7
RUL 1.1 cm and 0.4 mm nodule noted on CT chest  repeat CT in 3-6 months recommended  patient and his wife informed   case also d/w PMD by Dr. Sauer, notified about lung nodule On rapaflo at home  - c/w Flomax for hospital stay

## 2018-07-23 NOTE — PROGRESS NOTE ADULT - PROBLEM SELECTOR PLAN 1
EF <30%, appears euvolemic  -c/w losartan  -BB on hold pending ICD  -c/w lasix  -appreciate cards c/s  -ICD pending today EF <30%, appears euvolemic, troponin elevation likely 2/2 to this;  pt s/p cardiac cath nonobstructive CAD but low EF; TTE noted,  severe global left ventricular systolic dysfunction  -c/w asa/plavix, will f/u with cards re: need for DAPT  -c/w losartan  -BB on hold pending ICD  -c/w lasix  -appreciate cards c/s  -ICD pending today  -check lipid panel, merits statin for CAD

## 2018-07-23 NOTE — PROGRESS NOTE ADULT - PROBLEM SELECTOR PLAN 9
c/w Advair's therapeutic interchange and RENÉ. No respiratory distress or wheezing at this time SQH  PT no home needs  dispo planning, will need to f/u with cards re: post-procedural monitoring

## 2018-07-23 NOTE — PROGRESS NOTE ADULT - PROBLEM SELECTOR PLAN 2
secondary to chronic heart failure, pt s/p cardiac cath  no CAD but low EF; TTE noted,  severe global left ventricular systolic dysfunction.  -c/w asa and plavix   -c/w low dose lasix   - c/w losartan   - BB on hold for block and dizziness, can likely start s/p ICD placement, will fu with cards/EP Non-toxic appearing, pleasant without nausea/vomiting, and CT Abdomen/Pelvis negative for acute intracolonic pathology. Also no SIRS criteria. Likely patient with acid reflux/indigestion.   - c/w Maalox and Pepcid for epigastric discomfort and continue to monitor  - pain has resolved

## 2018-07-23 NOTE — CHART NOTE - NSCHARTNOTEFT_GEN_A_CORE
Type of Procedure: ICD implant  Licensed independent practitioner: Franchesca Richardson MD  Assistant: None  Description of procedure:   After informed consent was obtained, the patient was brought to the EP laboratory in the fasting state and prepped and draped in the usual sterile manner.  Ancef 2gm IV was administered prophylactically.   Local anesthetic was delivered to the left pectoral region and an incision was made in the left deltopectoral groove. The incision was extended inward using blunt dissection and the left cephalic vein was identified. The vein was cannulated and a retaining wire was placed in the IVC under fluoroscopy.  A 9F peel-away sheath was placed in the cephalic vein and a MDT single coil active fixation lead was advanced to the RV septum under fluoroscopic guidance. Ventricular sensing and pacing thresholds were checked and were good.  Pacing at 10V was performed from the ventricular lead without diaphragmatic or intercostal capture. Next a 7Fr sheath was inserted over the wire in the cephalic vein. A MDT 52cm lead was positioned in the RAA. Atrial sensing and pacing thresholds were good. The leads were sutured to the underlying pectoralis muscle using 2-0 Ethibond suture.  Final pacing and sensing thresholds were checked and were adequate. A subcutaneous pocket was then created using blunt dissection and it was copiously irrigated with a saline solution containing polymixin. The leads were connected to a MDT generator and the entire system was implanted into the pocket.   The subcutaneous tissues were then closed with a layer of interrupted 2-0 vicryl suture and a running 2-0 vicryl stitch.  The skin was closed with a running 4-0 vicryl subcuticular stitch.  Steri strips were placed over the incision.  The wound was covered with a dry sterile dressing. The patient tolerated the procedure well and left the laboratory in good condition.  Conscious sedation was provided and appropriate monitoring was performed by members of the EP nursing staff.    Findings of procedure: None  Estimated blood loss: <10cc  Specimen removed: N/A  Preoperative Dx: Non-infarct related cardiomyopathy  Postoperative Dx: Non-infarct related cardiomyopathy  Complications: None  Anesthesia type: Conscious sedation

## 2018-07-23 NOTE — PROGRESS NOTE ADULT - PROBLEM SELECTOR PLAN 3
Non-toxic appearing, pleasant without nausea/vomiting, and CT Abdomen/Pelvis negative for acute intracolonic pathology. Also no SIRS criteria. Likely patient with acid reflux/indigestion.   - c/w Maalox and Pepcid for epigastric discomfort and continue to monitor  - pain has resolved BP better controlled   c/w home medications as previously prescribed.

## 2018-07-23 NOTE — CHART NOTE - NSCHARTNOTEFT_GEN_A_CORE
Patient is seen and examined. Denies chest pain, SOB, palpitations or dizziness. NPO p MN for possible ICD implantation today. Discussed thoroughly with patient and daughter concerning all aspects of ICD therapy. We discussed the data supporting ICD therapy. We discussed risks and outcomes of ICD implantation and living with ICD. Also discussed management of ICD therapy throughout life, including deactivation of ICD.  All questions were answered and shared decision was made to proceed with ICD therapy as in patient. Patient signed the consent and he and his daughter demonstrates understanding of the risks/benefits and alternatives of ICD implantation. Assessment: please   see EP preprocedure assessment record.

## 2018-07-23 NOTE — PROGRESS NOTE ADULT - SUBJECTIVE AND OBJECTIVE BOX
Patient is a 83y old  Male who presents with a chief complaint of Abdominal Discomfort    SUBJECTIVE / OVERNIGHT EVENTS:    Seen at 9 am today  Denies CP, SOB, suffers from chronic post-nasal drip  Denies asthma symptoms, no chest tightness  No recurrence of dizziness  Reports lives with wife  Didn't know he had HF  BM yesterday, tolerated diet prior to NPO, no n/v  Amenable to ICD today    Tele: no events, SR    MEDICATIONS  (STANDING):  aspirin enteric coated 81 milliGRAM(s) Oral daily  buDESOnide 160 MICROgram(s)/formoterol 4.5 MICROgram(s) Inhaler 2 Puff(s) Inhalation two times a day  clopidogrel Tablet 75 milliGRAM(s) Oral <User Schedule>  furosemide    Tablet 20 milliGRAM(s) Oral daily  heparin  Injectable 5000 Unit(s) SubCutaneous every 8 hours  levothyroxine 75 MICROGram(s) Oral daily  losartan 50 milliGRAM(s) Oral daily  pantoprazole    Tablet 40 milliGRAM(s) Oral before breakfast    MEDICATIONS  (PRN):  acetaminophen   Tablet. 650 milliGRAM(s) Oral every 6 hours PRN mild and moderate pain  ALBUTerol    90 MICROgram(s) HFA Inhaler 2 Puff(s) Inhalation every 4 hours PRN Shortness of Breath and/or Wheezing  aluminum hydroxide/magnesium hydroxide/simethicone Suspension 30 milliLiter(s) Oral every 4 hours PRN Dyspepsia    T(C): 36.6 (07-23-18 @ 13:05), Max: 36.7 (07-22-18 @ 13:43)  HR: 71 (07-23-18 @ 13:05) (71 - 78)  BP: 120/80 (07-23-18 @ 13:05) (120/80 - 127/73)  RR: 18 (07-23-18 @ 13:05) (18 - 18)  SpO2: 96% (07-23-18 @ 13:05) (95% - 99%)    I&O's Summary    23 Jul 2018 07:01  -  23 Jul 2018 13:09  --------------------------------------------------------  IN: 0 mL / OUT: 50 mL / NET: -50 mL    PHYSICAL EXAM:  GENERAL: NAD, well-developed  HEAD:  Atraumatic, Normocephalic  EYES: EOMI, PERRLA, conjunctiva and sclera clear  NECK: Supple,   CHEST/LUNG: Clear to auscultation bilaterally; No wheeze  HEART: Regular rate and rhythm;   ABDOMEN: Soft, Nontender, Nondistended; Bowel sounds present  EXTREMITIES:  2+ Peripheral Pulses, No clubbing, cyanosis, or edema  PSYCH: AAOx3  NEUROLOGY: non-focal  SKIN: No rashes or lesions    LABS:                        14.0   7.25  )-----------( 334      ( 23 Jul 2018 05:20 )             41.5     07-23    134<L>  |  97<L>  |  17  ----------------------------<  84  3.8   |  23  |  0.70    Ca    8.7      23 Jul 2018 05:20  Phos  3.0     07-23  Mg     2.0     07-23      Consultant(s) Notes Reviewed:  cards, EP    Care Discussed with Consultants/Other Providers:

## 2018-07-23 NOTE — PROGRESS NOTE ADULT - SUBJECTIVE AND OBJECTIVE BOX
Subjective: Patient seen and examined. No new events except as noted.     SUBJECTIVE/ROS:  No chest pain, dyspnea, palpitation, or dizziness.       MEDICATIONS:  MEDICATIONS  (STANDING):  aspirin enteric coated 81 milliGRAM(s) Oral daily  buDESOnide 160 MICROgram(s)/formoterol 4.5 MICROgram(s) Inhaler 2 Puff(s) Inhalation two times a day  clopidogrel Tablet 75 milliGRAM(s) Oral <User Schedule>  furosemide    Tablet 20 milliGRAM(s) Oral daily  heparin  Injectable 5000 Unit(s) SubCutaneous every 8 hours  levothyroxine 75 MICROGram(s) Oral daily  losartan 50 milliGRAM(s) Oral daily  pantoprazole    Tablet 40 milliGRAM(s) Oral before breakfast      PHYSICAL EXAM:  T(C): 36.5 (07-23-18 @ 05:23), Max: 36.7 (07-22-18 @ 13:43)  HR: 75 (07-23-18 @ 05:23) (75 - 78)  BP: 122/81 (07-23-18 @ 05:23) (121/73 - 127/73)  RR: 18 (07-23-18 @ 05:23) (18 - 18)  SpO2: 95% (07-23-18 @ 05:23) (95% - 99%)  Wt(kg): --  I&O's Summary    23 Jul 2018 07:01  -  23 Jul 2018 11:16  --------------------------------------------------------  IN: 0 mL / OUT: 50 mL / NET: -50 mL          JVP: Normal  Neck: supple  Lung: clear   CV: S1 S2 , Murmur:  Abd: soft  Ext: No edema  neuro: Awake / alert  Psych: flat affect  Skin: normal       LABS/DATA:    CARDIAC MARKERS:                                14.0   7.25  )-----------( 334      ( 23 Jul 2018 05:20 )             41.5     07-23    134<L>  |  97<L>  |  17  ----------------------------<  84  3.8   |  23  |  0.70    Ca    8.7      23 Jul 2018 05:20  Phos  3.0     07-23  Mg     2.0     07-23      proBNP:   Lipid Profile:   HgA1c:   TSH:     TELE:  EKG:

## 2018-07-23 NOTE — PROGRESS NOTE ADULT - PROBLEM SELECTOR PLAN 6
Mild hyponatremia, possibly 2/2 lasix  -continue to monitor RUL 1.1 cm and 0.4 mm nodule noted on CT chest  repeat CT in 3-6 months recommended  patient and his wife informed   case also d/w PMD by Dr. Sauer, notified about lung nodule

## 2018-07-23 NOTE — PROGRESS NOTE ADULT - PROBLEM SELECTOR PLAN 8
On rapaflo at home  - c/w Flomax for hospital stay c/w Advair's therapeutic interchange and RENÉ. No respiratory distress or wheezing at this time

## 2018-07-24 VITALS
OXYGEN SATURATION: 99 % | HEART RATE: 78 BPM | DIASTOLIC BLOOD PRESSURE: 69 MMHG | TEMPERATURE: 97 F | SYSTOLIC BLOOD PRESSURE: 100 MMHG | RESPIRATION RATE: 17 BRPM

## 2018-07-24 LAB
BUN SERPL-MCNC: 18 MG/DL — SIGNIFICANT CHANGE UP (ref 7–23)
CALCIUM SERPL-MCNC: 8.6 MG/DL — SIGNIFICANT CHANGE UP (ref 8.4–10.5)
CHLORIDE SERPL-SCNC: 98 MMOL/L — SIGNIFICANT CHANGE UP (ref 98–107)
CO2 SERPL-SCNC: 24 MMOL/L — SIGNIFICANT CHANGE UP (ref 22–31)
CREAT SERPL-MCNC: 0.74 MG/DL — SIGNIFICANT CHANGE UP (ref 0.5–1.3)
GLUCOSE SERPL-MCNC: 95 MG/DL — SIGNIFICANT CHANGE UP (ref 70–99)
POTASSIUM SERPL-MCNC: 4.4 MMOL/L — SIGNIFICANT CHANGE UP (ref 3.5–5.3)
POTASSIUM SERPL-SCNC: 4.4 MMOL/L — SIGNIFICANT CHANGE UP (ref 3.5–5.3)
SODIUM SERPL-SCNC: 136 MMOL/L — SIGNIFICANT CHANGE UP (ref 135–145)

## 2018-07-24 PROCEDURE — 99239 HOSP IP/OBS DSCHRG MGMT >30: CPT

## 2018-07-24 RX ORDER — FLUTICASONE PROPIONATE 50 MCG
1 SPRAY, SUSPENSION NASAL
Qty: 0 | Refills: 0 | COMMUNITY
Start: 2018-07-24

## 2018-07-24 RX ORDER — FUROSEMIDE 40 MG
1 TABLET ORAL
Qty: 30 | Refills: 0 | OUTPATIENT
Start: 2018-07-24 | End: 2018-08-22

## 2018-07-24 RX ORDER — METOPROLOL TARTRATE 50 MG
0.5 TABLET ORAL
Qty: 15 | Refills: 0 | OUTPATIENT
Start: 2018-07-24 | End: 2018-08-22

## 2018-07-24 RX ORDER — ATORVASTATIN CALCIUM 80 MG/1
1 TABLET, FILM COATED ORAL
Qty: 0 | Refills: 0 | COMMUNITY
Start: 2018-07-24

## 2018-07-24 RX ORDER — FUROSEMIDE 40 MG
1 TABLET ORAL
Qty: 0 | Refills: 0 | COMMUNITY
Start: 2018-07-24

## 2018-07-24 RX ORDER — CLOPIDOGREL BISULFATE 75 MG/1
1 TABLET, FILM COATED ORAL
Qty: 0 | Refills: 0 | COMMUNITY

## 2018-07-24 RX ORDER — METOPROLOL TARTRATE 50 MG
12.5 TABLET ORAL DAILY
Qty: 0 | Refills: 0 | Status: DISCONTINUED | OUTPATIENT
Start: 2018-07-24 | End: 2018-07-24

## 2018-07-24 RX ADMIN — Medication 75 MICROGRAM(S): at 04:54

## 2018-07-24 RX ADMIN — Medication 650 MILLIGRAM(S): at 00:00

## 2018-07-24 RX ADMIN — Medication 650 MILLIGRAM(S): at 05:30

## 2018-07-24 RX ADMIN — Medication 1 SPRAY(S): at 06:59

## 2018-07-24 RX ADMIN — Medication 650 MILLIGRAM(S): at 14:30

## 2018-07-24 RX ADMIN — Medication 20 MILLIGRAM(S): at 04:54

## 2018-07-24 RX ADMIN — Medication 650 MILLIGRAM(S): at 15:30

## 2018-07-24 RX ADMIN — Medication 81 MILLIGRAM(S): at 14:35

## 2018-07-24 RX ADMIN — LOSARTAN POTASSIUM 50 MILLIGRAM(S): 100 TABLET, FILM COATED ORAL at 04:54

## 2018-07-24 RX ADMIN — Medication 650 MILLIGRAM(S): at 04:56

## 2018-07-24 RX ADMIN — BUDESONIDE AND FORMOTEROL FUMARATE DIHYDRATE 2 PUFF(S): 160; 4.5 AEROSOL RESPIRATORY (INHALATION) at 14:35

## 2018-07-24 RX ADMIN — Medication 100 MILLIGRAM(S): at 06:58

## 2018-07-24 NOTE — PROGRESS NOTE ADULT - ASSESSMENT
84yo M with PMHx inclusive of HTN, prostate Ca s/p RT, BPH, asthma, admitted with NSTEMI. Cardiac catheterization revealed non obstructive CAD. Echocardiogram shows severe global LV systolic dysfunction with EF 29%. Patient had episode of sinus tachycardia @ 115 bpm followed by transient 2:1 AV block while trying to get out of bed. He reports feeling like losing balance while trying to stand up. Patient had normal AV conduction at 115 bpm prior to the episode. Episode of transient 2:1 AV block when trying to get OOB. There was no significant P-P prolongation so unlikely vagally mediated. Also, patient has severe global LV dysfunction and would require BB for optimal medical management. Patient is s/p ICD implantation for primary prevention. Device site clean, dry and intact with no signs of bleeding or hematoma. Device teaching given to patient and daughter and they demonstrate understanding of the instructions. CXR shows no pneumothorax.  - Continue optimal medical management with ARB and start beta blocker therapy  - May D/C from P standpoint  - F/U with device clinic on 8/8/18 @ 2pm
83M hx of Asthma, HTN, Hypothyroidism, BPH, Prostate CA s/p RT being admitted for abdominal discomfort.
84 yo ambulatory M with asthma, HTN, Hypothyroidism, BPH, Prostate CA s/p RT being admitted for abdominal discomfort; found to have symptomatic 2:1 AV block and chronic systolic HF pending ICD placement today.
elevated Trop  due to CHF  low EF on cath  no significant CAD  cont low dose lasix  off BB for now  dizziness correlates with episode of 2:1 avb  ep eval for ICD/PPM
elevated Trop  due to CHF  low EF on cath  no significant CAD  cont low dose lasix  off BB for now  dizziness correlates with episode of 2:1 avb  plan for dual chamber ICD  pt agrees with above plan with my discussion
elevated Trop  due to CHF  low EF on cath  no significant CAD  cont low dose lasix  off BB for now  dizziness correlates with episode of 2:1 avb  plan for dual chamber ICD  pt agrees with above plan with my discussion
elevated Trop  due to CHF  low EF on cath  no significant CAD  cont low dose lasix  off BB for now  dizziness correlates with episode of 2:1 avb  plan for dual chamber ICD today   pt agrees with above plan with my discussion
elevated Trop  due to CHF  low EF on cath  no significant CAD  cont low dose lasix  off BB for now  dizziness correlates with episode of 2:1 avb  s/p dual chamber ICD  fu with EP   ? strips of nonsensing on monitor
elevated Trop  due to CHF  low EF on cath  no significant CAD  will start low dose lasix  obtain echo  off BB given low BP
83M hx of Asthma, HTN, Hypothyroidism, BPH, Prostate CA s/p RT being admitted for abdominal discomfort.
83M hx of Asthma, HTN, Hypothyroidism, BPH, Prostate CA s/p RT being admitted for abdominal discomfort.
82 yo ambulatory M with asthma, HTN, Hypothyroidism, BPH, Prostate CA s/p RT being admitted for abdominal discomfort; found to have symptomatic 2:1 AV block and chronic systolic HF pending ICD placement 7/23 s/p observation, pending dc home today.

## 2018-07-24 NOTE — PROGRESS NOTE ADULT - PROBLEM SELECTOR PLAN 6
RUL 1.1 cm and 0.4 mm nodule noted on CT chest  repeat CT in 3-6 months recommended  patient and his wife informed   case also d/w PMD by Dr. Sauer, notified about lung nodule

## 2018-07-24 NOTE — DIETITIAN INITIAL EVALUATION ADULT. - NS AS NUTRI INTERV MEALS SNACK
Other (specify)/1. Encourage & assist Pt with meals; Monitor PO diet tolerance; Honor food preferences;               2, Monitor labs, standing weights, hydration status;/Diets modified for specific foods and ingredients

## 2018-07-24 NOTE — PROGRESS NOTE ADULT - SUBJECTIVE AND OBJECTIVE BOX
Patient is a 83y old  Male who presents with a chief complaint of Abdominal Discomfort    SUBJECTIVE / OVERNIGHT EVENTS:    Seen at ~900 am, daughter present    No complaints today, no CP, no SOB, no f/c  No nausea/vomiting  Tolerating diet  Has not walked recent  Mild pain at ICD site, no dizziness, no bleeding    Tele: Artifact    MEDICATIONS  (STANDING):  aspirin enteric coated 81 milliGRAM(s) Oral daily  atorvastatin 20 milliGRAM(s) Oral at bedtime  buDESOnide 160 MICROgram(s)/formoterol 4.5 MICROgram(s) Inhaler 2 Puff(s) Inhalation two times a day  fluticasone propionate 50 MICROgram(s)/spray Nasal Spray 1 Spray(s) Both Nostrils two times a day  furosemide    Tablet 20 milliGRAM(s) Oral daily  levothyroxine 75 MICROGram(s) Oral daily  losartan 50 milliGRAM(s) Oral daily  metoprolol succinate ER 12.5 milliGRAM(s) Oral daily    MEDICATIONS  (PRN):  acetaminophen   Tablet. 650 milliGRAM(s) Oral every 6 hours PRN mild and moderate pain  ALBUTerol    90 MICROgram(s) HFA Inhaler 2 Puff(s) Inhalation every 4 hours PRN Shortness of Breath and/or Wheezing  aluminum hydroxide/magnesium hydroxide/simethicone Suspension 30 milliLiter(s) Oral every 4 hours PRN Dyspepsia    T(C): 36.4 (07-24-18 @ 04:51), Max: 36.4 (07-24-18 @ 04:51)  HR: 75 (07-24-18 @ 04:51) (75 - 81)  BP: 135/86 (07-24-18 @ 04:51) (123/81 - 135/86)  RR: 18 (07-24-18 @ 04:51) (18 - 18)  SpO2: 95% (07-24-18 @ 04:51) (95% - 95%)    23 Jul 2018 07:01  -  24 Jul 2018 07:00  --------------------------------------------------------  IN: 0 mL / OUT: 350 mL / NET: -350 mL    PHYSICAL EXAM:  GENERAL: NAD, well-developed  HEAD:  Atraumatic, Normocephalic  EYES: EOMI, PERRLA, conjunctiva and sclera clear  NECK: Supple,   CHEST/LUNG: Clear to auscultation bilaterally; No wheeze  HEART: Regular rate and rhythm;   ABDOMEN: Soft, Nontender, Nondistended; Bowel sounds present  EXTREMITIES:  2+ Peripheral Pulses, No clubbing, cyanosis, or edema  PSYCH: AAOx3  NEUROLOGY: non-focal  SKIN: No rashes or lesions, ICD dressing c/d/i    LABS:                        14.0   7.25  )-----------( 334      ( 23 Jul 2018 05:20 )             41.5     07-24    136  |  98  |  18  ----------------------------<  95  4.4   |  24  |  0.74    Ca    8.6      24 Jul 2018 05:30  Phos  3.0     07-23  Mg     2.0     07-23      Consultant(s) Notes Reviewed:  EP, cards       Care Discussed with Consultants/Other Providers: Dr. Clemons, Dr. Richardson

## 2018-07-24 NOTE — PROGRESS NOTE ADULT - PROVIDER SPECIALTY LIST ADULT
Cardiology
Electrophysiology
Hospitalist
Electrophysiology
Hospitalist

## 2018-07-24 NOTE — PROGRESS NOTE ADULT - SUBJECTIVE AND OBJECTIVE BOX
Subjective: Patient seen and examined. No new events except as noted.     SUBJECTIVE/ROS:    s/p ppm/icd    MEDICATIONS:  MEDICATIONS  (STANDING):  aspirin enteric coated 81 milliGRAM(s) Oral daily  atorvastatin 20 milliGRAM(s) Oral at bedtime  buDESOnide 160 MICROgram(s)/formoterol 4.5 MICROgram(s) Inhaler 2 Puff(s) Inhalation two times a day  fluticasone propionate 50 MICROgram(s)/spray Nasal Spray 1 Spray(s) Both Nostrils two times a day  furosemide    Tablet 20 milliGRAM(s) Oral daily  levothyroxine 75 MICROGram(s) Oral daily  losartan 50 milliGRAM(s) Oral daily      PHYSICAL EXAM:  T(C): 36.4 (07-24-18 @ 04:51), Max: 36.6 (07-23-18 @ 13:05)  HR: 75 (07-24-18 @ 04:51) (71 - 81)  BP: 135/86 (07-24-18 @ 04:51) (120/80 - 135/86)  RR: 18 (07-24-18 @ 04:51) (18 - 18)  SpO2: 95% (07-24-18 @ 04:51) (95% - 96%)  Wt(kg): --  I&O's Summary    23 Jul 2018 07:01  -  24 Jul 2018 07:00  --------------------------------------------------------  IN: 0 mL / OUT: 50 mL / NET: -50 mL          JVP: Normal  Neck: supple  Lung: clear   CV: S1 S2 , Murmur:  Abd: soft  Ext: No edema  neuro: Awake / alert  Psych: flat affect  Skin: normal       LABS/DATA:    CARDIAC MARKERS:                                14.0   7.25  )-----------( 334      ( 23 Jul 2018 05:20 )             41.5     07-24    136  |  98  |  18  ----------------------------<  95  4.4   |  24  |  0.74    Ca    8.6      24 Jul 2018 05:30  Phos  3.0     07-23  Mg     2.0     07-23      proBNP:   Lipid Profile:   HgA1c:   TSH:     TELE:  EKG:

## 2018-07-24 NOTE — PROGRESS NOTE ADULT - PROBLEM SELECTOR PLAN 9
[facility]  PT no home needs  dispo planning, will need to f/u with cards and EP as OP  Care d/w daughter and patient at bedside  EP NP came to re-explain ICD  dispo home today  dispo time 41 min

## 2018-07-24 NOTE — DIETITIAN INITIAL EVALUATION ADULT. - PROBLEM SELECTOR PLAN 1
Non-toxic appearing, pleasant without nausea/vomiting, and CT Abdomen/Pelvis negative for acute intracolonic pathology. Also no SIRS criteria. Likely patient with acid reflux/indigestion. Would c/w Maalox and Pepcid for epigastric discomfort and continue to monitor. Angiogram workup was negative for acute vascular issues.  - Concern in ER for ACS. HsT were 51 and 47 respectively, Heart Score = 4. Patient admitted for observation will reassess in 24 h after interventions listed above. Currently doesn't appear in ACS given he is comfortable without changes in EKG.

## 2018-07-24 NOTE — PROGRESS NOTE ADULT - PROBLEM SELECTOR PROBLEM 1
Chronic systolic heart failure
Elevated troponin I level
Chronic systolic heart failure

## 2018-07-24 NOTE — PROGRESS NOTE ADULT - PROBLEM SELECTOR PLAN 1
EF <30%, appears euvolemic, troponin elevation likely 2/2 to this;  pt s/p cardiac cath nonobstructive CAD but low EF; TTE noted,  severe global left ventricular systolic dysfunction; ICD placed on 7/23  - c/w asa alone, patient on plavix two times per week as OP for ?CVA   - c/w losartan  - start low dose metoprolol XL 12.5 daily  - c/w lasix  - c/w atorvastatin 20 mg  - appreciate cards c/s

## 2018-07-24 NOTE — DIETITIAN INITIAL EVALUATION ADULT. - OTHER INFO
Pt seen for Length of stay. Pt 82 yo male appears alert, oriented. Per Pt his appetite "okay"; No chew/swallow problem voiced; no nausea/vomiting/diarrhea reported @ present. At home Pt's spouse usually prepares food for him reported. No weight loss or weight changes voiced. Pt's diet order includes DASH/TLC (cholesterol and sodium restricted) restriction; better food choices discussed with Pt. RDN remains available, Pt made aware.

## 2018-07-24 NOTE — PROGRESS NOTE ADULT - SUBJECTIVE AND OBJECTIVE BOX
Patient is seen and examined. Denies chest pain, SOB, palpitations or dizziness.     PAST MEDICAL & SURGICAL HISTORY:  Hypothyroid  BPH (benign prostatic hypertrophy) with urinary retention  Prostate Cancer: s/p RT  Asthma  Hypertension  Hypertension  2006 turp  Adrenal Gland Cyst  CVA (Cerebral Infarction): pt unsure if it was qa stroke or not lost rt peripheral vision which returned  LEFT CATARACT 5/11/10  Bilat Ing Hernia: 2009      MEDICATIONS  (STANDING):  aspirin enteric coated 81 milliGRAM(s) Oral daily  atorvastatin 20 milliGRAM(s) Oral at bedtime  buDESOnide 160 MICROgram(s)/formoterol 4.5 MICROgram(s) Inhaler 2 Puff(s) Inhalation two times a day  fluticasone propionate 50 MICROgram(s)/spray Nasal Spray 1 Spray(s) Both Nostrils two times a day  furosemide    Tablet 20 milliGRAM(s) Oral daily  levothyroxine 75 MICROGram(s) Oral daily  losartan 50 milliGRAM(s) Oral daily    MEDICATIONS  (PRN):  acetaminophen   Tablet. 650 milliGRAM(s) Oral every 6 hours PRN mild and moderate pain  ALBUTerol    90 MICROgram(s) HFA Inhaler 2 Puff(s) Inhalation every 4 hours PRN Shortness of Breath and/or Wheezing  aluminum hydroxide/magnesium hydroxide/simethicone Suspension 30 milliLiter(s) Oral every 4 hours PRN Dyspepsia    Vital Signs Last 24 Hrs  T(C): 36.4 (24 Jul 2018 04:51), Max: 36.6 (23 Jul 2018 13:05)  T(F): 97.5 (24 Jul 2018 04:51), Max: 97.8 (23 Jul 2018 13:05)  HR: 75 (24 Jul 2018 04:51) (71 - 81)  BP: 135/86 (24 Jul 2018 04:51) (120/80 - 135/86)  BP(mean): --  RR: 18 (24 Jul 2018 04:51) (18 - 18)  SpO2: 95% (24 Jul 2018 04:51) (95% - 96%)    INTERPRETATION OF TELEMETRY: Sinus rhythm with occ. V pacing , HR 70s-80s    LABS:                        14.0   7.25  )-----------( 334      ( 23 Jul 2018 05:20 )             41.5     07-24    136  |  98  |  18  ----------------------------<  95  4.4   |  24  |  0.74    Ca    8.6      24 Jul 2018 05:30  Phos  3.0     07-23  Mg     2.0     07-23    I&O's Summary    23 Jul 2018 07:01  -  24 Jul 2018 07:00  --------------------------------------------------------  IN: 0 mL / OUT: 350 mL / NET: -350 mL      PHYSICAL EXAM:    GENERAL: In no apparent distress, well nourished, and hydrated.  HEAD:  Atraumatic, Normocephalic  HEART: Regular rate and rhythm; No murmurs, rubs, or gallops.  PULMONARY: Clear to auscultation and percussion.  No rales, wheezing, or rhonchi bilaterally.  ABDOMEN: Soft, Nontender, Nondistended; Bowel sounds present  EXTREMITIES:  2+ Peripheral Pulses, No clubbing, cyanosis, or edema

## 2018-07-24 NOTE — PROGRESS NOTE ADULT - PROBLEM SELECTOR PLAN 2
Non-toxic appearing, pleasant without nausea/vomiting, and CT Abdomen/Pelvis negative for acute intracolonic pathology. Also no SIRS criteria. Likely patient with acid reflux/indigestion.   - c/w Maalox and Pepcid for epigastric discomfort and continue to monitor  - pain has resolved

## 2018-08-03 ENCOUNTER — APPOINTMENT (OUTPATIENT)
Dept: ELECTROPHYSIOLOGY | Facility: CLINIC | Age: 83
End: 2018-08-03
Payer: MEDICARE

## 2018-08-03 PROCEDURE — 99024 POSTOP FOLLOW-UP VISIT: CPT

## 2018-08-03 RX ORDER — TAMSULOSIN HYDROCHLORIDE 0.4 MG/1
0.4 CAPSULE ORAL
Qty: 90 | Refills: 0 | Status: DISCONTINUED | COMMUNITY
Start: 2017-05-09 | End: 2018-08-03

## 2018-08-03 RX ORDER — CLOPIDOGREL BISULFATE 75 MG/1
75 TABLET, FILM COATED ORAL
Qty: 30 | Refills: 5 | Status: DISCONTINUED | COMMUNITY
Start: 2017-09-08 | End: 2018-08-03

## 2018-08-06 ENCOUNTER — RX RENEWAL (OUTPATIENT)
Age: 83
End: 2018-08-06

## 2018-08-08 ENCOUNTER — RX RENEWAL (OUTPATIENT)
Age: 83
End: 2018-08-08

## 2018-08-08 ENCOUNTER — APPOINTMENT (OUTPATIENT)
Age: 83
End: 2018-08-08

## 2018-08-08 ENCOUNTER — APPOINTMENT (OUTPATIENT)
Dept: ELECTROPHYSIOLOGY | Facility: CLINIC | Age: 83
End: 2018-08-08

## 2018-08-08 VITALS
HEART RATE: 70 BPM | BODY MASS INDEX: 23.72 KG/M2 | RESPIRATION RATE: 16 BRPM | HEIGHT: 73 IN | WEIGHT: 179 LBS | DIASTOLIC BLOOD PRESSURE: 70 MMHG | SYSTOLIC BLOOD PRESSURE: 122 MMHG | TEMPERATURE: 97.6 F

## 2018-08-08 DIAGNOSIS — S69.90XS UNSPECIFIED INJURY OF UNSPECIFIED WRIST, HAND AND FINGER(S), SEQUELA: ICD-10-CM

## 2018-08-08 DIAGNOSIS — R10.30 LOWER ABDOMINAL PAIN, UNSPECIFIED: ICD-10-CM

## 2018-08-08 DIAGNOSIS — R42 DIZZINESS AND GIDDINESS: ICD-10-CM

## 2018-08-08 DIAGNOSIS — R33.8 OTHER RETENTION OF URINE: ICD-10-CM

## 2018-08-08 DIAGNOSIS — N39.0 URINARY TRACT INFECTION, SITE NOT SPECIFIED: ICD-10-CM

## 2018-08-08 DIAGNOSIS — R19.4 CHANGE IN BOWEL HABIT: ICD-10-CM

## 2018-08-08 RX ORDER — CIPROFLOXACIN HYDROCHLORIDE 500 MG/1
500 TABLET, FILM COATED ORAL TWICE DAILY
Qty: 6 | Refills: 0 | Status: COMPLETED | COMMUNITY
Start: 2017-10-30 | End: 2018-08-08

## 2018-08-08 RX ORDER — SULFAMETHOXAZOLE AND TRIMETHOPRIM 400; 80 MG/1; MG/1
400-80 TABLET ORAL
Qty: 30 | Refills: 2 | Status: COMPLETED | COMMUNITY
Start: 2017-09-12 | End: 2018-08-08

## 2018-08-08 RX ORDER — IPRATROPIUM BROMIDE 21 UG/1
0.03 SPRAY NASAL
Qty: 30 | Refills: 5 | Status: DISCONTINUED | COMMUNITY
Start: 2018-02-14 | End: 2018-08-08

## 2018-08-08 RX ORDER — METHYLPREDNISOLONE 4 MG/1
4 TABLET ORAL
Qty: 21 | Refills: 0 | Status: DISCONTINUED | COMMUNITY
Start: 2018-02-14

## 2018-08-08 RX ORDER — METHYLPREDNISOLONE 4 MG/1
4 TABLET ORAL
Qty: 21 | Refills: 1 | Status: COMPLETED | COMMUNITY
Start: 2018-02-14 | End: 2018-08-08

## 2018-08-08 RX ORDER — LOSARTAN POTASSIUM 50 MG/1
50 TABLET, FILM COATED ORAL
Qty: 1 | Refills: 3 | Status: DISCONTINUED | COMMUNITY
Start: 2017-07-21 | End: 2018-08-08

## 2018-08-09 ENCOUNTER — APPOINTMENT (OUTPATIENT)
Dept: ELECTROPHYSIOLOGY | Facility: CLINIC | Age: 83
End: 2018-08-09
Payer: MEDICARE

## 2018-08-09 PROCEDURE — 99024 POSTOP FOLLOW-UP VISIT: CPT

## 2018-08-10 ENCOUNTER — APPOINTMENT (OUTPATIENT)
Dept: INTERNAL MEDICINE | Facility: CLINIC | Age: 83
End: 2018-08-10
Payer: MEDICARE

## 2018-08-10 ENCOUNTER — LABORATORY RESULT (OUTPATIENT)
Age: 83
End: 2018-08-10

## 2018-08-10 VITALS
SYSTOLIC BLOOD PRESSURE: 90 MMHG | HEIGHT: 73 IN | OXYGEN SATURATION: 92 % | DIASTOLIC BLOOD PRESSURE: 60 MMHG | TEMPERATURE: 97.6 F | BODY MASS INDEX: 23.06 KG/M2 | WEIGHT: 174 LBS | HEART RATE: 76 BPM

## 2018-08-10 PROCEDURE — 99214 OFFICE O/P EST MOD 30 MIN: CPT | Mod: 25

## 2018-08-10 PROCEDURE — 36415 COLL VENOUS BLD VENIPUNCTURE: CPT

## 2018-08-10 RX ORDER — FUROSEMIDE 20 MG/1
20 TABLET ORAL DAILY
Refills: 0 | Status: DISCONTINUED | COMMUNITY
End: 2018-08-10

## 2018-08-10 NOTE — PHYSICAL EXAM
[No Acute Distress] : no acute distress [Well Nourished] : well nourished [Normal Outer Ear/Nose] : the outer ears and nose were normal in appearance [Normal Oropharynx] : the oropharynx was normal [No JVD] : no jugular venous distention [Supple] : supple [No Respiratory Distress] : no respiratory distress  [Clear to Auscultation] : lungs were clear to auscultation bilaterally [Normal Rate] : normal rate  [Regular Rhythm] : with a regular rhythm [de-identified] : trace edema

## 2018-08-10 NOTE — HISTORY OF PRESENT ILLNESS
[FreeTextEntry1] : recent hospital [de-identified] : Hospital stomach pain and vomiting and chf\par cath clean coronaries  ef 29(alcohol related)\par many new med\par got AICD for heart block\par feel well some lightheaded feeling when stand up\par history of asthma/copd controlled with advair

## 2018-08-10 NOTE — REVIEW OF SYSTEMS
[Fever] : no fever [Earache] : no earache [Nasal Discharge] : no nasal discharge [Chest Pain] : no chest pain [Orthopena] : no orthopnea

## 2018-08-10 NOTE — PLAN
[FreeTextEntry1] : s/p aicd/chf\par bp low on multiple med\par check blood\par next visit Dr Varela on Tuesday

## 2018-08-12 LAB
25(OH)D3 SERPL-MCNC: 47.5 NG/ML
BASOPHILS # BLD AUTO: 0.02 K/UL
BASOPHILS NFR BLD AUTO: 0.2 %
CHOLEST SERPL-MCNC: 165 MG/DL
CHOLEST/HDLC SERPL: 2.8 RATIO
DIGOXIN SERPL-MCNC: 0.4 NG/ML
EOSINOPHIL # BLD AUTO: 0.18 K/UL
EOSINOPHIL NFR BLD AUTO: 2.1 %
HBA1C MFR BLD HPLC: 5.8 %
HCT VFR BLD CALC: 42.5 %
HDLC SERPL-MCNC: 59 MG/DL
HGB BLD-MCNC: 14.2 G/DL
IMM GRANULOCYTES NFR BLD AUTO: 0.2 %
LDLC SERPL CALC-MCNC: 90 MG/DL
LYMPHOCYTES # BLD AUTO: 1.28 K/UL
LYMPHOCYTES NFR BLD AUTO: 15.2 %
MAN DIFF?: NORMAL
MCHC RBC-ENTMCNC: 31.8 PG
MCHC RBC-ENTMCNC: 33.4 GM/DL
MCV RBC AUTO: 95.3 FL
MONOCYTES # BLD AUTO: 0.59 K/UL
MONOCYTES NFR BLD AUTO: 7 %
NEUTROPHILS # BLD AUTO: 6.33 K/UL
NEUTROPHILS NFR BLD AUTO: 75.3 %
NT-PROBNP SERPL-MCNC: 644 PG/ML
PLATELET # BLD AUTO: 211 K/UL
RBC # BLD: 4.46 M/UL
RBC # FLD: 13 %
T4 FREE SERPL-MCNC: 1.5 NG/DL
T4 SERPL-MCNC: 9.5 UG/DL
TRIGL SERPL-MCNC: 82 MG/DL
TSH SERPL-ACNC: 4.41 UIU/ML
WBC # FLD AUTO: 8.42 K/UL

## 2018-08-20 NOTE — SOCIAL WORK INITIAL EVALUATION ADULT - ANTICIPATED OUTCOMES
----- Message from Jessica A Behrens, DO sent at 8/20/2018  5:37 AM CDT -----  Please call the patient and let her know that her uterus is normal size but does appear to have a large 9.9 cm mass arising from the top of the uterus which is likely consistent with a benign uterine fibroid. The ovaries were seen, and the right ovary contains a normal appearing 3.5 cm simple ovarian cyst. We can review this together at her follow up visit this week. Thanks, Martha  
Left message for pt to return call to [facility]    Patient has appointment scheduled on 8/23/2018  
Patient called back.  Reviewed below  Patient in agreement  Encounter closed  
home with no referrals

## 2018-09-26 ENCOUNTER — APPOINTMENT (OUTPATIENT)
Dept: ELECTROPHYSIOLOGY | Facility: CLINIC | Age: 83
End: 2018-09-26
Payer: MEDICARE

## 2018-09-26 PROCEDURE — 93283 PRGRMG EVAL IMPLANTABLE DFB: CPT

## 2018-10-23 ENCOUNTER — APPOINTMENT (OUTPATIENT)
Dept: INTERNAL MEDICINE | Facility: CLINIC | Age: 83
End: 2018-10-23

## 2018-11-14 ENCOUNTER — MEDICATION RENEWAL (OUTPATIENT)
Age: 83
End: 2018-11-14

## 2018-11-14 NOTE — ED ADULT NURSE NOTE - NS ED NURSE RECORD ANOTHER VITAL SIGN
Yes I personally performed the service described in the documentation recorded by the scribe in my presence, and it accurately and completely records my words and actions.

## 2018-11-26 ENCOUNTER — APPOINTMENT (OUTPATIENT)
Dept: INTERNAL MEDICINE | Facility: CLINIC | Age: 83
End: 2018-11-26
Payer: MEDICARE

## 2018-11-26 VITALS
HEART RATE: 85 BPM | HEIGHT: 73 IN | WEIGHT: 180 LBS | TEMPERATURE: 97.7 F | OXYGEN SATURATION: 96 % | SYSTOLIC BLOOD PRESSURE: 100 MMHG | DIASTOLIC BLOOD PRESSURE: 70 MMHG | BODY MASS INDEX: 23.86 KG/M2

## 2018-11-26 DIAGNOSIS — J45.909 UNSPECIFIED ASTHMA, UNCOMPLICATED: ICD-10-CM

## 2018-11-26 PROCEDURE — 99214 OFFICE O/P EST MOD 30 MIN: CPT | Mod: 25

## 2018-11-26 PROCEDURE — 36415 COLL VENOUS BLD VENIPUNCTURE: CPT

## 2018-11-26 RX ORDER — DIGOXIN 125 UG/1
125 TABLET ORAL DAILY
Refills: 0 | Status: DISCONTINUED | COMMUNITY
End: 2018-11-26

## 2018-11-26 NOTE — REVIEW OF SYSTEMS
[Fever] : no fever [Night Sweats] : no night sweats [Discharge] : no discharge [Vision Problems] : no vision problems [Chest Pain] : no chest pain [Orthopena] : no orthopnea [Shortness Of Breath] : shortness of breath [Wheezing] : wheezing [Cough] : cough

## 2018-11-26 NOTE — HISTORY OF PRESENT ILLNESS
[FreeTextEntry8] : Loose cough\par head congestion and rhinorhea clear phlegm\par sleeps in recliner\par out of advair 100/50 for several days

## 2018-11-26 NOTE — PLAN
[FreeTextEntry1] : Most likely exacerbation of copd/asthma\par will switch to advair 500/50\par no antibiotics at this time\par full blood assess thyroid lytes and pro bnp

## 2018-11-27 LAB
ALBUMIN SERPL ELPH-MCNC: 3.6 G/DL
ALP BLD-CCNC: 111 U/L
ALT SERPL-CCNC: 24 U/L
ANION GAP SERPL CALC-SCNC: 15 MMOL/L
AST SERPL-CCNC: 31 U/L
BASOPHILS # BLD AUTO: 0.02 K/UL
BASOPHILS NFR BLD AUTO: 0.3 %
BILIRUB SERPL-MCNC: 0.5 MG/DL
BUN SERPL-MCNC: 17 MG/DL
CALCIUM SERPL-MCNC: 9 MG/DL
CHLORIDE SERPL-SCNC: 100 MMOL/L
CHOLEST SERPL-MCNC: 130 MG/DL
CHOLEST/HDLC SERPL: 2.1 RATIO
CO2 SERPL-SCNC: 21 MMOL/L
CREAT SERPL-MCNC: 0.82 MG/DL
EOSINOPHIL # BLD AUTO: 0.1 K/UL
EOSINOPHIL NFR BLD AUTO: 1.5 %
GLUCOSE SERPL-MCNC: 74 MG/DL
HBA1C MFR BLD HPLC: 5.5 %
HCT VFR BLD CALC: 42.7 %
HDLC SERPL-MCNC: 61 MG/DL
HGB BLD-MCNC: 13.7 G/DL
IMM GRANULOCYTES NFR BLD AUTO: 0.3 %
LDLC SERPL CALC-MCNC: 57 MG/DL
LYMPHOCYTES # BLD AUTO: 0.91 K/UL
LYMPHOCYTES NFR BLD AUTO: 13.9 %
MAN DIFF?: NORMAL
MCHC RBC-ENTMCNC: 31.6 PG
MCHC RBC-ENTMCNC: 32.1 GM/DL
MCV RBC AUTO: 98.4 FL
MONOCYTES # BLD AUTO: 0.81 K/UL
MONOCYTES NFR BLD AUTO: 12.3 %
NEUTROPHILS # BLD AUTO: 4.71 K/UL
NEUTROPHILS NFR BLD AUTO: 71.7 %
NT-PROBNP SERPL-MCNC: 281 PG/ML
PLATELET # BLD AUTO: 264 K/UL
POTASSIUM SERPL-SCNC: 5.1 MMOL/L
PROT SERPL-MCNC: 6.4 G/DL
RBC # BLD: 4.34 M/UL
RBC # FLD: 13.9 %
SODIUM SERPL-SCNC: 136 MMOL/L
T4 SERPL-MCNC: 8.7 UG/DL
TRIGL SERPL-MCNC: 60 MG/DL
TSH SERPL-ACNC: 1.34 UIU/ML
WBC # FLD AUTO: 6.57 K/UL

## 2018-12-06 ENCOUNTER — OUTPATIENT (OUTPATIENT)
Dept: OUTPATIENT SERVICES | Facility: HOSPITAL | Age: 83
LOS: 1 days | End: 2018-12-06
Payer: MEDICARE

## 2018-12-06 ENCOUNTER — APPOINTMENT (OUTPATIENT)
Dept: RADIOLOGY | Facility: IMAGING CENTER | Age: 83
End: 2018-12-06
Payer: MEDICARE

## 2018-12-06 DIAGNOSIS — Z00.8 ENCOUNTER FOR OTHER GENERAL EXAMINATION: ICD-10-CM

## 2018-12-06 PROCEDURE — 72100 X-RAY EXAM L-S SPINE 2/3 VWS: CPT

## 2018-12-06 PROCEDURE — 73502 X-RAY EXAM HIP UNI 2-3 VIEWS: CPT

## 2018-12-06 PROCEDURE — 73502 X-RAY EXAM HIP UNI 2-3 VIEWS: CPT | Mod: 26,LT

## 2018-12-06 PROCEDURE — 72100 X-RAY EXAM L-S SPINE 2/3 VWS: CPT | Mod: 26

## 2018-12-07 ENCOUNTER — APPOINTMENT (OUTPATIENT)
Dept: INTERNAL MEDICINE | Facility: CLINIC | Age: 83
End: 2018-12-07

## 2019-01-07 ENCOUNTER — APPOINTMENT (OUTPATIENT)
Dept: ELECTROPHYSIOLOGY | Facility: CLINIC | Age: 84
End: 2019-01-07
Payer: MEDICARE

## 2019-01-07 PROCEDURE — 93295 DEV INTERROG REMOTE 1/2/MLT: CPT

## 2019-01-07 PROCEDURE — 93296 REM INTERROG EVL PM/IDS: CPT

## 2019-01-10 ENCOUNTER — APPOINTMENT (OUTPATIENT)
Dept: ELECTROPHYSIOLOGY | Facility: CLINIC | Age: 84
End: 2019-01-10

## 2019-01-18 ENCOUNTER — APPOINTMENT (OUTPATIENT)
Dept: UROLOGY | Facility: CLINIC | Age: 84
End: 2019-01-18
Payer: MEDICARE

## 2019-01-18 PROCEDURE — 51798 US URINE CAPACITY MEASURE: CPT

## 2019-01-18 PROCEDURE — 99213 OFFICE O/P EST LOW 20 MIN: CPT | Mod: 25

## 2019-01-18 NOTE — PHYSICAL EXAM
[General Appearance - Well Developed] : well developed [General Appearance - Well Nourished] : well nourished [Abdomen Soft] : soft [Abdomen Tenderness] : non-tender [Abdomen Mass (___ Cm)] : no abdominal mass palpated [Penis Abnormality] : normal circumcised penis [Urinary Bladder Findings] : the bladder was normal on palpation [Scrotum] : the scrotum was normal [Edema] : no peripheral edema [] : no respiratory distress [Exaggerated Use Of Accessory Muscles For Inspiration] : no accessory muscle use [Oriented To Time, Place, And Person] : oriented to person, place, and time [Normal Station and Gait] : the gait and station were normal for the patient's age [No Focal Deficits] : no focal deficits

## 2019-01-18 NOTE — H&P ADULT - NSHPREVIEWOFSYSTEMS_GEN_ALL_CORE
Detail Level: Simple Size Of Lesion: 1cm Size Of Lesion: 5cm Morphology Per Location (Optional): Dark brown slightly irregular Size Of Lesion: 3x2mm REVIEW OF SYSTEMS:    CONSTITUTIONAL: No weakness, fevers or chills  EYES/ENT: No visual changes;  No vertigo or throat pain   NECK: No pain or stiffness  RESPIRATORY: No cough, wheezing, hemoptysis; No shortness of breath  CARDIOVASCULAR: No chest pain or palpitations  GASTROINTESTINAL: (+) Abdominal discomfort, "gassy and acid burning." No nausea, vomiting, or hematemesis; No diarrhea or constipation. No melena or hematochezia.  GENITOURINARY: No dysuria, frequency or hematuria  NEUROLOGICAL: No numbness or weakness  SKIN: No itching, burning, rashes, or lesions   All other review of systems is negative unless indicated above.

## 2019-01-18 NOTE — HISTORY OF PRESENT ILLNESS
[FreeTextEntry1] : Patient following up for h/o UTIs. First one resulted in hospitalization at [facility] for urosepsis. He had a Flynn placed but not for obvious retention.He was on Flomax for a bit after passing TOV - he of off it and back to baseline. . At baseline he had intermittently slow stream but no straining, hesitancy or stop/start. Some frequency but no nocturia or incontinence. He had emergency cholecystectomy earlier this year and failed first TOV. He has h/o prostate cancer treated with XRT 2010.  \par \par I put him back on alpha blocker and was voiding well. A follow up  visit urine culture positive for Staph Aureus and treated.Then had another UTI treated in hospital. Voiding well now but has low grade fever and sensation - uncomfortable in urethra. No dysuria. He is on Rapaflo and f/u USG ok with no stones or PVR. Cysto oK.  I have noted higher PVRs and given cysto findings suspect some degree of neurogenic bladder. Has been on Hiprex and no UTIs since.\par ding well today - no dysuria or bladder pressure. Decent voiding habits and nocturia only 1 time. \par PVR today 180\par  \par  \par  \par  \par   \par

## 2019-01-22 LAB
BACTERIA UR CULT: NORMAL
BILIRUB UR QL STRIP: NEGATIVE
CLARITY UR: CLEAR
COLLECTION METHOD: NORMAL
GLUCOSE UR-MCNC: NEGATIVE
HCG UR QL: 0.2 EU/DL
HGB UR QL STRIP.AUTO: NEGATIVE
KETONES UR-MCNC: NEGATIVE
LEUKOCYTE ESTERASE UR QL STRIP: NEGATIVE
NITRITE UR QL STRIP: NEGATIVE
PH UR STRIP: 6.5
PROT UR STRIP-MCNC: NEGATIVE
SP GR UR STRIP: 1.02

## 2019-03-06 ENCOUNTER — RX RENEWAL (OUTPATIENT)
Age: 84
End: 2019-03-06

## 2019-03-27 ENCOUNTER — APPOINTMENT (OUTPATIENT)
Dept: ELECTROPHYSIOLOGY | Facility: CLINIC | Age: 84
End: 2019-03-27
Payer: MEDICARE

## 2019-03-27 PROCEDURE — 93283 PRGRMG EVAL IMPLANTABLE DFB: CPT

## 2019-04-08 ENCOUNTER — MEDICATION RENEWAL (OUTPATIENT)
Age: 84
End: 2019-04-08

## 2019-04-11 ENCOUNTER — RX RENEWAL (OUTPATIENT)
Age: 84
End: 2019-04-11

## 2019-04-14 ENCOUNTER — MEDICATION RENEWAL (OUTPATIENT)
Age: 84
End: 2019-04-14

## 2019-05-02 DIAGNOSIS — N39.0 URINARY TRACT INFECTION, SITE NOT SPECIFIED: ICD-10-CM

## 2019-05-05 LAB — BACTERIA UR CULT: NORMAL

## 2019-05-10 ENCOUNTER — APPOINTMENT (OUTPATIENT)
Dept: UROLOGY | Facility: CLINIC | Age: 84
End: 2019-05-10
Payer: MEDICARE

## 2019-05-10 PROCEDURE — 99213 OFFICE O/P EST LOW 20 MIN: CPT | Mod: 25

## 2019-05-10 PROCEDURE — 51798 US URINE CAPACITY MEASURE: CPT

## 2019-05-10 NOTE — HISTORY OF PRESENT ILLNESS
[FreeTextEntry1] : Patient following up for h/o UTIs. First one resulted in hospitalization at [facility] for urosepsis. He had a Flynn placed but not for obvious retention.He was on Flomax for a bit after passing TOV - he of off it and back to baseline. . At baseline he had intermittently slow stream but no straining, hesitancy or stop/start. Some frequency but no nocturia or incontinence. He had emergency cholecystectomy earlier this year and failed first TOV. He has h/o prostate cancer treated with XRT 2010.  \par \par I put him back on alpha blocker and was voiding well. A follow up  visit urine culture positive for Staph Aureus and treated.Then had another UTI treated in hospital. Voiding well now but has low grade fever and sensation - uncomfortable in urethra. No dysuria. He is on Rapaflo and f/u USG ok with no stones or PVR. Cysto oK.  I have noted higher PVRs and given cysto findings suspect some degree of neurogenic bladder. Has been on Hiprex and no UTIs since.\par Here as notes some "irritation" in his urethra - no bleeding. no change in his baseline. dropped off urine with his PCP who told him no UTI. \par \par  \par  \par  \par  \par   \par

## 2019-05-10 NOTE — ASSESSMENT
[FreeTextEntry1] : suspect his symptoms from not emptying so well\par will have him do timed voiding

## 2019-05-10 NOTE — PHYSICAL EXAM
[General Appearance - Well Developed] : well developed [Abdomen Soft] : soft [General Appearance - Well Nourished] : well nourished [Abdomen Tenderness] : non-tender [Abdomen Mass (___ Cm)] : no abdominal mass palpated [Abdomen Hernia] : no hernia was discovered [Penis Abnormality] : normal circumcised penis [Urinary Bladder Findings] : the bladder was normal on palpation [Scrotum] : the scrotum was normal [Edema] : no peripheral edema [Exaggerated Use Of Accessory Muscles For Inspiration] : no accessory muscle use [] : no respiratory distress [Oriented To Time, Place, And Person] : oriented to person, place, and time [Normal Station and Gait] : the gait and station were normal for the patient's age [No Focal Deficits] : no focal deficits

## 2019-06-03 ENCOUNTER — APPOINTMENT (OUTPATIENT)
Dept: NEUROLOGY | Facility: CLINIC | Age: 84
End: 2019-06-03
Payer: MEDICARE

## 2019-06-03 ENCOUNTER — RX RENEWAL (OUTPATIENT)
Age: 84
End: 2019-06-03

## 2019-06-03 VITALS
BODY MASS INDEX: 24.52 KG/M2 | WEIGHT: 185 LBS | SYSTOLIC BLOOD PRESSURE: 118 MMHG | HEIGHT: 73 IN | DIASTOLIC BLOOD PRESSURE: 65 MMHG

## 2019-06-03 DIAGNOSIS — I67.9 CEREBROVASCULAR DISEASE, UNSPECIFIED: ICD-10-CM

## 2019-06-03 DIAGNOSIS — I63.81 OTHER CEREBRAL INFARCTION DUE TO OCCLUSION OR STENOSIS OF SMALL ARTERY: ICD-10-CM

## 2019-06-03 PROCEDURE — 96116 NUBHVL XM PHYS/QHP 1ST HR: CPT | Mod: 59

## 2019-06-03 PROCEDURE — 99205 OFFICE O/P NEW HI 60 MIN: CPT | Mod: 25

## 2019-06-03 NOTE — PROCEDURE
[FreeTextEntry1] : EXTENDED NEUROBEHAVIORAL STATUS TESTING\par \par [This is a separate procedure note for the Neurobehavioral Status Examination that was performed during the encounter]. \par \par The patient was alert, well groomed, NAD. He was fluent without paraphasic errors. No anomia in conversation. Comprehension was intact. Voice was hoarse, harsh, strained. appeared euthymic, reactive. Actively participated in the discussion. Poor insight into his etoh use. can say the number of grandchildren he has and their names, got confused over which of them belonged to whom but able to easily correct on his own. He didn’t have any major slowed information processing\par \par recent memory: can name the president and recent events. president prior to trump he said was "the [place]". with cue of him being  he could choose from list but not say spontaneously. \par didn’t know his medications well. with cues he recalled what he did memorial day weekend. \par \par \par MoCA (original version 7.1) score out of 30: 16\par Visuospatial/Executive \par 	Trails: (-1) he mostly shifted from letter to number but started off wrong with b-c-3-d-4 then error again with 5 before e. speed was not slow.\par 	Cube: (-1) piecemeal, 3d, close but inaccurate\par 	Clock: (-1) numbers shifted over and needed prompting to add the 12 but the hands were placed accurately.\par Naming: intact (self corrected tiger to lion when cued)\par Memory- registration: intact but misheard me so needed extra trials for that\par Attention \par 	Digit span 5F: intact\par 	Digit span 3R: intact\par 	Letter A test: intact\par 	Serial 7 subtraction: intact 4/5\par Language \par 	Phrase repetition: (-1)\par 	F word fluency- # words: 12 and 1 repeat and 2 other instances of saying a repeat but then qustioning if he already had\par Abstraction\par 	Train/bicycle: (-1) wheels\par 	Watch/ruler: measurement\par Delayed recall score out of 5: (-5) 0\par 	Additional words recalled with category cue: 1 and 1 false positive\par 	Additional words recalled with multiple choice cue: 4\par Orientation: (-4) thought nov, 2020, season fall coming into spring, city incorrect.\par \par Additional neurobehavioral status tests:\par Animal naming fluency- # words: 13 and 1 repeat\par luria: could do alone but fist in wrong direction\par Praxis\par       Ideomotor limb\par             Transitive\par 	    Brush teeth: intact b/l\par 	    Comb hair: intact b/l\par             Intransitive\par 	    Wave goodbye: intact b/l\par 	    Motion "come here": intact b/l\par       Meaningless gestures: intact to 1/4 (interlocking circles)\par Right/Left orientation\par 	"Show me your right hand": correct \par 	"Show me your left hand": correct \par 	"With your right hand touch your left ear": correct\par 	"With your right hand, point to my left shoulder": correct\par 	"With your left hand, point to my left ear": correct\par \par INTERPRETATION: \par \par I carefully reviewed the above results of neurobehavioral status testing. The cognitive domains are listed below with my interpretation of whether or not there is an impairment or if performance was within normal limits. \par It should be noted that this testing is distinct from standard neuropsychological testing, which compares the patient's raw scores on different validated batteries of tests to those of their age-matched peers. Therefore subtle deficits may not be apparent on this testing, or instead some incorrect responses may overestimate deficits.\par \par Cognitive domains:\par 	Attention: within normal limits \par 	Working memory: error on serial 7s\par 	Executive function: dec set shifting, exec control of figure copy, abstraction.  luria ok except fist in wrong direction.\par 	Language: phonemic and semantic fluency similar\par 	Memory: in conversation was decent but needed some cues. on testing spontaneous recall was impaired but responded well to cues but with 1 false positive. had repetitions during fluency tasks\par 	Visuospatial function: dec executive control\par 	Praxis: impaired with meaningless gestures\par 	Behavior/Mood: appropriate/euthymic\par 	Other comments: no major psychomotor slowing; disoriented; hearing impaired\par \par Please refer to the assessment section of this encounter that documents how to incorporate the interpretation of these results into the patient's diagnosis and plan of care.\par \par 35 min were taken to administer and interpret this extended neurobehavioral evaluation and prepare the report. \par \par Testing start time: 11:05a\par Testing stop time: 11:25a\par Report time: 15 min\par

## 2019-06-03 NOTE — ASSESSMENT
[FreeTextEntry1] : The patient is an 84 year old right handed man with progressive cognitive decline since approx 2016 with mild impairment in IADLs. He has been drinking in excess since 18yo, approx 2011 switched from vodka to wine but still having 3 drinks per day. On neurobehavioral status testing he had errors with working memory, impaired executive function, memory was impaired but responded to cues but 1 false positive and conversational memory was decent but did better with cues. Apraxia to meaningless gestures. Language with similar phonemic and semantic fluency. 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 Cognitive pattern on testing was less concerning for Alzheimer's disease though we discussed that is also a possibility but i think less likely. We discussed how he wont be easily forming new memories while he is intoxicated and his recall will also be impaired when he is under the influence. Discussed that abstinence from alcohol could actually improve his symptoms but he isnt willing to do that. Suggested he try to decrease intake from 3 to 2 glasses per day, or water down with ice as well so it lasts longer. Counseled regarding how common it is to use etoh to self- treat a mood disorder like anxiety, but then addiction becomes another disease, and that it would be better for him to see psychiatrist and therapist to address this but he didn’t seem willing to do this. \par \par Regarding the microvascular ischemic disease he takes aspirin for his heart disease already, recommended to continue.\par \par Discussed that there are no FDA approved medications in vascular dementia but that the mediactions in AD are off used off label. I don’t recommend aricept because of his heart failure and asthma and copd. Namenda is a possibility and there may be a role in etoh related dementia, however, it can cause confusion and he is already on so many medications that i don’t think there would be any major benefit to outweight potential side effects.  Will check for b1 and b12 deficiencies. Counseled on safety concerns. Counseled on strategies for maintaining cognitive health.

## 2019-06-03 NOTE — PHYSICAL EXAM
[FreeTextEntry1] : General appearance: The patient is awake and alert, in no acute distress.\par Eyes: PERRL, moist conjunctiva, no scleral icterus.\par ENT: Oropharynx clear of any exudate or lesions. Dentition unremarkable. No lesions on lips or gums.\par Neck: supple, trachea midline. \par Pulmonary: Normal respiratory effort, no audible wheezing.\par Cardiac: Regular rate and rhythm. \par Vascular: No peripheral edema.\par Musculoskeletal: Gait and strength as noted in "Neurologic Examination" below. No clubbing or cyanosis. Normal range of motion.\par Skin: Warm and dry. No rashes or lesions. No bruising.\par Neurologic: See separate "Neurologic Examination" below.\par Psychiatric: Mood, affect and orientation as noted below in "Extended Neurobehavioral Examination".\par \par \par \par NEUROLOGIC EXAMINATION\par \par Cranial Nerves: \par visual fields full to confrontation\par pupils equal round and reactive to light\par extraocular motion intact without nystagmus\par facial sensation intact symmetrically\par face appears a little asymmetric, dec mvmt left face with talking and dec left palpebral fissure\par hearing impiared in conversation\par palate raises symmetrically, head turning and shoulder shrug symmetric, tongue midline without deviation with protrusion.\par strained hoarse voice\par Motor: muscle tone maybe mild inc on right with enhancement\par            no pronator drift\par            fine finger movements slower on the left\par            right hand s/p surgery in hand. 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 Romberg's sign was negative\par Coordination: no tremor\par                       intact with finger to nose bilaterally\par Gait: antalgic with back pain, wide based.\par Deep tendon reflexes: 1-2+ at brachioradialis, biceps, triceps, and 0 patellar bilaterally\par Primitive reflexes: No grasp reflex. b/l palmomental reflex. \par Cortical Sensory signs:  No extinction to double simultaneous stimulation.\par \par \par \par Activities of Daily Living (An): independent vs. needs some help vs. unable/needs major assistance.      \par            --responses were the following: except where noted,     indep                                      \par 1. Bathing/showering\par 2. Dressing\par 3. Toileting\par 4. Transferring\par 5. Continence\par 6. Feeding                                                                                                                                                           \par \par Instrumental Activities of Daily Living (Subha-Ignacio): independent vs. needs some help vs. unable/needs major assistance.     \par            --responses were the following: except where noted, indep\par 1. Ability to use telephone\par 2. Shopping\par 3. Food preparation\par 4. Housekeeping- n/a\par 5. Laundry- n/a\par 6. Transportation (public/arranging rides/driving)\par 7. Responsibility for own medications- unable\par 8. Ability to handle finances- some help\par

## 2019-06-03 NOTE — HISTORY OF PRESENT ILLNESS
[FreeTextEntry1] : The patient is a 84 year old right handed man referred by PCP Dr. Roe Samuel\par \par Symptoms since approx 2016. Worse in last 10 mos 2018.\par He would repeat questions, forget recent conversations. Was working a few days per week and doing ok, wife noticed symptoms when stopped working (company was sold). Getting worse. Better with remote memories. Tells the same war stories. Asked same question after 30 min, or misremembers who he had just spoken to on phone. He gets annoyed when she tries to jog his memory and asks a lot of questions. He is able to cue at times. No wfd. Drives without accidents or near accidents. She feels safe when he drives. Got lost in familiar place twice. No issues with technology. His wife has to give him his pills because he has looked at pill bottles of hers and questied if he took it yet, not realizing it wasn’t even his. Made mistake with not paying bill approx , the phone/tv were shut off, so she does it now.\par \par he drinks 3 glasses of wine per day from lunch through dinner. this pattern for years. she tries to do important things with him in the morning when he will be able to help because in the afternoon she knows then he wont be able to. will sit in chair and doze off. he says it helps relax him and doesn’t recognize this as being a problem. hx vodka but gave it up for wine approx 8 yrs. used to have at least 1 vodka per day, but also drank it at work when he did work. started drinking at 20yo. never went through period of time without etoh. hes been told by other doctors to cut back. no hx etoh withdrawal, hallucination, or seizure.\par \par mood: best friend  and 2 brothers - all within 2-3 yrs of each other approx 2016, she thought he was sad then. Stopped playing golf when the friend, his golf partner, . No current anxiety or depression. \par \par sleep: sleeps well. takes naps. no snoring. no gasping. energy is good. \par \par Voice has been hoarse last 3-4 yrs.\par \par Has back issues that cause him pain with walking. He will be seeing ortho soon.\par \par He has an ICD for hx heart block. He has CHF with EF 29% thought to be etoh related. \par Has asthma, on inhalers.\par \par He had an episode of feeling sick in [place] approx , went to the hospital. He noticed he had trouble seeing area on paper properly. Had eye visit after that and told he had a stroke. hct doesn’t show a stroke in an area that would cause a field cut but does show old right cerebellar lacunar infarct for which he is asymptomatic.

## 2019-06-03 NOTE — DATA REVIEWED
[de-identified] : \par hct 3/24/17 (s/p fall on plavix) volume loss, moderate microvascular ischemic disease. chronic right cerebellar lacunar infarect\par i personally reviewed and agree- the volume loss is generalized and significant.

## 2019-06-07 ENCOUNTER — APPOINTMENT (OUTPATIENT)
Dept: PULMONOLOGY | Facility: CLINIC | Age: 84
End: 2019-06-07
Payer: MEDICARE

## 2019-06-07 VITALS
HEART RATE: 61 BPM | TEMPERATURE: 97.4 F | RESPIRATION RATE: 17 BRPM | DIASTOLIC BLOOD PRESSURE: 83 MMHG | OXYGEN SATURATION: 92 % | WEIGHT: 178.38 LBS | BODY MASS INDEX: 23.53 KG/M2 | SYSTOLIC BLOOD PRESSURE: 155 MMHG

## 2019-06-07 PROCEDURE — 99213 OFFICE O/P EST LOW 20 MIN: CPT

## 2019-06-07 NOTE — ASSESSMENT
[FreeTextEntry1] : 85yo male with COPD here for annual visit.  Patient appears to be doing well with current inhalers.  He has remained free of exacerbations over the last year.  Plan to c/w Advair and ProAir.  F/u as needed.

## 2019-06-07 NOTE — HISTORY OF PRESENT ILLNESS
[FreeTextEntry1] : 83yo male with COPD presenting for f/u.  Patient has remained well over the last year without acute exacerbations.  He is using Advair once daily and ProAir PRN.  Denies cough, wheezing, chest tightness and dyspnea.  He is followed by urology for h/o UTI.  Also has cardiac disease, including CHF (on diuretics) and AICD.

## 2019-06-07 NOTE — PHYSICAL EXAM
[Normal Conjunctiva] : the conjunctiva exhibited no abnormalities [Normal Oropharynx] : normal oropharynx [Jugular Venous Distention Increased] : there was no jugular-venous distention [Heart Sounds] : normal S1 and S2 [Murmurs] : no murmurs present [Edema] : no peripheral edema present [Respiration, Rhythm And Depth] : normal respiratory rhythm and effort [Auscultation Breath Sounds / Voice Sounds] : lungs were clear to auscultation bilaterally [Bowel Sounds] : normal bowel sounds [Abdomen Soft] : soft [Abnormal Walk] : normal gait [Nail Clubbing] : no clubbing of the fingernails [Cyanosis, Localized] : no localized cyanosis [Normal Appearance] : normal appearance [General Appearance - In No Acute Distress] : no acute distress [Neck Appearance] : the appearance of the neck was normal [Heart Rate And Rhythm] : heart rate and rhythm were normal [Arterial Pulses Normal] : the arterial pulses were normal [Exaggerated Use Of Accessory Muscles For Inspiration] : no accessory muscle use [] : no rash [No Focal Deficits] : no focal deficits [Affect] : the affect was normal [Mood] : the mood was normal

## 2019-06-20 ENCOUNTER — APPOINTMENT (OUTPATIENT)
Dept: INTERNAL MEDICINE | Facility: CLINIC | Age: 84
End: 2019-06-20
Payer: MEDICARE

## 2019-06-20 VITALS
WEIGHT: 178 LBS | TEMPERATURE: 97.6 F | HEIGHT: 73 IN | BODY MASS INDEX: 23.59 KG/M2 | DIASTOLIC BLOOD PRESSURE: 68 MMHG | HEART RATE: 85 BPM | OXYGEN SATURATION: 95 % | SYSTOLIC BLOOD PRESSURE: 105 MMHG

## 2019-06-20 PROCEDURE — G0439: CPT

## 2019-06-20 PROCEDURE — 99213 OFFICE O/P EST LOW 20 MIN: CPT | Mod: 25

## 2019-06-20 PROCEDURE — G0442 ANNUAL ALCOHOL SCREEN 15 MIN: CPT | Mod: 59

## 2019-06-20 NOTE — REVIEW OF SYSTEMS
[Night Sweats] : no night sweats [Fever] : no fever [Nasal Discharge] : no nasal discharge [Earache] : no earache [Chest Pain] : no chest pain [Orthopena] : no orthopnea [Shortness Of Breath] : no shortness of breath [Wheezing] : no wheezing [Abdominal Pain] : no abdominal pain [Vomiting] : no vomiting [Incontinence] : no incontinence [Frequency] : frequency [Joint Stiffness] : joint stiffness [Headache] : no headache [Memory Loss] : memory loss [Unsteady Walk] : ataxia [Negative] : Integumentary

## 2019-06-20 NOTE — HEALTH RISK ASSESSMENT
[Fair] :  ~his/her~ mood as fair [Yes] : Yes [4 or more  times a week (4 pts)] : 4 or more  times a week (4 points) [3 or 4 (1 pt)] : 3 or 4  (1 point) [Never (0 pts)] : Never (0 points) [Any fall with injury in past year] : Patient reported fall with injury in the past year [Audit-CScore] : 7 [0] : 2) Feeling down, depressed, or hopeless: Not at all (0) [[id number]] : 0 [Change in mental status noted] : Change in mental status noted [Language] : denies difficulty with language [Behavior] : denies difficulty with behavior [Reasoning] : difficulty with reasoning [Handling Complex Tasks] : difficulty handling complex tasks [Learning/Retaining New Information] : difficulty learning/retaining new information [None] : None [Spatial Ability and Orientation] : difficulty with spatial ability and orientation [With Family] : lives with family [Retired] : retired [High School] : high school [] :  [Feels Safe at Home] : Feels safe at home [Independent] : using telephone [Fully functional (bathing, dressing, toileting, transferring, walking, feeding)] : Fully functional (bathing, dressing, toileting, transferring, walking, feeding) [Some assistance needed] : managing finances [Reports changes in hearing] : Reports no changes in hearing [Reports changes in vision] : Reports no changes in vision [Smoke Detector] : smoke detector [Reports changes in dental health] : Reports no changes in dental health [Safety elements used in home] : safety elements used in home [Carbon Monoxide Detector] : carbon monoxide detector [Seat Belt] :  uses seat belt

## 2019-06-20 NOTE — HISTORY OF PRESENT ILLNESS
[FreeTextEntry1] : Annual exam and f/u cognitive and chf [de-identified] : Annual exam\par Had flu and pneumo 23\par aged out of other health maintenance\par \par \par follow up chf, alcohol related\par  cognitive issue\par  see by Neuro Dr Grace  extensive evaluation\par   alcohol related dysfunction\par  breathing ok with recent visit Dr Rosales\par  still 3 glassed of wine daily

## 2019-06-20 NOTE — PLAN
Chief Complaint   Patient presents with    Ankle swelling     R/ankle pain and swelling     I have reviewed the patient's medical history in detail and updated the computerized patient record. Health Maintenance reviewed. 1. Have you been to the ER, urgent care clinic since your last visit? Hospitalized since your last visit?no    2. Have you seen or consulted any other health care providers outside of the [address] [place] [address] Home since your last visit? Include any pap smears or colon screening. No      Encouraged pt to discuss pt's wishes with spouse/partner/family and bring them in the next appt to follow thru with the Advanced Directive    Fall Risk Assessment, last 12 mths 3/13/2019   Able to walk? Yes   Fall in past 12 months? No       3 most recent PHQ Screens 3/13/2019   Little interest or pleasure in doing things Several days   Feeling down, depressed, irritable, or hopeless Several days   Total Score PHQ 2 2       Abuse Screening Questionnaire 3/13/2019   Do you ever feel afraid of your partner? N   Are you in a relationship with someone who physically or mentally threatens you? N   Is it safe for you to go home?  Y       ADL Assessment 3/13/2019   Feeding yourself No Help Needed   Getting from bed to chair No Help Needed   Getting dressed No Help Needed   Bathing or showering No Help Needed   Walk across the room (includes cane/walker) No Help Needed   Using the telphone No Help Needed   Taking your medications No Help Needed   Preparing meals No Help Needed   Managing money (expenses/bills) No Help Needed   Moderately strenuous housework (laundry) No Help Needed   Shopping for personal items (toiletries/medicines) No Help Needed   Shopping for groceries No Help Needed   Driving No Help Needed   Climbing a flight of stairs No Help Needed   Getting to places beyond walking distances No Help Needed [FreeTextEntry1] : Annual exam\par had flu and pneumonia\par \par \par CHF well compensated \par will check level for chf and cognitive issues \par continue meds as is\par  patient unwilling to change his long term alcohol consumption

## 2019-06-20 NOTE — PHYSICAL EXAM
[No Acute Distress] : no acute distress [Well Nourished] : well nourished [Normal Outer Ear/Nose] : the outer ears and nose were normal in appearance [Normal Oropharynx] : the oropharynx was normal [No JVD] : no jugular venous distention [Supple] : supple [No Respiratory Distress] : no respiratory distress  [Clear to Auscultation] : lungs were clear to auscultation bilaterally [Regular Rhythm] : with a regular rhythm [Normal Rate] : normal rate  [Normal Bowel Sounds] : normal bowel sounds [No HSM] : no HSM [No Edema] : there was no peripheral edema [No Joint Swelling] : no joint swelling [No Rash] : no rash [Coordination Grossly Intact] : coordination grossly intact

## 2019-06-20 NOTE — PHYSICAL EXAM
[No Acute Distress] : no acute distress [Well Nourished] : well nourished [Normal Outer Ear/Nose] : the outer ears and nose were normal in appearance [Normal Oropharynx] : the oropharynx was normal [No JVD] : no jugular venous distention [Supple] : supple [No Respiratory Distress] : no respiratory distress  [Clear to Auscultation] : lungs were clear to auscultation bilaterally [Normal Rate] : normal rate  [Regular Rhythm] : with a regular rhythm [Normal Bowel Sounds] : normal bowel sounds [No Edema] : there was no peripheral edema [No HSM] : no HSM [No Joint Swelling] : no joint swelling [No Rash] : no rash [Coordination Grossly Intact] : coordination grossly intact

## 2019-06-20 NOTE — REVIEW OF SYSTEMS
[Night Sweats] : no night sweats [Fever] : no fever [Earache] : no earache [Nasal Discharge] : no nasal discharge [Chest Pain] : no chest pain [Orthopena] : no orthopnea [Shortness Of Breath] : no shortness of breath [Wheezing] : no wheezing [Abdominal Pain] : no abdominal pain [Vomiting] : no vomiting [Incontinence] : no incontinence [Frequency] : frequency [Headache] : no headache [Joint Stiffness] : joint stiffness [Unsteady Walk] : ataxia [Memory Loss] : memory loss [Negative] : Integumentary

## 2019-06-20 NOTE — PLAN
[FreeTextEntry1] : Annual exam\par had flu and pneumonia\par \par \par CHF well compensated \par will check level for chf and cognitive issues \par continue meds as is\par  patient unwilling to change his long term alcohol consumption

## 2019-06-20 NOTE — HISTORY OF PRESENT ILLNESS
[FreeTextEntry1] : Annual exam and f/u cognitive and chf [de-identified] : Annual exam\par Had flu and pneumo 23\par aged out of other health maintenance\par \par \par follow up chf, alcohol related\par  cognitive issue\par  see by Neuro Dr Grace  extensive evaluation\par   alcohol related dysfunction\par  breathing ok with recent visit Dr Rosales\par  still 3 glassed of wine daily

## 2019-06-20 NOTE — HEALTH RISK ASSESSMENT
[Fair] :  ~his/her~ mood as fair [Yes] : Yes [4 or more  times a week (4 pts)] : 4 or more  times a week (4 points) [3 or 4 (1 pt)] : 3 or 4  (1 point) [Never (0 pts)] : Never (0 points) [Any fall with injury in past year] : Patient reported fall with injury in the past year [Audit-CScore] : 7 [0] : 2) Feeling down, depressed, or hopeless: Not at all (0) [[id number]] : 0 [Change in mental status noted] : Change in mental status noted [Behavior] : denies difficulty with behavior [Language] : denies difficulty with language [Handling Complex Tasks] : difficulty handling complex tasks [Reasoning] : difficulty with reasoning [Learning/Retaining New Information] : difficulty learning/retaining new information [None] : None [Spatial Ability and Orientation] : difficulty with spatial ability and orientation [Retired] : retired [With Family] : lives with family [] :  [High School] : high school [Feels Safe at Home] : Feels safe at home [Independent] : using telephone [Fully functional (bathing, dressing, toileting, transferring, walking, feeding)] : Fully functional (bathing, dressing, toileting, transferring, walking, feeding) [Some assistance needed] : managing finances [Reports changes in hearing] : Reports no changes in hearing [Reports changes in vision] : Reports no changes in vision [Reports changes in dental health] : Reports no changes in dental health [Smoke Detector] : smoke detector [Carbon Monoxide Detector] : carbon monoxide detector [Safety elements used in home] : safety elements used in home [Seat Belt] :  uses seat belt

## 2019-06-21 LAB
25(OH)D3 SERPL-MCNC: 44.2 NG/ML
ALBUMIN SERPL ELPH-MCNC: 4 G/DL
ALP BLD-CCNC: 77 U/L
ALT SERPL-CCNC: 20 U/L
ANION GAP SERPL CALC-SCNC: 12 MMOL/L
AST SERPL-CCNC: 26 U/L
BASOPHILS # BLD AUTO: 0.04 K/UL
BASOPHILS NFR BLD AUTO: 0.5 %
BILIRUB SERPL-MCNC: 0.6 MG/DL
BUN SERPL-MCNC: 20 MG/DL
CALCIUM SERPL-MCNC: 9.6 MG/DL
CHLORIDE SERPL-SCNC: 106 MMOL/L
CHOLEST SERPL-MCNC: 174 MG/DL
CHOLEST/HDLC SERPL: 2.5 RATIO
CO2 SERPL-SCNC: 21 MMOL/L
CREAT SERPL-MCNC: 0.76 MG/DL
EOSINOPHIL # BLD AUTO: 0.22 K/UL
EOSINOPHIL NFR BLD AUTO: 2.9 %
ESTIMATED AVERAGE GLUCOSE: 108 MG/DL
FOLATE SERPL-MCNC: >20 NG/ML
GLUCOSE SERPL-MCNC: 105 MG/DL
HBA1C MFR BLD HPLC: 5.4 %
HCT VFR BLD CALC: 41.8 %
HDLC SERPL-MCNC: 69 MG/DL
HGB BLD-MCNC: 13.6 G/DL
IMM GRANULOCYTES NFR BLD AUTO: 0.3 %
LDLC SERPL CALC-MCNC: 87 MG/DL
LYMPHOCYTES # BLD AUTO: 1.6 K/UL
LYMPHOCYTES NFR BLD AUTO: 20.9 %
MAGNESIUM SERPL-MCNC: 1.9 MG/DL
MAN DIFF?: NORMAL
MCHC RBC-ENTMCNC: 31.1 PG
MCHC RBC-ENTMCNC: 32.5 GM/DL
MCV RBC AUTO: 95.4 FL
MONOCYTES # BLD AUTO: 0.58 K/UL
MONOCYTES NFR BLD AUTO: 7.6 %
NEUTROPHILS # BLD AUTO: 5.21 K/UL
NEUTROPHILS NFR BLD AUTO: 67.8 %
NT-PROBNP SERPL-MCNC: 198 PG/ML
PLATELET # BLD AUTO: 243 K/UL
POTASSIUM SERPL-SCNC: 4.4 MMOL/L
PROT SERPL-MCNC: 6.5 G/DL
RBC # BLD: 4.38 M/UL
RBC # FLD: 13.2 %
SODIUM SERPL-SCNC: 139 MMOL/L
T4 FREE SERPL-MCNC: 1.2 NG/DL
TRIGL SERPL-MCNC: 88 MG/DL
TSH SERPL-ACNC: 6.96 UIU/ML
VIT B12 SERPL-MCNC: 631 PG/ML
WBC # FLD AUTO: 7.67 K/UL

## 2019-06-25 LAB — VIT B1 SERPL-MCNC: 179 NMOL/L

## 2019-07-10 ENCOUNTER — APPOINTMENT (OUTPATIENT)
Dept: ELECTROPHYSIOLOGY | Facility: CLINIC | Age: 84
End: 2019-07-10
Payer: MEDICARE

## 2019-07-10 PROCEDURE — 93295 DEV INTERROG REMOTE 1/2/MLT: CPT

## 2019-07-10 PROCEDURE — 93296 REM INTERROG EVL PM/IDS: CPT

## 2019-07-19 ENCOUNTER — APPOINTMENT (OUTPATIENT)
Dept: UROLOGY | Facility: CLINIC | Age: 84
End: 2019-07-19
Payer: MEDICARE

## 2019-07-19 ENCOUNTER — APPOINTMENT (OUTPATIENT)
Dept: UROLOGY | Facility: CLINIC | Age: 84
End: 2019-07-19

## 2019-07-19 DIAGNOSIS — R39.9 UNSPECIFIED SYMPTOMS AND SIGNS INVOLVING THE GENITOURINARY SYSTEM: ICD-10-CM

## 2019-07-19 PROCEDURE — 99213 OFFICE O/P EST LOW 20 MIN: CPT

## 2019-07-19 NOTE — HISTORY OF PRESENT ILLNESS
[FreeTextEntry1] : Patient following up for h/o UTIs. First one resulted in hospitalization at [facility] for urosepsis. He had a Flynn placed but not for obvious retention.He was on Flomax for a bit after passing TOV - he of off it and back to baseline. . At baseline he had intermittently slow stream but no straining, hesitancy or stop/start. Some frequency but no nocturia or incontinence. He had emergency cholecystectomy earlier this year and failed first TOV. He has h/o prostate cancer treated with XRT 2010.  \par \par I put him back on alpha blocker and was voiding well. A follow up  visit urine culture positive for Staph Aureus and treated.Then had another UTI treated in hospital. Voiding well now but has low grade fever and sensation - uncomfortable in urethra. No dysuria. He is on Rapaflo and f/u [facility] ok with no stones or PVR. Cysto oK.  I have noted higher PVRs and given cysto findings suspect some degree of neurogenic bladder. Has been on Hiprex and no UTIs since.\par Here for f/u - no UTI since 10/2017. No symptoms of UTI; dysuria or urgency - cora discomfort urien has been clear.\par Rends to carry a decent residual. \par \par  \par  \par  \par  \par   \par

## 2019-07-26 ENCOUNTER — APPOINTMENT (OUTPATIENT)
Dept: INTERNAL MEDICINE | Facility: CLINIC | Age: 84
End: 2019-07-26
Payer: MEDICARE

## 2019-07-26 VITALS
SYSTOLIC BLOOD PRESSURE: 100 MMHG | HEART RATE: 76 BPM | HEIGHT: 73 IN | BODY MASS INDEX: 24.52 KG/M2 | DIASTOLIC BLOOD PRESSURE: 70 MMHG | WEIGHT: 185 LBS

## 2019-07-26 PROCEDURE — 99214 OFFICE O/P EST MOD 30 MIN: CPT

## 2019-07-26 NOTE — HISTORY OF PRESENT ILLNESS
[FreeTextEntry1] : chf [de-identified] : hx of chf on entresto\par stopped thyroid\par in past tsh 61 and was on 88 mcg\par blood reviewed\par b 12 and b 1\par no sob or cp\par still drinking

## 2019-07-26 NOTE — REVIEW OF SYSTEMS
[Fever] : no fever [Earache] : no earache [Night Sweats] : no night sweats [Shortness Of Breath] : no shortness of breath [Chest Pain] : no chest pain [Nasal Discharge] : no nasal discharge [Abdominal Pain] : no abdominal pain [Wheezing] : no wheezing

## 2019-07-26 NOTE — PLAN
[FreeTextEntry1] : chf compensated\par still etoh\par hypothyroid resume 50\par check blood 2 months

## 2019-07-26 NOTE — PHYSICAL EXAM
[No JVD] : no jugular venous distention [No Acute Distress] : no acute distress [Well Nourished] : well nourished [No Respiratory Distress] : no respiratory distress  [No Lymphadenopathy] : no lymphadenopathy [No Accessory Muscle Use] : no accessory muscle use [Regular Rhythm] : with a regular rhythm [Normal Rate] : normal rate  [No Edema] : there was no peripheral edema [No HSM] : no HSM

## 2019-07-29 LAB — BACTERIA UR CULT: ABNORMAL

## 2019-07-29 NOTE — ED ADULT NURSE NOTE - NS ED NOTE ABUSE SUSPICION NEGLECT YN
Referring Physician: Dr. Oni Aguilar. Kenneth  Reason for Consultation: CHF exacerbation  Chief Complaint: back pain    Subjective:   History of Present Illness:  Blade Gomez is a 80 y.o. patient who presented to the hospital with complaints of a cough and shortness of breath. She was initially having right sided back pain for a few days which radiated around to her right chest. She was having difficulty sleeping secondary to the pain but denies PND and orthopnea. Over a few days, she developed a cough and shortness of breath so she sought medical attention. She continues to smoke but denies weight changes or LE edema. Patient reports compliance to her medications. She said she quickly felt better after a \"breathing treatment. \" She denies fevers or chills. She essentially feels back to baseline today and hopes to go home soon. She was seen by EP as an outpatient and she is agreeable to BiV-ICD placement as an outpatient next month. She was waiting on device placement until family members were off work in case she needed extra help. Past Medical History:   has a past medical history of CHF (congestive heart failure) ([address] COPD (chronic obstructive pulmonary disease) ([facility] [address] and Hypertension. Surgical History:   has a past surgical history that includes Carpal tunnel release; Cholecystectomy; knee surgery; Tonsillectomy; and Tubal ligation. Social History:   reports that she has been smoking cigarettes. She has been smoking about 0.75 packs per day. She has never used smokeless tobacco. She reports that she does not drink alcohol or use drugs. Family History:  family history includes Hypertension in her sister. Home Medications:  Were reviewed and are listed in nursing record and/or below  Prior to Admission medications    Medication Sig Start Date End Date Taking?  Authorizing Provider   oxyCODONE-acetaminophen (PERCOCET) 5-325 MG per tablet Take 1 tablet by mouth every 4 hours as needed for daily.. Historical Provider, MD   albuterol-ipratropium (COMBIVENT RESPIMAT)  MCG/ACT AERS inhaler Inhale 1 puff into the lungs every 6 hours as needed for Wheezing or Shortness of Breath    Historical Provider, MD   hydrocortisone 1 % cream Apply 1 each topically 3 times daily as needed (itching/rash)    Historical Provider, MD        CURRENT Medications:    acetaminophen (TYLENOL) tablet 500 mg Q6H PRN   atorvastatin (LIPITOR) tablet 10 mg Nightly   carvedilol (COREG) tablet 25 mg BID   gabapentin (NEURONTIN) capsule 800 mg TID   famotidine (PEPCID) tablet 20 mg BID   sacubitril-valsartan (ENTRESTO)  MG per tablet 1 tablet BID   sodium chloride flush 0.9 % injection 10 mL 2 times per day   sodium chloride flush 0.9 % injection 10 mL PRN   magnesium hydroxide (MILK OF MAGNESIA) 400 MG/5ML suspension 30 mL Daily PRN   ondansetron (ZOFRAN) injection 4 mg Q6H PRN   enoxaparin (LOVENOX) injection 40 mg Daily   potassium chloride (KLOR-CON M) extended release tablet 40 mEq PRN   Or    potassium bicarb-citric acid (EFFER-K) effervescent tablet 40 mEq PRN   Or    potassium chloride 10 mEq/100 mL IVPB (Peripheral Line) PRN   magnesium sulfate 1 g in dextrose 5% 100 mL IVPB PRN   predniSONE (DELTASONE) tablet 40 mg Daily   furosemide (LASIX) injection 40 mg Daily   magnesium oxide (MAG-OX) tablet 400 mg Daily   albuterol sulfate  (90 Base) MCG/ACT inhaler 2 puff Q4H WA   And    ipratropium (ATROVENT HFA) 17 MCG/ACT inhaler 2 puff Q4H WA   oxyCODONE-acetaminophen (PERCOCET) 5-325 MG per tablet 1 tablet Q4H PRN   cyclobenzaprine (FLEXERIL) tablet 5 mg TID PRN       Allergies:  Asa [aspirin]; Pcn [penicillins]; Shellfish allergy; Sulfa antibiotics; and Tramadol     Review of Systems:   · Constitutional: no unanticipated weight loss. There's been no change in energy level, sleep pattern, or activity level. No fevers, chills. · Eyes: No visual changes or diplopia. No scleral icterus.   · ENT: No Headaches, DLCO    Assessment and Plan   1) COPD exacerbation/Nicotine Addiction. Encouraged smoking cessation. Her acute symptoms seem more consistent with COPD than CHF. Will defer management to primary team.     2) Chronic systolic heart failure. EF 25%. Appears compensated. NYHA II-III. Etiology likely non-ischemic with remote normal cath and EF low at that time. BNP and weight are essentially at baseline. Continue with medical therapy with Entresto  mg bid and Coreg 25 mg bid. Encouraged a low sodium diet. Previously referred to EP for BiV-ICD consideration which will be arranged as an outpatient. No aldactone with increased creatinine from baseline. Discontinue Lasix.    3) PAD. Asymptomatic. Continue with medical management and risk factor modification including. Continue Lipitor. ASA listed in allergies.    4) NSVT. Continue B-blocker.      5) Essential hypertension. Controlled. Goal BP <130/80. Continue medical therapy.    6) Morbid obesity. BMI 44.5. Encouraged weight loss. Thank you for allowing us to participate in the care of Holli HOPE Pepperdebora. Will sign off. Call with questions. Ivan Warner.  Mohini Miller, [address] [place] [address] Road  7/29/2019 10:51 AM No

## 2019-08-01 ENCOUNTER — RX RENEWAL (OUTPATIENT)
Age: 84
End: 2019-08-01

## 2019-08-05 ENCOUNTER — RX RENEWAL (OUTPATIENT)
Age: 84
End: 2019-08-05

## 2019-08-09 ENCOUNTER — APPOINTMENT (OUTPATIENT)
Dept: SPINE | Facility: CLINIC | Age: 84
End: 2019-08-09
Payer: MEDICARE

## 2019-08-09 VITALS
HEIGHT: 73 IN | WEIGHT: 185 LBS | SYSTOLIC BLOOD PRESSURE: 114 MMHG | TEMPERATURE: 98.1 F | RESPIRATION RATE: 16 BRPM | OXYGEN SATURATION: 97 % | DIASTOLIC BLOOD PRESSURE: 65 MMHG | BODY MASS INDEX: 24.52 KG/M2

## 2019-08-09 DIAGNOSIS — M54.5 LOW BACK PAIN: ICD-10-CM

## 2019-08-09 DIAGNOSIS — Z87.891 PERSONAL HISTORY OF NICOTINE DEPENDENCE: ICD-10-CM

## 2019-08-09 PROCEDURE — 99203 OFFICE O/P NEW LOW 30 MIN: CPT

## 2019-08-09 NOTE — HISTORY OF PRESENT ILLNESS
[> 3 months] : more  than 3 months [Back] : back [5] : an average pain level of 5/10 [4] : a minimum pain level of 4/10 [3] : a maximum pain level of 3/10 [Sharp] : sharp [Left] : left [Buttocks] : buttocks [Weakness] : weakness [Thigh] : thigh [Gait Dysfunction] : gait dysfunction [Worsened] : have worsened [PT] : PT [Did the prior interventions help?] : The intervention(s) helped [Chiropractor] : chiropractor [Worsening] : worsening [Pain] : pain [8] : currently ~his/her~ pain is 8 out of 10 [___ yrs] : [unfilled] year(s) ago [Daily] : ~He/She~ states the symptoms seem to be occuring daily [Bending] : worsened by bending [Prolonged Standing] : worsened by prolonged standing [Standing] : worsened by standing [Walking] : worsened by walking [Chiropractic] : relieved by chiropractic manipulation [Physical Therapy] : relieved by physical therapy [Rest] : relieved by rest [FreeTextEntry1] : left sided lumbar pain radiating to left buttocks [Lifting] : not worsened by lifting [Prolonged Sitting] : not worsened by prolonged sitting [Sitting] : not worsened by sitting [Weight Bearing] : not worsened by weight bearing [Ataxia] : no ataxia [Incontinence] : no incontinence [Loss of Dexterity] : good dexterity [Urinary Ret.] : no urinary retention

## 2019-08-09 NOTE — ASSESSMENT
[FreeTextEntry1] : Patient reports many years of persistent left sided lumbar pain that radiates to the left buttock, most recently effecting his gait and resulting in more frequent falls. patient was advised several years ago to follow up with a orthopedist/hip specialist. Patient with + left sided Fabers. Will refer to Ortho-Hip specialist. Patient will also have an xray lumbar flex-ext and scoliosis to evaluate spinal balance. 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 Patient will continue with PT for strengthening and conditioning and balance training. Home safety discussed and need to avoid falls and use cane when ambulating.

## 2019-08-09 NOTE — REASON FOR VISIT
[New Patient Visit] : a new patient visit [Spouse] : spouse [Referred By: _________] : Patient was referred by RAS [FreeTextEntry1] : Patient has previously consulted with Dr. Kelly (2012). Patient reports left sided lumbar pain that radiates to the left buttock. Patient reports the pain is worse in the am. He reports the pain is worsened by standing and walking. He also reports difficulty walking up stairs. He reports a recent fall after getting up from a chair. He reports intermittent weakness to the left leg.  He reports the pain as 8/10 and describes it as a sharp pain.Patient is using a cane sice January 2019. Patient does not take pain medication. Patient is currently doing PT and sees a a chiropractor and reports some relief, which is only temporary.

## 2019-08-09 NOTE — PHYSICAL EXAM
[General Appearance - Alert] : alert [General Appearance - In No Acute Distress] : in no acute distress [General Appearance - Well Nourished] : well nourished [Oriented To Time, Place, And Person] : oriented to person, place, and time [Impaired Insight] : insight and judgment were intact [Affect] : the affect was normal [Person] : oriented to person [Place] : oriented to place [Time] : oriented to time [Cranial Nerves Trigeminal (V)] : facial sensation intact symmetrically [Cranial Nerves Optic (II)] : visual acuity intact bilaterally,  pupils equal round and reactive to light [Cranial Nerves Oculomotor (III)] : extraocular motion intact [Cranial Nerves Facial (VII)] : face symmetrical [Cranial Nerves Glossopharyngeal (IX)] : tongue and palate midline [Cranial Nerves Accessory (XI - Cranial And Spinal)] : head turning and shoulder shrug symmetric [Cranial Nerves Hypoglossal (XII)] : there was no tongue deviation with protrusion [Motor Tone] : muscle tone was normal in all four extremities [Motor Strength] : muscle strength was normal in all four extremities [Motor Handedness Right-Handed] : the patient is right hand dominant [Sensation Tactile Decrease] : light touch was intact [Proprioception] : proprioception was intact [Limited Balance] : the patient's balance was impaired [1+] : Patella left 1+ [2+] : Ankle jerk left 2+ [No Tenderness to Palpation] : no spine tenderness on palpation [Full ROM] : full ROM [Antalgic] : antalgic [Able to toe walk] : the patient was able to toe walk [Full Visual Field] : full visual field [PERRL With Normal Accommodation] : pupils were equal in size, round, reactive to light, with normal accommodation [Neck Cervical Mass (___cm)] : no neck mass was observed [Jugular Venous Distention Increased] : there was no jugular-venous distention [Edema] : there was no peripheral edema [No CVA Tenderness] : no ~M costovertebral angle tenderness [Stooped] : stooped [Shuffling] : shuffling [Antalgic Gait Bilateral] : antalgic bilaterally [Antalgic Gait Left] : antalgic on the left [Normal] : motor strength was normal in all muscle groups [Normal Motor Tone] : the muscle tone was normal [Muscle Atrophy] : muscle atrophy was seen [Skin Color & Pigmentation] : normal skin color and pigmentation [] : no rash [Dysesthesia] : no dysesthesia [Hyperesthesia] : no hyperesthesia [Past-pointing] : there was no past-pointing [Tremor] : no tremor present [Coordination - Dysmetria Impaired Finger-to-Nose Bilateral] : not present [Coordination - Dysmetria Impaired Finger-to-Nose Left Only] : not present on the left side [Coordination - Dysmetria Impaired Finger-to-Nose Right Only] : not present on the ride side [Coordination Dysmetria Impaired Heel-Shin Using Right Heel] : not present on the ride side [Coordination - Dysmetria Impaired Heel-to-Shin Bilateral] : not present [Coordination Dysmetria Impaired Heel-to-Shin Using Left Heel] : not present on the left side [Plantar Reflex Right Only] : normal on the right [Plantar Reflex Left Only] : normal on the left [___] : absent on the right [Morales] : Morales's sign was not demonstrated [___] : absent on the left [Spurling's - Opposite Side] : Negative Spurling's on opposite side [L'Hermitte's] : neck flexion did not produce tingling down the spine/arms [Spurling's Same Side] : Negative Spurling's on same side [Able to heel walk] : the patient was not able to heel walk [Antalgic Gait Right] : not antalgic on the right [FreeTextEntry1] : left lower lumbar tenderness [Paresis Pronator Drift Left-Sided] : no pronator drift on the left [Paresis Pronator Drift Right-Sided] : no pronator drift on the right [Motor Strength Upper Extremities Left] : strength was normal in the left upper extremity [Motor Strength Upper Extremities Right] : strength was normal in the right upper extremity [Motor Strength Lower Extremities Right] : strength was normal in the right lower extremity [Motor Strength Lower Extremities Left] : strength was normal in the left lower extremity [Muscle Hypertrophy] : no muscle hypertrophy was seen

## 2019-08-19 ENCOUNTER — APPOINTMENT (OUTPATIENT)
Dept: RADIOLOGY | Facility: CLINIC | Age: 84
End: 2019-08-19
Payer: MEDICARE

## 2019-08-19 ENCOUNTER — OUTPATIENT (OUTPATIENT)
Dept: OUTPATIENT SERVICES | Facility: HOSPITAL | Age: 84
LOS: 1 days | End: 2019-08-19
Payer: MEDICARE

## 2019-08-19 DIAGNOSIS — Z00.8 ENCOUNTER FOR OTHER GENERAL EXAMINATION: ICD-10-CM

## 2019-08-19 DIAGNOSIS — M54.5 LOW BACK PAIN: ICD-10-CM

## 2019-08-19 PROCEDURE — 72110 X-RAY EXAM L-2 SPINE 4/>VWS: CPT

## 2019-08-19 PROCEDURE — 72084 X-RAY EXAM ENTIRE SPI 6/> VW: CPT

## 2019-08-19 PROCEDURE — 72083 X-RAY EXAM ENTIRE SPI 4/5 VW: CPT | Mod: 26

## 2019-08-19 PROCEDURE — 72082 X-RAY EXAM ENTIRE SPI 2/3 VW: CPT

## 2019-08-27 ENCOUNTER — APPOINTMENT (OUTPATIENT)
Dept: ORTHOPEDIC SURGERY | Facility: CLINIC | Age: 84
End: 2019-08-27
Payer: MEDICARE

## 2019-08-27 DIAGNOSIS — M48.061 SPINAL STENOSIS, LUMBAR REGION WITHOUT NEUROGENIC CLAUDICATION: ICD-10-CM

## 2019-08-27 PROCEDURE — 99203 OFFICE O/P NEW LOW 30 MIN: CPT

## 2019-08-27 NOTE — CONSULT LETTER
[Dear  ___] : Dear  [unfilled], [Consult Letter:] : I had the pleasure of evaluating your patient, [unfilled]. [Please see my note below.] : Please see my note below. [Consult Closing:] : Thank you very much for allowing me to participate in the care of this patient.  If you have any questions, please do not hesitate to contact me. [Sincerely,] : Sincerely, [FreeTextEntry3] : Dr. Keaton Olvera

## 2019-08-27 NOTE — DISCUSSION/SUMMARY
[de-identified] : The underlying pathophysiology was reviewed in great detail with the patient as well as the various treatment options, including ice, analgesics, NSAIDs, Physical therapy, steroid injections. \par \par A prescription for Physical Therapy was provided. \par \par FU PRN.

## 2019-08-27 NOTE — PHYSICAL EXAM
[Normal LLE] : Left Lower Extremity: No scars, rashes, lesions, ulcers, skin intact [Normal Touch] : sensation intact for touch [Normal] : No swelling, no edema, normal pedal pulses and normal temperature [Normal RLE] : Right Lower Extremity: No scars, rashes, lesions, ulcers, skin intact [Poor Appearance] : well-appearing [Acute Distress] : not in acute distress [de-identified] : Lumbosacral Spine:\par Musculoskeletal Examination\par ¦ Inspection : no visible rash, no deformities\par ¦ Palpation : left paraspinal tenderness in the sacral region, no right paraspinal tenderness\par ¦ Stability : no subluxations present\par ¦ Range of Motion : mildly restricted flexion, moderately limited extension\par ¦ Muscle Strength : paraspinal muscle strength and tone within normal limits\par ¦ Muscle Tone : paraspinal muscle strength and tone within normal limits\par ¦ Tests/Signs : Patellar reflex (1+) bilaterally,  Achilles reflex (dull) on right, Achilles reflex (1+) on left.\par \par Right Lower Extremity\par o Hip :\par ¦ Inspection/Palpation : no tenderness, no swelling, no deformity\par ¦ Range of Motion : full and painless in all planes, no crepitus\par ¦ Stability : joint stability intact\par ¦ Strength : hip flexion 5/5\par ¦ Tests and Signs : Straight Leg Raise Test (-) \par ¦ Lower Extremity Muscle Strength : EHL 4/5, tibialis anterior 5/5\par o Muscle Tone : tone normal\par o Muscle Bulk : normal muscle bulk present\par o Skin : no erythema, no ecchymosis\par o Sensation : sensation to light touch intact\par o Vascular Exam : no edema, no cyanosis, dorsalis pedis artery pulse 2+, posterior tibial artery pulse 2+ \par \par Left Lower Extremity\par o Hip :\par ¦ Inspection/Palpation : no tenderness, no swelling, no deformity\par ¦ Range of Motion : full in all planes with mild pain, no crepitus\par ¦ Stability : joint stability intact\par ¦ Strength : hip flexion 5/5\par ¦ Tests and Signs : Straight Leg Raise Test (+)\par ¦ Lower Extremity Muscle Strength : EHL 4/5, tibialis anterior 5/5\par o Muscle Tone : tone normal\par o Muscle Bulk : normal muscle bulk present\par o Skin : no erythema, no ecchymosis\par o Sensation : sensation to light touch intact\par o Vascular Exam : no edema, no cyanosis, dorsalis pedis artery pulse 2+, posterior tibial artery pulse 2+ [de-identified] : o XRays of the lumbar spine obtained at [facility] on 8/19/2019, revealed:\par 1. Re-demonstrated multilevel degenerative disease manifest by varying degrees of disc space narrowing with disc margin osteophyte formation and posterior facet arthrosis. Broad slight dextrocurvature. \par 2. Grade 1 anterolisthesis of L3 on L4 however without associated spondylolysis defects or evidence for dynamic instability. Remaining vertebral body alignment maintained. \par 3. Straightened lumbar lordosis could be related to muscle spasm and/or the underlying degenerative change. Exaggerated thoracic kyphosis. Positive coronal and sagittal balances. Slight left elevated pelvic tilt. \par 4. Unremarkable SI joints and partially visualized hips. \par 5. 3 small metallic prostatic fiducial markers project in infrapubic region. \par 6. Generalized osteopenia otherwise no discrete lytic or blastic lesions.

## 2019-08-27 NOTE — END OF VISIT
[FreeTextEntry3] : All medical record entries made by the Hakeemibryann were at my, Dr. Keaton Olvera, direction and personally dictated by me on 08/27/2019. I have reviewed the chart and agree that the record accurately reflects my personal performance of the history, physical exam, assessment and plan. I have also personally directed, reviewed, and agreed with the chart.

## 2019-08-27 NOTE — ADDENDUM
[FreeTextEntry1] : I, Funmi Bazan, acted solely as a scribe for Dr. Keaton Olvera on this date 08/27/2019.

## 2019-08-27 NOTE — HISTORY OF PRESENT ILLNESS
[de-identified] : 84 year old male presents for an evaluation of lower back pain and he cannot attribute his pain to any specific injury or event. On 8/19/2019, XRays of the lumbar spine were obtained at [facility] that revealed multilevel degenerative disk disease, as well as straightened lumbar lordosis. Today the patient describes an aching pain located about his lower back that radiates down to his left hip. His symptoms are exacerbated with extended periods of walking, and he notes that his pain is alleviated with walking in with a stooped posture. The patient has no other complaints at this time.

## 2019-09-27 ENCOUNTER — APPOINTMENT (OUTPATIENT)
Dept: ELECTROPHYSIOLOGY | Facility: CLINIC | Age: 84
End: 2019-09-27
Payer: MEDICARE

## 2019-09-27 PROCEDURE — 93283 PRGRMG EVAL IMPLANTABLE DFB: CPT

## 2019-10-15 ENCOUNTER — APPOINTMENT (OUTPATIENT)
Dept: UROLOGY | Facility: CLINIC | Age: 84
End: 2019-10-15
Payer: MEDICARE

## 2019-10-15 ENCOUNTER — OUTPATIENT (OUTPATIENT)
Dept: OUTPATIENT SERVICES | Facility: HOSPITAL | Age: 84
LOS: 1 days | End: 2019-10-15
Payer: MEDICARE

## 2019-10-15 LAB
BILIRUB UR QL STRIP: NORMAL
GLUCOSE UR-MCNC: NORMAL
HCG UR QL: 0.2 EU/DL
HGB UR QL STRIP.AUTO: NORMAL
KETONES UR-MCNC: NORMAL
LEUKOCYTE ESTERASE UR QL STRIP: NORMAL
NITRITE UR QL STRIP: NORMAL
PH UR STRIP: 6.5
PROT UR STRIP-MCNC: NORMAL
SP GR UR STRIP: 1.01

## 2019-10-15 PROCEDURE — 52000 CYSTOURETHROSCOPY: CPT

## 2019-10-16 NOTE — HISTORY OF PRESENT ILLNESS
[FreeTextEntry1] : Patient following up for h/o UTIs. First one resulted in hospitalization at [facility] for urosepsis. He had a Flynn placed but not for obvious retention.He was on Flomax for a bit after passing TOV - he of off it and back to baseline. . At baseline he had intermittently slow stream but no straining, hesitancy or stop/start. Some frequency but no nocturia or incontinence. He had emergency cholecystectomy earlier this year and failed first TOV. He has h/o prostate cancer treated with XRT 2010.  \par \par I put him back on alpha blocker and was voiding well. A follow up  visit urine culture positive for Staph Aureus and treated.Then had another UTI treated in hospital. Voiding well now but has low grade fever and sensation - uncomfortable in urethra. No dysuria. He is on Rapaflo and f/u [facility] ok with no stones or PVR. Cysto oK.  I have noted higher PVRs and given cysto findings suspect some degree of neurogenic bladder. Has been on Hiprex and no UTIs since (2017).\par \par here today with discomfort in the urethra - not when voiding. No discharge or hematuria. sits to void else nothing new. Here for f/u - no UTI since 10/2017. No symptoms of UTI; dysuria or urgency - no discomfort urine has been clear.\par PVR 510cc - much higher than in past \par \par  \par  \par  \par  \par   \par

## 2019-10-16 NOTE — PHYSICAL EXAM
[General Appearance - Well Developed] : well developed [General Appearance - Well Nourished] : well nourished [Abdomen Soft] : soft [Abdomen Tenderness] : non-tender [Abdomen Mass (___ Cm)] : no abdominal mass palpated [Abdomen Hernia] : no hernia was discovered [Penis Abnormality] : normal circumcised penis [Urinary Bladder Findings] : the bladder was normal on palpation [Scrotum] : the scrotum was normal [Edema] : no peripheral edema [] : no respiratory distress [Exaggerated Use Of Accessory Muscles For Inspiration] : no accessory muscle use [Oriented To Time, Place, And Person] : oriented to person, place, and time [Normal Station and Gait] : the gait and station were normal for the patient's age [No Focal Deficits] : no focal deficits

## 2019-10-17 ENCOUNTER — CLINICAL ADVICE (OUTPATIENT)
Age: 84
End: 2019-10-17

## 2019-11-05 DIAGNOSIS — N39.0 URINARY TRACT INFECTION, SITE NOT SPECIFIED: ICD-10-CM

## 2019-11-05 DIAGNOSIS — R33.9 RETENTION OF URINE, UNSPECIFIED: ICD-10-CM

## 2019-11-07 ENCOUNTER — LABORATORY RESULT (OUTPATIENT)
Age: 84
End: 2019-11-07

## 2019-11-07 ENCOUNTER — APPOINTMENT (OUTPATIENT)
Dept: INTERNAL MEDICINE | Facility: CLINIC | Age: 84
End: 2019-11-07
Payer: MEDICARE

## 2019-11-07 VITALS
WEIGHT: 167 LBS | BODY MASS INDEX: 22.13 KG/M2 | HEIGHT: 73 IN | DIASTOLIC BLOOD PRESSURE: 78 MMHG | SYSTOLIC BLOOD PRESSURE: 110 MMHG | HEART RATE: 72 BPM

## 2019-11-07 DIAGNOSIS — N64.9 DISORDER OF BREAST, UNSPECIFIED: ICD-10-CM

## 2019-11-07 PROCEDURE — 90653 IIV ADJUVANT VACCINE IM: CPT

## 2019-11-07 PROCEDURE — 36415 COLL VENOUS BLD VENIPUNCTURE: CPT

## 2019-11-07 PROCEDURE — G0008: CPT

## 2019-11-07 PROCEDURE — 99214 OFFICE O/P EST MOD 30 MIN: CPT | Mod: 25

## 2019-11-07 NOTE — REVIEW OF SYSTEMS
[Chest Pain] : no chest pain [Orthopena] : no orthopnea [Shortness Of Breath] : shortness of breath [Cough] : no cough [Abdominal Pain] : no abdominal pain [Negative] : ENT [FreeTextEntry8] : self cath and rapapflo

## 2019-11-07 NOTE — PHYSICAL EXAM
[No Acute Distress] : no acute distress [Well Nourished] : well nourished [No JVD] : no jugular venous distention [No Lymphadenopathy] : no lymphadenopathy [No Respiratory Distress] : no respiratory distress  [No Accessory Muscle Use] : no accessory muscle use [Normal Rate] : normal rate  [de-identified] : left breast 1.5 cm prominence under left nipple [de-identified] : 1 plus edema [Regular Rhythm] : with a regular rhythm

## 2019-11-07 NOTE — HISTORY OF PRESENT ILLNESS
[FreeTextEntry1] : lump left breast and need [de-identified] : need flu shot\par lump left breast\par on spironolactone\par history of chf on entresto\par no recent bloods\par See cardiologist Dr Varela\par

## 2019-11-07 NOTE — PLAN
[FreeTextEntry1] : flu shot given\par check all lab and potassium\par stop spirolactone(gybecomastia)\par breast mammo and us\par

## 2019-11-08 ENCOUNTER — APPOINTMENT (OUTPATIENT)
Dept: NEUROLOGY | Facility: CLINIC | Age: 84
End: 2019-11-08

## 2019-11-10 LAB
ALBUMIN SERPL ELPH-MCNC: 3.8 G/DL
ALP BLD-CCNC: 80 U/L
ALT SERPL-CCNC: 18 U/L
ANION GAP SERPL CALC-SCNC: 12 MMOL/L
AST SERPL-CCNC: 20 U/L
BASOPHILS # BLD AUTO: 0.03 K/UL
BASOPHILS NFR BLD AUTO: 0.5 %
BILIRUB SERPL-MCNC: 0.4 MG/DL
BUN SERPL-MCNC: 26 MG/DL
CALCIUM SERPL-MCNC: 9.3 MG/DL
CHLORIDE SERPL-SCNC: 103 MMOL/L
CO2 SERPL-SCNC: 20 MMOL/L
CREAT SERPL-MCNC: 0.78 MG/DL
EOSINOPHIL # BLD AUTO: 0.19 K/UL
EOSINOPHIL NFR BLD AUTO: 3.2 %
GLUCOSE SERPL-MCNC: 98 MG/DL
HCT VFR BLD CALC: 41.4 %
HGB BLD-MCNC: 13.4 G/DL
IMM GRANULOCYTES NFR BLD AUTO: 0.3 %
LYMPHOCYTES # BLD AUTO: 1.32 K/UL
LYMPHOCYTES NFR BLD AUTO: 21.9 %
MAN DIFF?: NORMAL
MCHC RBC-ENTMCNC: 30.7 PG
MCHC RBC-ENTMCNC: 32.4 GM/DL
MCV RBC AUTO: 94.7 FL
MONOCYTES # BLD AUTO: 0.52 K/UL
MONOCYTES NFR BLD AUTO: 8.6 %
NEUTROPHILS # BLD AUTO: 3.95 K/UL
NEUTROPHILS NFR BLD AUTO: 65.5 %
NT-PROBNP SERPL-MCNC: 173 PG/ML
PLATELET # BLD AUTO: 249 K/UL
POTASSIUM SERPL-SCNC: 4.7 MMOL/L
PROT SERPL-MCNC: 6.4 G/DL
RBC # BLD: 4.37 M/UL
RBC # FLD: 12.5 %
SODIUM SERPL-SCNC: 135 MMOL/L
TSH SERPL-ACNC: 5.12 UIU/ML
WBC # FLD AUTO: 6.03 K/UL

## 2019-11-12 ENCOUNTER — APPOINTMENT (OUTPATIENT)
Dept: UROLOGY | Facility: CLINIC | Age: 84
End: 2019-11-12
Payer: MEDICARE

## 2019-11-12 PROCEDURE — 99212 OFFICE O/P EST SF 10 MIN: CPT

## 2019-11-12 NOTE — HISTORY OF PRESENT ILLNESS
[FreeTextEntry1] : Patient following up for h/o UTIs. First one resulted in hospitalization at [facility] for urosepsis. He had a Flynn placed but not for obvious retention.He was on Flomax for a bit after passing TOV - he of off it and back to baseline. . At baseline he had intermittently slow stream but no straining, hesitancy or stop/start. Some frequency but no nocturia or incontinence. He had emergency cholecystectomy earlier this year and failed first TOV. He has h/o prostate cancer treated with XRT 2010.  \par \par I put him back on alpha blocker and was voiding well. A follow up  visit urine culture positive for Staph Aureus and treated.Then had another UTI treated in hospital. Voiding well now but has low grade fever and sensation - uncomfortable in urethra. No dysuria. He is on Rapaflo and f/u USG ok with no stones or PVR. Cysto oK.  I have noted higher PVRs and given cysto findings suspect some degree of neurogenic bladder. Has been on Hiprex and no UTIs since (2017).\par \par here last visit with discomfort in the urethra - not when voiding. No discharge or hematuria. sits to void else nothing new. Here for f/u - no UTI since 10/2017. No symptoms of UTI; dysuria or urgency - no discomfort urine has been clear. 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 No dysuria or hematuria. Wakes up at night but no to void. \par \par  \par  \par  \par  \par   \par

## 2019-11-26 ENCOUNTER — APPOINTMENT (OUTPATIENT)
Dept: UROLOGY | Facility: CLINIC | Age: 84
End: 2019-11-26

## 2019-12-10 ENCOUNTER — RX RENEWAL (OUTPATIENT)
Age: 84
End: 2019-12-10

## 2019-12-27 ENCOUNTER — APPOINTMENT (OUTPATIENT)
Dept: ELECTROPHYSIOLOGY | Facility: CLINIC | Age: 84
End: 2019-12-27
Payer: MEDICARE

## 2019-12-27 PROCEDURE — 93296 REM INTERROG EVL PM/IDS: CPT

## 2019-12-27 PROCEDURE — 93295 DEV INTERROG REMOTE 1/2/MLT: CPT

## 2020-01-16 ENCOUNTER — MOBILE ON CALL (OUTPATIENT)
Age: 85
End: 2020-01-16

## 2020-01-24 ENCOUNTER — APPOINTMENT (OUTPATIENT)
Dept: UROLOGY | Facility: CLINIC | Age: 85
End: 2020-01-24
Payer: MEDICARE

## 2020-01-24 VITALS
RESPIRATION RATE: 16 BRPM | HEIGHT: 73 IN | SYSTOLIC BLOOD PRESSURE: 115 MMHG | BODY MASS INDEX: 22.13 KG/M2 | OXYGEN SATURATION: 98 % | DIASTOLIC BLOOD PRESSURE: 67 MMHG | TEMPERATURE: 98.2 F | HEART RATE: 82 BPM | WEIGHT: 167 LBS

## 2020-01-24 PROCEDURE — 99213 OFFICE O/P EST LOW 20 MIN: CPT

## 2020-01-24 NOTE — HISTORY OF PRESENT ILLNESS
[FreeTextEntry1] : Patient following up for h/o UTIs. First one resulted in hospitalization at [facility] for urosepsis. He had a Flynn placed but not for obvious retention.He was on Flomax for a bit after passing TOV - he of off it and back to baseline. . At baseline he had intermittently slow stream but no straining, hesitancy or stop/start. Some frequency but no nocturia or incontinence. He had emergency cholecystectomy earlier this year and failed first TOV. He has h/o prostate cancer treated with XRT 2010.  \par \par I put him back on alpha blocker and was voiding well. A follow up  visit urine culture positive for Staph Aureus and treated.Then had another UTI treated in hospital. Voiding well now but has low grade fever and sensation - uncomfortable in urethra. No dysuria. He is on Rapaflo and f/u USG ok with no stones or PVR. Cysto oK.  I have noted higher PVRs and given cysto findings suspect some degree of neurogenic bladder. Has been on Hiprex and no UTIs since (2017).\par \par here last visit with discomfort in the urethra - not when voiding. No discharge or hematuria. sits to void else nothing new. Here for f/u - no UTI since 10/2017. No symptoms of UTI; dysuria or urgency - no discomfort urine has been clear. 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 No dysuria or hematuria. Wakes up at night but no to void. using the lubricated catheters - much more comfortable\par  No symptoms of UTI\par \par  \par  \par  \par  \par   \par

## 2020-01-24 NOTE — PHYSICAL EXAM
[General Appearance - Well Developed] : well developed [General Appearance - Well Nourished] : well nourished [Abdomen Soft] : soft [Abdomen Tenderness] : non-tender [Abdomen Mass (___ Cm)] : no abdominal mass palpated [Abdomen Hernia] : no hernia was discovered [Urinary Bladder Findings] : the bladder was normal on palpation [Edema] : no peripheral edema [] : no respiratory distress [Oriented To Time, Place, And Person] : oriented to person, place, and time [Exaggerated Use Of Accessory Muscles For Inspiration] : no accessory muscle use [No Focal Deficits] : no focal deficits

## 2020-01-27 LAB — BACTERIA UR CULT: NORMAL

## 2020-03-04 NOTE — PHYSICAL THERAPY INITIAL EVALUATION ADULT - FUNCTIONAL LIMITATIONS, PT EVAL
[Vulvar Atrophy] : vulvar atrophy [Normal] : vagina [Cystocele] : a cystocele [Atrophy] : atrophy [Rectocele] : a rectocele [No Bleeding] : there was no active vaginal bleeding community/leisure/home management/self-care

## 2020-03-27 ENCOUNTER — APPOINTMENT (OUTPATIENT)
Dept: ELECTROPHYSIOLOGY | Facility: CLINIC | Age: 85
End: 2020-03-27

## 2020-04-02 ENCOUNTER — APPOINTMENT (OUTPATIENT)
Dept: ELECTROPHYSIOLOGY | Facility: CLINIC | Age: 85
End: 2020-04-02
Payer: MEDICARE

## 2020-04-02 PROCEDURE — 93295 DEV INTERROG REMOTE 1/2/MLT: CPT

## 2020-04-02 PROCEDURE — 93296 REM INTERROG EVL PM/IDS: CPT

## 2020-04-06 ENCOUNTER — APPOINTMENT (OUTPATIENT)
Dept: INTERNAL MEDICINE | Facility: CLINIC | Age: 85
End: 2020-04-06
Payer: MEDICARE

## 2020-04-06 DIAGNOSIS — R68.89 OTHER GENERAL SYMPTOMS AND SIGNS: ICD-10-CM

## 2020-04-06 DIAGNOSIS — Z71.89 OTHER SPECIFIED COUNSELING: ICD-10-CM

## 2020-04-06 PROCEDURE — 99441: CPT

## 2020-04-07 ENCOUNTER — APPOINTMENT (OUTPATIENT)
Dept: INTERNAL MEDICINE | Facility: CLINIC | Age: 85
End: 2020-04-07

## 2020-04-07 PROBLEM — R68.89 SUSPECTED 2019-NCOV INFECTION: Status: ACTIVE | Noted: 2020-04-01

## 2020-04-07 PROBLEM — Z71.89 ADVICE GIVEN ABOUT 2019-NCOV BY TELEPHONE: Status: ACTIVE | Noted: 2020-04-01

## 2020-04-08 NOTE — HISTORY OF PRESENT ILLNESS
[Verbal consent obtained from patient] : the patient, [unfilled] [FreeTextEntry1] : day 15\par no fever\par very weak\par no cough\par no bm but net eating much [No] : no difficulty breathing [Stable] : stable [Phone/Virtual visit schedule to occur within 24 hours] : Phone/virtual visit schedule to occur within 24 hours [TextBox_8] : 15 [TextBox_44] : Weakness and poor appetite biggest problem\par needs help with ADL\par unable to transfer or use torileting\par await vns [Time Spent: ___ minutes] : I have spent [unfilled] minutes with the patient on the telephone

## 2020-04-09 RX ORDER — METOPROLOL SUCCINATE 25 MG/1
25 TABLET, EXTENDED RELEASE ORAL DAILY
Qty: 90 | Refills: 0 | Status: ACTIVE | COMMUNITY

## 2020-04-09 RX ORDER — FLUTICASONE PROPIONATE 50 UG/1
50 SPRAY, METERED NASAL TWICE DAILY
Refills: 0 | Status: DISCONTINUED | COMMUNITY
Start: 2018-08-08 | End: 2020-04-09

## 2020-04-09 RX ORDER — SPIRONOLACTONE 25 MG/1
25 TABLET ORAL DAILY
Refills: 0 | Status: DISCONTINUED | COMMUNITY
End: 2020-04-09

## 2020-04-09 RX ORDER — FLUTICASONE PROPIONATE 50 UG/1
50 SPRAY, METERED NASAL DAILY
Qty: 1 | Refills: 3 | Status: ACTIVE | COMMUNITY
Start: 2020-04-09

## 2020-04-09 RX ORDER — ALBUTEROL SULFATE 90 UG/1
108 (90 BASE) INHALANT RESPIRATORY (INHALATION)
Qty: 42.5 | Refills: 0 | Status: DISCONTINUED | COMMUNITY
Start: 2019-04-11 | End: 2020-04-09

## 2020-04-09 RX ORDER — FUROSEMIDE 20 MG/1
20 TABLET ORAL
Refills: 0 | Status: DISCONTINUED | COMMUNITY
Start: 2018-08-08 | End: 2020-04-09

## 2020-05-18 PROCEDURE — 88112 CYTOPATH CELL ENHANCE TECH: CPT | Mod: 26

## 2020-05-21 LAB
APPEARANCE: ABNORMAL
BACTERIA: NEGATIVE
BILIRUBIN URINE: NEGATIVE
BLOOD URINE: NORMAL
COLOR: NORMAL
GLUCOSE QUALITATIVE U: NEGATIVE
HYALINE CASTS: 2 /LPF
KETONES URINE: NEGATIVE
LEUKOCYTE ESTERASE URINE: ABNORMAL
MICROSCOPIC-UA: NORMAL
NITRITE URINE: NEGATIVE
PH URINE: 6.5
PROTEIN URINE: NEGATIVE
RED BLOOD CELLS URINE: 7 /HPF
SPECIFIC GRAVITY URINE: 1.01
SQUAMOUS EPITHELIAL CELLS: 0 /HPF
UROBILINOGEN URINE: NORMAL
WHITE BLOOD CELLS URINE: 409 /HPF

## 2020-05-22 LAB
BACTERIA UR CULT: ABNORMAL
URINE CYTOLOGY: NORMAL

## 2020-06-12 ENCOUNTER — OUTPATIENT (OUTPATIENT)
Dept: OUTPATIENT SERVICES | Facility: HOSPITAL | Age: 85
LOS: 1 days | End: 2020-06-12
Payer: MEDICARE

## 2020-06-12 ENCOUNTER — APPOINTMENT (OUTPATIENT)
Dept: UROLOGY | Facility: CLINIC | Age: 85
End: 2020-06-12
Payer: MEDICARE

## 2020-06-12 VITALS
WEIGHT: 167 LBS | HEIGHT: 73 IN | DIASTOLIC BLOOD PRESSURE: 63 MMHG | SYSTOLIC BLOOD PRESSURE: 104 MMHG | HEART RATE: 41 BPM | BODY MASS INDEX: 22.13 KG/M2 | RESPIRATION RATE: 15 BRPM

## 2020-06-12 VITALS — TEMPERATURE: 97.6 F

## 2020-06-12 PROCEDURE — 51702 INSERT TEMP BLADDER CATH: CPT

## 2020-06-12 NOTE — REVIEW OF SYSTEMS
[Arthralgias] : arthralgias [see HPI] : see HPI [Negative] : Gastrointestinal [Joint Pain] : joint pain

## 2020-06-15 ENCOUNTER — APPOINTMENT (OUTPATIENT)
Dept: INTERNAL MEDICINE | Facility: CLINIC | Age: 85
End: 2020-06-15
Payer: MEDICARE

## 2020-06-15 VITALS
OXYGEN SATURATION: 97 % | BODY MASS INDEX: 22.13 KG/M2 | SYSTOLIC BLOOD PRESSURE: 94 MMHG | HEART RATE: 82 BPM | DIASTOLIC BLOOD PRESSURE: 62 MMHG | HEIGHT: 73 IN | WEIGHT: 167 LBS

## 2020-06-15 DIAGNOSIS — Z11.59 ENCOUNTER FOR SCREENING FOR OTHER VIRAL DISEASES: ICD-10-CM

## 2020-06-15 DIAGNOSIS — Z46.6 ENCOUNTER FOR FITTING AND ADJUSTMENT OF URINARY DEVICE: ICD-10-CM

## 2020-06-15 DIAGNOSIS — Z74.09 OTHER REDUCED MOBILITY: ICD-10-CM

## 2020-06-15 PROCEDURE — G0439: CPT

## 2020-06-15 PROCEDURE — 99214 OFFICE O/P EST MOD 30 MIN: CPT | Mod: 25

## 2020-06-15 PROCEDURE — 36415 COLL VENOUS BLD VENIPUNCTURE: CPT

## 2020-06-15 RX ORDER — AZELASTINE HYDROCHLORIDE 137 UG/1
0.1 SPRAY, METERED NASAL
Qty: 30 | Refills: 0 | Status: COMPLETED | COMMUNITY
Start: 2020-01-03

## 2020-06-15 RX ORDER — MONTELUKAST 10 MG/1
10 TABLET, FILM COATED ORAL
Qty: 30 | Refills: 0 | Status: DISCONTINUED | COMMUNITY
Start: 2020-05-27 | End: 2020-06-15

## 2020-06-15 RX ORDER — PREDNISONE 20 MG/1
20 TABLET ORAL DAILY
Qty: 7 | Refills: 0 | Status: DISCONTINUED | COMMUNITY
Start: 2020-04-09 | End: 2020-06-15

## 2020-06-15 NOTE — HISTORY OF PRESENT ILLNESS
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

## 2020-06-15 NOTE — PHYSICAL EXAM
[Well Nourished] : well nourished [No JVD] : no jugular venous distention [No Acute Distress] : no acute distress [No Lymphadenopathy] : no lymphadenopathy [No Accessory Muscle Use] : no accessory muscle use [No Respiratory Distress] : no respiratory distress  [Normal] : soft, non-tender, non-distended, no masses palpated, no HSM and normal bowel sounds [de-identified] : paced [Normal Rate] : normal rate  [de-identified] : ankle edema  without pulses?

## 2020-06-15 NOTE — HEALTH RISK ASSESSMENT
[] : No [Poor] : ~his/her~ mood as  poor [Yes] : Yes [3 or 4 (1 pt)] : 3 or 4  (1 point) [4 or more  times a week (4 pts)] : 4 or more  times a week (4 points) [Weekly (3 pts)] : Weekly (3 points) [Two or more falls in past year] : Patient reported two or more falls in the past year [Change in mental status noted] : No change in mental status noted [Language] : denies difficulty with language [Behavior] : difficulty with behavior [Handling Complex Tasks] : difficulty handling complex tasks [Learning/Retaining New Information] : difficulty learning/retaining new information [Spatial Ability and Orientation] : difficulty with spatial ability and orientation [Reasoning] : difficulty with reasoning [None] : None [Retired] : retired [College] : College [With Family] : lives with family [Feels Safe at Home] : Feels safe at home [] :  [Some assistance needed] : walking [Independent] : using telephone [Full assistance needed] : using transportation [Reports changes in vision] : Reports no changes in vision [Reports changes in dental health] : Reports no changes in dental health [Smoke Detector] : smoke detector [Carbon Monoxide Detector] : carbon monoxide detector [Guns at Home] : no guns at home [Seat Belt] :  uses seat belt [Safety elements used in home] : safety elements used in home

## 2020-06-15 NOTE — REVIEW OF SYSTEMS
[Fever] : no fever [Night Sweats] : no night sweats [Earache] : no earache [Chest Pain] : no chest pain [Nasal Discharge] : no nasal discharge [Abdominal Pain] : no abdominal pain [Shortness Of Breath] : no shortness of breath [Orthopena] : no orthopnea [Vomiting] : no vomiting [FreeTextEntry8] : indwelling lyon [FreeTextEntry9] : ankles swollen, cool , non tender

## 2020-06-15 NOTE — ASSESSMENT
[FreeTextEntry1] : Annual exam\par had all vaccine\par \par \par cardiomyopathy from alchohol continues to drink and refuses to stop\par Seem chf compensated \par to check potassium and other\par continue care cardiology and urology\par using walker

## 2020-06-16 LAB
25(OH)D3 SERPL-MCNC: 42.7 NG/ML
ALBUMIN SERPL ELPH-MCNC: 4 G/DL
ALP BLD-CCNC: 111 U/L
ALT SERPL-CCNC: 18 U/L
ANION GAP SERPL CALC-SCNC: 20 MMOL/L
AST SERPL-CCNC: 28 U/L
BASOPHILS # BLD AUTO: 0.04 K/UL
BASOPHILS NFR BLD AUTO: 0.5 %
BILIRUB SERPL-MCNC: 0.4 MG/DL
BUN SERPL-MCNC: 14 MG/DL
CALCIUM SERPL-MCNC: 9.7 MG/DL
CHLORIDE SERPL-SCNC: 100 MMOL/L
CHOLEST SERPL-MCNC: 178 MG/DL
CHOLEST/HDLC SERPL: 2.7 RATIO
CO2 SERPL-SCNC: 21 MMOL/L
CREAT SERPL-MCNC: 0.83 MG/DL
EOSINOPHIL # BLD AUTO: 0.2 K/UL
EOSINOPHIL NFR BLD AUTO: 2.6 %
ESTIMATED AVERAGE GLUCOSE: 100 MG/DL
GLUCOSE SERPL-MCNC: 90 MG/DL
HBA1C MFR BLD HPLC: 5.1 %
HCT VFR BLD CALC: 43.6 %
HDLC SERPL-MCNC: 66 MG/DL
HGB BLD-MCNC: 13.5 G/DL
IMM GRANULOCYTES NFR BLD AUTO: 0.3 %
LDLC SERPL CALC-MCNC: 88 MG/DL
LYMPHOCYTES # BLD AUTO: 1.99 K/UL
LYMPHOCYTES NFR BLD AUTO: 25.6 %
MAN DIFF?: NORMAL
MCHC RBC-ENTMCNC: 31 GM/DL
MCHC RBC-ENTMCNC: 31 PG
MCV RBC AUTO: 100 FL
MONOCYTES # BLD AUTO: 0.49 K/UL
MONOCYTES NFR BLD AUTO: 6.3 %
NEUTROPHILS # BLD AUTO: 5.04 K/UL
NEUTROPHILS NFR BLD AUTO: 64.7 %
NT-PROBNP SERPL-MCNC: 307 PG/ML
PLATELET # BLD AUTO: 300 K/UL
POTASSIUM SERPL-SCNC: 4.7 MMOL/L
PROT SERPL-MCNC: 7 G/DL
RBC # BLD: 4.36 M/UL
RBC # FLD: 16.1 %
SARS-COV-2 IGG SERPL IA-ACNC: 6.57 INDEX
SARS-COV-2 IGG SERPL QL IA: POSITIVE
SODIUM SERPL-SCNC: 140 MMOL/L
T4 FREE SERPL-MCNC: 1.1 NG/DL
TRIGL SERPL-MCNC: 120 MG/DL
TSH SERPL-ACNC: 8.01 UIU/ML
URATE SERPL-MCNC: 7.7 MG/DL
WBC # FLD AUTO: 7.78 K/UL

## 2020-06-17 NOTE — PHYSICAL EXAM
[General Appearance - Well Nourished] : well nourished [Abdomen Soft] : soft [General Appearance - Well Developed] : well developed [Urethral Meatus] : meatus normal [Penis Abnormality] : normal circumcised penis [Urinary Bladder Findings] : the bladder was normal on palpation [] : no rash [Edema] : no peripheral edema [Exaggerated Use Of Accessory Muscles For Inspiration] : no accessory muscle use [Oriented To Time, Place, And Person] : oriented to person, place, and time [FreeTextEntry1] : uses walker

## 2020-06-17 NOTE — HISTORY OF PRESENT ILLNESS
[FreeTextEntry1] : Patient following up for h/o UTIs. First one resulted in hospitalization at [facility] for urosepsis. He had a Flynn placed but not for obvious retention.He was on Flomax for a bit after passing TOV - he of off it and back to baseline. . At baseline he had intermittently slow stream but no straining, hesitancy or stop/start. Some frequency but no nocturia or incontinence. He had emergency cholecystectomy earlier this year and failed first TOV. He has h/o prostate cancer treated with XRT 2010.  \par \par I put him back on alpha blocker and was voiding well. A follow up  visit urine culture positive for Staph Aureus and treated.Then had another UTI treated in hospital. Voiding well now but has low grade fever and sensation - uncomfortable in urethra. No dysuria. He is on Rapaflo and f/u USG ok with no stones or PVR. Cysto oK.  I have noted higher PVRs and given cysto findings suspect some degree of neurogenic bladder. Has been on Hiprex and no UTIs since (2017).\par \par here last visit with discomfort in the urethra - not when voiding. No discharge or hematuria. sits to void else nothing new. Here for f/u - no UTI since 10/2017. No symptoms of UTI; dysuria or urgency - no discomfort urine has been clear. 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 No dysuria or hematuria. Wakes up at night but no to void. using the lubricated catheters - much more comfortable\par  No symptoms of UTI\par \par  6/12/20: pt reports about 4 days ago he wasn't able to perform CIC as it would get "stuck" and since then he hasn't tried CIC. He now urinates frequency. Denies urgency, dysuria or hematuria. \par PVR >500cc\par  \par  \par  \par   \par

## 2020-06-17 NOTE — ASSESSMENT
[FreeTextEntry1] : Urinary retention\par - 14Fr Coude placed \par - f/u in 1 week. Remove catheter and teach CIC again

## 2020-06-19 ENCOUNTER — OUTPATIENT (OUTPATIENT)
Dept: OUTPATIENT SERVICES | Facility: HOSPITAL | Age: 85
LOS: 1 days | End: 2020-06-19
Payer: MEDICARE

## 2020-06-19 ENCOUNTER — APPOINTMENT (OUTPATIENT)
Dept: UROLOGY | Facility: CLINIC | Age: 85
End: 2020-06-19
Payer: MEDICARE

## 2020-06-19 VITALS — TEMPERATURE: 97.4 F

## 2020-06-19 DIAGNOSIS — R35.0 FREQUENCY OF MICTURITION: ICD-10-CM

## 2020-06-19 PROCEDURE — 51702 INSERT TEMP BLADDER CATH: CPT

## 2020-06-22 DIAGNOSIS — Z46.6 ENCOUNTER FOR FITTING AND ADJUSTMENT OF URINARY DEVICE: ICD-10-CM

## 2020-06-22 DIAGNOSIS — R33.9 RETENTION OF URINE, UNSPECIFIED: ICD-10-CM

## 2020-06-24 DIAGNOSIS — R33.9 RETENTION OF URINE, UNSPECIFIED: ICD-10-CM

## 2020-06-30 ENCOUNTER — APPOINTMENT (OUTPATIENT)
Dept: UROLOGY | Facility: CLINIC | Age: 85
End: 2020-06-30
Payer: MEDICARE

## 2020-06-30 ENCOUNTER — APPOINTMENT (OUTPATIENT)
Dept: UROLOGY | Facility: CLINIC | Age: 85
End: 2020-06-30

## 2020-06-30 VITALS — RESPIRATION RATE: 17 BRPM | HEART RATE: 79 BPM | DIASTOLIC BLOOD PRESSURE: 68 MMHG | SYSTOLIC BLOOD PRESSURE: 107 MMHG

## 2020-06-30 VITALS — TEMPERATURE: 97.3 F

## 2020-06-30 PROCEDURE — 99212 OFFICE O/P EST SF 10 MIN: CPT

## 2020-07-02 ENCOUNTER — APPOINTMENT (OUTPATIENT)
Dept: ELECTROPHYSIOLOGY | Facility: CLINIC | Age: 85
End: 2020-07-02
Payer: MEDICARE

## 2020-07-02 ENCOUNTER — APPOINTMENT (OUTPATIENT)
Dept: ELECTROPHYSIOLOGY | Facility: CLINIC | Age: 85
End: 2020-07-02

## 2020-07-02 PROCEDURE — 93296 REM INTERROG EVL PM/IDS: CPT

## 2020-07-02 PROCEDURE — 93295 DEV INTERROG REMOTE 1/2/MLT: CPT

## 2020-07-06 ENCOUNTER — APPOINTMENT (OUTPATIENT)
Dept: ELECTROPHYSIOLOGY | Facility: CLINIC | Age: 85
End: 2020-07-06
Payer: MEDICARE

## 2020-07-06 VITALS — SYSTOLIC BLOOD PRESSURE: 96 MMHG | DIASTOLIC BLOOD PRESSURE: 40 MMHG

## 2020-07-06 DIAGNOSIS — U07.1 COVID-19: ICD-10-CM

## 2020-07-06 PROCEDURE — 93283 PRGRMG EVAL IMPLANTABLE DFB: CPT

## 2020-07-06 PROCEDURE — 93290 INTERROG DEV EVAL ICPMS IP: CPT

## 2020-07-28 ENCOUNTER — RX RENEWAL (OUTPATIENT)
Age: 85
End: 2020-07-28

## 2020-08-26 ENCOUNTER — APPOINTMENT (OUTPATIENT)
Dept: OPHTHALMOLOGY | Facility: CLINIC | Age: 85
End: 2020-08-26

## 2020-09-06 ENCOUNTER — RX RENEWAL (OUTPATIENT)
Age: 85
End: 2020-09-06

## 2020-09-08 ENCOUNTER — RX RENEWAL (OUTPATIENT)
Age: 85
End: 2020-09-08

## 2020-09-16 ENCOUNTER — APPOINTMENT (OUTPATIENT)
Dept: INTERNAL MEDICINE | Facility: CLINIC | Age: 85
End: 2020-09-16
Payer: MEDICARE

## 2020-09-16 VITALS
SYSTOLIC BLOOD PRESSURE: 127 MMHG | DIASTOLIC BLOOD PRESSURE: 76 MMHG | BODY MASS INDEX: 23.59 KG/M2 | OXYGEN SATURATION: 94 % | HEART RATE: 66 BPM | WEIGHT: 178 LBS | HEIGHT: 73 IN

## 2020-09-16 PROCEDURE — 90662 IIV NO PRSV INCREASED AG IM: CPT

## 2020-09-16 PROCEDURE — 36415 COLL VENOUS BLD VENIPUNCTURE: CPT

## 2020-09-16 PROCEDURE — 99214 OFFICE O/P EST MOD 30 MIN: CPT | Mod: 25

## 2020-09-16 PROCEDURE — G0008: CPT

## 2020-09-16 NOTE — REVIEW OF SYSTEMS
[Vomiting] : no vomiting [Abdominal Pain] : no abdominal pain [Negative] : Cardiovascular [FreeTextEntry8] : self catheter [FreeTextEntry5] : paced

## 2020-09-16 NOTE — HISTORY OF PRESENT ILLNESS
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

## 2020-09-16 NOTE — PHYSICAL EXAM
[Normal] : no respiratory distress, lungs were clear to auscultation bilaterally and no accessory muscle use [No Palpable Aorta] : no palpable aorta [No Edema] : there was no peripheral edema [Soft] : abdomen soft [No HSM] : no HSM

## 2020-09-17 LAB
25(OH)D3 SERPL-MCNC: 44.1 NG/ML
ALBUMIN SERPL ELPH-MCNC: 3.9 G/DL
ALP BLD-CCNC: 66 U/L
ALT SERPL-CCNC: 19 U/L
ANION GAP SERPL CALC-SCNC: 11 MMOL/L
AST SERPL-CCNC: 24 U/L
BASOPHILS # BLD AUTO: 0.04 K/UL
BASOPHILS NFR BLD AUTO: 0.7 %
BILIRUB SERPL-MCNC: 0.4 MG/DL
BUN SERPL-MCNC: 19 MG/DL
CALCIUM SERPL-MCNC: 9.3 MG/DL
CHLORIDE SERPL-SCNC: 105 MMOL/L
CHOLEST SERPL-MCNC: 163 MG/DL
CHOLEST/HDLC SERPL: 2.6 RATIO
CO2 SERPL-SCNC: 22 MMOL/L
CREAT SERPL-MCNC: 0.88 MG/DL
DIGOXIN SERPL-MCNC: <0.3 NG/ML
EOSINOPHIL # BLD AUTO: 0.23 K/UL
EOSINOPHIL NFR BLD AUTO: 3.8 %
ESTIMATED AVERAGE GLUCOSE: 120 MG/DL
FOLATE SERPL-MCNC: >20 NG/ML
GLUCOSE SERPL-MCNC: 101 MG/DL
HBA1C MFR BLD HPLC: 5.8 %
HCT VFR BLD CALC: 40.6 %
HDLC SERPL-MCNC: 63 MG/DL
HGB BLD-MCNC: 13.2 G/DL
IMM GRANULOCYTES NFR BLD AUTO: 0.3 %
LDLC SERPL CALC-MCNC: 80 MG/DL
LYMPHOCYTES # BLD AUTO: 1.58 K/UL
LYMPHOCYTES NFR BLD AUTO: 26 %
MAGNESIUM SERPL-MCNC: 1.9 MG/DL
MAN DIFF?: NORMAL
MCHC RBC-ENTMCNC: 30.7 PG
MCHC RBC-ENTMCNC: 32.5 GM/DL
MCV RBC AUTO: 94.4 FL
MONOCYTES # BLD AUTO: 0.46 K/UL
MONOCYTES NFR BLD AUTO: 7.6 %
NEUTROPHILS # BLD AUTO: 3.74 K/UL
NEUTROPHILS NFR BLD AUTO: 61.6 %
NT-PROBNP SERPL-MCNC: 228 PG/ML
PLATELET # BLD AUTO: 231 K/UL
POTASSIUM SERPL-SCNC: 4.6 MMOL/L
PROT SERPL-MCNC: 6.2 G/DL
RBC # BLD: 4.3 M/UL
RBC # FLD: 12.6 %
SODIUM SERPL-SCNC: 138 MMOL/L
T4 FREE SERPL-MCNC: 1.4 NG/DL
TRIGL SERPL-MCNC: 100 MG/DL
TSH SERPL-ACNC: 3.39 UIU/ML
VIT B12 SERPL-MCNC: 425 PG/ML
WBC # FLD AUTO: 6.07 K/UL

## 2020-10-07 ENCOUNTER — APPOINTMENT (OUTPATIENT)
Dept: ELECTROPHYSIOLOGY | Facility: CLINIC | Age: 85
End: 2020-10-07
Payer: MEDICARE

## 2020-10-07 PROCEDURE — 93296 REM INTERROG EVL PM/IDS: CPT

## 2020-10-07 PROCEDURE — 93295 DEV INTERROG REMOTE 1/2/MLT: CPT

## 2020-10-22 ENCOUNTER — RX RENEWAL (OUTPATIENT)
Age: 85
End: 2020-10-22

## 2020-11-02 RX ORDER — LEVOTHYROXINE SODIUM 0.05 MG/1
50 TABLET ORAL
Qty: 90 | Refills: 2 | Status: DISCONTINUED | COMMUNITY
Start: 2019-07-26 | End: 2020-11-02

## 2020-12-21 PROBLEM — N39.0 ACUTE UTI: Status: RESOLVED | Noted: 2019-05-02 | Resolved: 2020-12-21

## 2021-01-11 ENCOUNTER — APPOINTMENT (OUTPATIENT)
Dept: ELECTROPHYSIOLOGY | Facility: CLINIC | Age: 86
End: 2021-01-11
Payer: MEDICARE

## 2021-01-11 PROCEDURE — 93295 DEV INTERROG REMOTE 1/2/MLT: CPT

## 2021-01-11 PROCEDURE — 93296 REM INTERROG EVL PM/IDS: CPT

## 2021-01-22 ENCOUNTER — NON-APPOINTMENT (OUTPATIENT)
Age: 86
End: 2021-01-22

## 2021-01-22 ENCOUNTER — APPOINTMENT (OUTPATIENT)
Dept: OPHTHALMOLOGY | Facility: CLINIC | Age: 86
End: 2021-01-22
Payer: MEDICARE

## 2021-01-22 PROCEDURE — 92134 CPTRZ OPH DX IMG PST SGM RTA: CPT

## 2021-01-22 PROCEDURE — 99214 OFFICE O/P EST MOD 30 MIN: CPT

## 2021-02-17 ENCOUNTER — APPOINTMENT (OUTPATIENT)
Dept: INTERNAL MEDICINE | Facility: CLINIC | Age: 86
End: 2021-02-17

## 2021-03-19 ENCOUNTER — RX RENEWAL (OUTPATIENT)
Age: 86
End: 2021-03-19

## 2021-04-16 ENCOUNTER — RX RENEWAL (OUTPATIENT)
Age: 86
End: 2021-04-16

## 2021-04-19 NOTE — ED ADULT NURSE NOTE - AS O2 DELIVERY
supplemental O2 Rotation Flap Text: The defect edges were debeveled with a #15 scalpel blade.  Given the location of the defect, shape of the defect and the proximity to free margins a rotation flap was deemed most appropriate.  Using a sterile surgical marker, an appropriate rotation flap was drawn incorporating the defect and placing the expected incisions within the relaxed skin tension lines where possible.    The area thus outlined was incised deep to adipose tissue with a #15 scalpel blade.  The skin margins were undermined to an appropriate distance in all directions utilizing iris scissors.

## 2021-06-14 ENCOUNTER — RX RENEWAL (OUTPATIENT)
Age: 86
End: 2021-06-14

## 2021-06-30 NOTE — DISCHARGE NOTE ADULT - MEDICATION SUMMARY - MEDICATIONS TO TAKE
I will START or STAY ON the medications listed below when I get home from the hospital:    Aspirin Enteric Coated 81 mg oral delayed release tablet  -- 1 tab(s) by mouth once a day  -- Indication: For CAD    tamsulosin 0.4 mg oral capsule  -- 1 cap(s) by mouth once a day  -- Indication: For BPH (benign prostatic hypertrophy) with urinary retention    citalopram 10 mg oral tablet  -- 1 tab(s) by mouth once a day  -- Indication: For Depression    Plavix 75 mg oral tablet  -- 1 tab(s) by mouth once a day  -- Indication: For CAD    Advair Diskus 100 mcg-50 mcg inhalation powder  -- 1 puff(s) inhaled 2 times a day  -- Indication: For COPD    ProAir HFA 90 mcg/inh inhalation aerosol  -- 2 puff(s) inhaled 4 times a day, As Needed  -- Indication: For COPD    triamcinolone 0.1% topical cream  -- Apply on skin to affected area 2 times a day   -- Indication: For Rash    ciprofloxacin 500 mg oral tablet  -- 1 tab(s) by mouth 2 times a day  -- Avoid prolonged or excessive exposure to direct and/or artificial sunlight while taking this medication.  Check with your doctor before becoming pregnant.  Do not take dairy products, antacids, or iron preparations within one hour of this medication.  Finish all this medication unless otherwise directed by prescriber.  Medication should be taken with plenty of water.    -- Indication: For UTI (urinary tract infection)    Synthroid 88 mcg (0.088 mg) oral tablet  -- 1 tab(s) by mouth once a day  -- Indication: For Hypothyroid
nail pain

## 2021-07-07 ENCOUNTER — APPOINTMENT (OUTPATIENT)
Dept: ELECTROPHYSIOLOGY | Facility: CLINIC | Age: 86
End: 2021-07-07
Payer: MEDICARE

## 2021-07-07 ENCOUNTER — APPOINTMENT (OUTPATIENT)
Dept: ELECTROPHYSIOLOGY | Facility: CLINIC | Age: 86
End: 2021-07-07

## 2021-07-07 VITALS — SYSTOLIC BLOOD PRESSURE: 93 MMHG | HEART RATE: 72 BPM | DIASTOLIC BLOOD PRESSURE: 66 MMHG

## 2021-07-07 PROCEDURE — 93283 PRGRMG EVAL IMPLANTABLE DFB: CPT

## 2021-07-07 RX ORDER — DIGOXIN 125 UG/1
125 TABLET ORAL
Qty: 90 | Refills: 2 | Status: DISCONTINUED | COMMUNITY
Start: 2020-06-15 | End: 2021-07-07

## 2021-07-07 RX ORDER — MULTIVITAMIN
TABLET ORAL
Refills: 0 | Status: DISCONTINUED | COMMUNITY
End: 2021-07-07

## 2021-07-07 RX ORDER — FUROSEMIDE 20 MG/1
20 TABLET ORAL
Qty: 30 | Refills: 0 | Status: DISCONTINUED | COMMUNITY
Start: 2020-06-15 | End: 2021-07-07

## 2021-07-07 RX ORDER — MULTIVIT-MIN/FA/LYCOPEN/LUTEIN .4-300-25
TABLET ORAL DAILY
Refills: 0 | Status: ACTIVE | COMMUNITY

## 2021-09-21 ENCOUNTER — APPOINTMENT (OUTPATIENT)
Dept: UROLOGY | Facility: CLINIC | Age: 86
End: 2021-09-21
Payer: MEDICARE

## 2021-09-21 PROCEDURE — 99213 OFFICE O/P EST LOW 20 MIN: CPT

## 2021-09-21 NOTE — HISTORY OF PRESENT ILLNESS
[FreeTextEntry1] : Patient following up for h/o UTIs. First one resulted in hospitalization at [facility] for urosepsis. He had a Flynn placed but not for obvious retention.He was on Flomax for a bit after passing TOV - he of off it and back to baseline. . At baseline he had intermittently slow stream but no straining, hesitancy or stop/start. Some frequency but no nocturia or incontinence. He had emergency cholecystectomy earlier this year and failed first TOV. He has h/o prostate cancer treated with XRT 2010.  \par \par I put him back on alpha blocker and was voiding well. A follow up  visit urine culture positive for Staph Aureus and treated.Then had another UTI treated in hospital. Voiding well now but has low grade fever and sensation - uncomfortable in urethra. No dysuria. He is on Rapaflo and f/u USG ok with no stones or PVR. Cysto oK.  I have noted higher PVRs and given cysto findings suspect some degree of neurogenic bladder. Has been on Hiprex and no UTIs since (2017).\par \par here last visit with discomfort in the urethra - not when voiding. No discharge or hematuria. sits to void else nothing new. Here for f/u - no UTI since 10/2017. No symptoms of UTI; dysuria or urgency - no discomfort urine has been clear. 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 No dysuria or hematuria. Wakes up at night but no to void. using the lubricated catheters - much more comfortable\par \par 9/21 - here with urethral discomfort - has been cathing more frequently as didn't feel voiding enough. As such having more urethral discomfort with no bleeding or discharge. urine seems clear.\par Still on Methenamine and Silodosin. \par \par  \par  \par  \par  \par   \par

## 2021-09-23 LAB — BACTERIA UR CULT: NORMAL

## 2021-10-01 ENCOUNTER — MED ADMIN CHARGE (OUTPATIENT)
Age: 86
End: 2021-10-01

## 2021-10-01 ENCOUNTER — APPOINTMENT (OUTPATIENT)
Dept: INTERNAL MEDICINE | Facility: CLINIC | Age: 86
End: 2021-10-01
Payer: MEDICARE

## 2021-10-01 PROCEDURE — G0008: CPT

## 2021-10-01 PROCEDURE — 90662 IIV NO PRSV INCREASED AG IM: CPT

## 2021-10-08 ENCOUNTER — RX RENEWAL (OUTPATIENT)
Age: 86
End: 2021-10-08

## 2021-10-18 ENCOUNTER — APPOINTMENT (OUTPATIENT)
Dept: ELECTROPHYSIOLOGY | Facility: CLINIC | Age: 86
End: 2021-10-18
Payer: MEDICARE

## 2021-10-18 ENCOUNTER — NON-APPOINTMENT (OUTPATIENT)
Age: 86
End: 2021-10-18

## 2021-10-18 PROCEDURE — 93296 REM INTERROG EVL PM/IDS: CPT

## 2021-10-18 PROCEDURE — 93295 DEV INTERROG REMOTE 1/2/MLT: CPT

## 2021-10-29 ENCOUNTER — APPOINTMENT (OUTPATIENT)
Dept: UROLOGY | Facility: CLINIC | Age: 86
End: 2021-10-29
Payer: MEDICARE

## 2021-10-29 PROCEDURE — 99213 OFFICE O/P EST LOW 20 MIN: CPT

## 2021-10-29 NOTE — HISTORY OF PRESENT ILLNESS
[FreeTextEntry1] : Patient following up for h/o UTIs. First one resulted in hospitalization at [facility] for urosepsis. He had a Flynn placed but not for obvious retention.He was on Flomax for a bit after passing TOV - he of off it and back to baseline. . At baseline he had intermittently slow stream but no straining, hesitancy or stop/start. Some frequency but no nocturia or incontinence. He had emergency cholecystectomy earlier this year and failed first TOV. He has h/o prostate cancer treated with XRT 2010.  \par \par I put him back on alpha blocker and was voiding well. A follow up  visit urine culture positive for Staph Aureus and treated.Then had another UTI treated in hospital. Voiding well now but has low grade fever and sensation - uncomfortable in urethra. No dysuria. He is on Rapaflo and f/u USG ok with no stones or PVR. Cysto oK.  I have noted higher PVRs and given cysto findings suspect some degree of neurogenic bladder. Has been on Hiprex and no UTIs since (2017).\par \par here last visit with discomfort in the urethra - not when voiding. No discharge or hematuria. sits to void else nothing new. Here for f/u - no UTI since 10/2017. No symptoms of UTI; dysuria or urgency - no discomfort urine has been clear. 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 No dysuria or hematuria. Wakes up at night but no to void. using the lubricated catheters - much more comfortable\par \par 9/21 - here with urethral discomfort - has been cathing more frequently as didn't feel voiding enough. As such having more urethral discomfort with no bleeding or discharge. urine seems clear.\par Still on Methenamine and Silodosin. \par \par 10/21 - not sure why here today; not urinating on own a all anymore.\par caths am and PM only - occasionally midday if has discomfort. No leaking between or at night\par no dysuria or hematureia.\par \par \par  \par  \par  \par  \par   \par

## 2021-11-05 ENCOUNTER — RX RENEWAL (OUTPATIENT)
Age: 86
End: 2021-11-05

## 2021-11-08 NOTE — ED ADULT NURSE NOTE - NS ED NURSE REPORT GIVEN TO FT
Rehabilitation services/Teaching and training
PATRICK León on [facility]

## 2021-11-09 ENCOUNTER — APPOINTMENT (OUTPATIENT)
Dept: PULMONOLOGY | Facility: CLINIC | Age: 86
End: 2021-11-09
Payer: MEDICARE

## 2021-11-09 VITALS
TEMPERATURE: 97.2 F | SYSTOLIC BLOOD PRESSURE: 128 MMHG | HEART RATE: 83 BPM | RESPIRATION RATE: 16 BRPM | HEIGHT: 73 IN | BODY MASS INDEX: 24.92 KG/M2 | WEIGHT: 188 LBS | DIASTOLIC BLOOD PRESSURE: 77 MMHG

## 2021-11-09 DIAGNOSIS — R09.82 POSTNASAL DRIP: ICD-10-CM

## 2021-11-09 PROCEDURE — 99213 OFFICE O/P EST LOW 20 MIN: CPT

## 2021-11-09 NOTE — ASSESSMENT
[FreeTextEntry1] : 87yo male with COPD, HTN, Hypothyroidism, BPH, Prostate CA s/p RT presenting for f/u.  Refills for Advair and albuterol given today.  Pt is doing well on current inhalers.  He has been COVID vaccinated and is going for his booster next week. He may follow up as needed.  He will continue to use Astelin for the upper airway cough.

## 2021-11-09 NOTE — END OF VISIT
[FreeTextEntry3] : I obtained the history and examined the patient and developed a plan of care  Nigel Rosales MD\par

## 2021-11-09 NOTE — HISTORY OF PRESENT ILLNESS
[FreeTextEntry1] : 87yo male with COPD, HTN, Hypothyroidism, BPH, Prostate CA s/p RT presenting for f/u.  Pt had COVID back in March 2020 for which he was hospitalized due to pneumonia and pneumothorax.  He is using Advair once daily and ProAir PRN.  He does cough but has been using Astelin for post nasal drip, which he states is helpful. He needs refills on his respiratory inhalers.  \par He is followed by urology for h/o UTI he self catheterizes twice a day.  Also has cardiac disease, including CHF (on diuretics) and AICD.

## 2021-11-09 NOTE — REVIEW OF SYSTEMS
[Cough] : cough [Negative] : Cardiovascular [Chest Tightness] : no chest tightness [Dyspnea] : no dyspnea [Wheezing] : no wheezing [SOB on Exertion] : no sob on exertion

## 2021-12-07 ENCOUNTER — APPOINTMENT (OUTPATIENT)
Dept: OPHTHALMOLOGY | Facility: CLINIC | Age: 86
End: 2021-12-07
Payer: MEDICARE

## 2021-12-07 ENCOUNTER — NON-APPOINTMENT (OUTPATIENT)
Age: 86
End: 2021-12-07

## 2021-12-07 PROCEDURE — 92014 COMPRE OPH EXAM EST PT 1/>: CPT

## 2021-12-07 PROCEDURE — 92134 CPTRZ OPH DX IMG PST SGM RTA: CPT

## 2021-12-23 DIAGNOSIS — R19.7 DIARRHEA, UNSPECIFIED: ICD-10-CM

## 2022-01-27 ENCOUNTER — NON-APPOINTMENT (OUTPATIENT)
Age: 87
End: 2022-01-27

## 2022-01-27 ENCOUNTER — APPOINTMENT (OUTPATIENT)
Dept: ELECTROPHYSIOLOGY | Facility: CLINIC | Age: 87
End: 2022-01-27
Payer: MEDICARE

## 2022-01-27 PROCEDURE — 93295 DEV INTERROG REMOTE 1/2/MLT: CPT

## 2022-01-27 PROCEDURE — 93296 REM INTERROG EVL PM/IDS: CPT

## 2022-02-28 NOTE — H&P ADULT. - BACK
PT received on the vent  With settings  AC/VC/  /  PEEP  +5/  RR  12/ FIO2  40%. ETT size  7.5 @  22 at  The lips . Sx  Done  And continue  To monitor the  PT
RESPIRATORY THERAPY PATIENT TRANSPORT NOTE    Transported from: er  Transported to:CCU/227      Patient is intubated?:yes  Transport ventilator used? :yes  Resuscitation bag used during transport?:no   ETT placement and ventilator function were verified post-transport.     RCP comments:
detailed exam

## 2022-03-06 ENCOUNTER — NON-APPOINTMENT (OUTPATIENT)
Age: 87
End: 2022-03-06

## 2022-03-31 ENCOUNTER — APPOINTMENT (OUTPATIENT)
Dept: INTERNAL MEDICINE | Facility: CLINIC | Age: 87
End: 2022-03-31
Payer: MEDICARE

## 2022-03-31 VITALS
DIASTOLIC BLOOD PRESSURE: 65 MMHG | SYSTOLIC BLOOD PRESSURE: 118 MMHG | WEIGHT: 188 LBS | OXYGEN SATURATION: 95 % | RESPIRATION RATE: 15 BRPM | TEMPERATURE: 97.9 F | BODY MASS INDEX: 24.92 KG/M2 | HEIGHT: 73 IN | HEART RATE: 79 BPM

## 2022-03-31 PROCEDURE — 36415 COLL VENOUS BLD VENIPUNCTURE: CPT

## 2022-03-31 PROCEDURE — G0439: CPT

## 2022-03-31 PROCEDURE — 99214 OFFICE O/P EST MOD 30 MIN: CPT | Mod: 25

## 2022-03-31 RX ORDER — PREDNISONE 20 MG/1
20 TABLET ORAL
Qty: 12 | Refills: 0 | Status: DISCONTINUED | COMMUNITY
Start: 2021-08-17 | End: 2022-03-31

## 2022-03-31 RX ORDER — VANCOMYCIN HYDROCHLORIDE 125 MG/1
125 CAPSULE ORAL 4 TIMES DAILY
Qty: 40 | Refills: 4 | Status: DISCONTINUED | COMMUNITY
Start: 2021-12-23 | End: 2022-03-31

## 2022-03-31 NOTE — HISTORY OF PRESENT ILLNESS
[de-identified] : Annual exam\par had flu/pneumonia and 3 covid shot\par had covid prior\par \par alcoholic cardiomyopathy with aicd\par still drink 2-3 mug of wine daily???? refuses to stop\par on entresto\par no leg swelling or sob still on advair and uses ventolin prn\par urinary issues\par self cath twice daily on hipprux and rapaflo

## 2022-03-31 NOTE — HEALTH RISK ASSESSMENT
[Poor] : ~his/her~ mood as  poor [Yes] : Yes [4 or more  times a week (4 pts)] : 4 or more  times a week (4 points) [3 or 4 (1 pt)] : 3 or 4  (1 point) [Never (0 pts)] : Never (0 points) [Any fall with injury in past year] : Patient reported fall with injury in the past year [0] : 2) Feeling down, depressed, or hopeless: Not at all (0) [PHQ-2 Negative - No further assessment needed] : PHQ-2 Negative - No further assessment needed [[id number]] : 0 [Change in mental status noted] : No change in mental status noted [Language] : denies difficulty with language [Behavior] : denies difficulty with behavior [Learning/Retaining New Information] : difficulty learning/retaining new information [Handling Complex Tasks] : difficulty handling complex tasks [Reasoning] : difficulty with reasoning [Spatial Ability and Orientation] : difficulty with spatial ability and orientation [With Family] : lives with family [Retired] : retired [College] : College [] :  [Feels Safe at Home] : Feels safe at home [Some assistance needed] : walking [Independent] : using telephone [Full assistance needed] : managing finances [Reports changes in hearing] : Reports no changes in hearing [Reports changes in vision] : Reports no changes in vision [Reports changes in dental health] : Reports no changes in dental health [Smoke Detector] : smoke detector [Carbon Monoxide Detector] : carbon monoxide detector [Guns at Home] : no guns at home [Safety elements used in home] : safety elements used in home [Seat Belt] :  uses seat belt

## 2022-03-31 NOTE — REVIEW OF SYSTEMS
[Lower Ext Edema] : no lower extremity edema [Negative] : Respiratory [FreeTextEntry8] : self cath bid

## 2022-03-31 NOTE — PLAN
[FreeTextEntry1] : Annua exam\par had all vaccines\par \par \par \par refuses to give up alcohol\par otherwise clinically stable?\par routine blood\par pro bnp\par check uric acid and a1c(Eats a lot of chocolate\par under care of urologist

## 2022-04-01 LAB
25(OH)D3 SERPL-MCNC: 46.1 NG/ML
ALBUMIN SERPL ELPH-MCNC: 3.9 G/DL
ALP BLD-CCNC: 98 U/L
ALT SERPL-CCNC: 18 U/L
ANION GAP SERPL CALC-SCNC: 13 MMOL/L
AST SERPL-CCNC: 26 U/L
BASOPHILS # BLD AUTO: 0.03 K/UL
BASOPHILS NFR BLD AUTO: 0.4 %
BILIRUB SERPL-MCNC: 0.4 MG/DL
BUN SERPL-MCNC: 22 MG/DL
CALCIUM SERPL-MCNC: 9.4 MG/DL
CHLORIDE SERPL-SCNC: 102 MMOL/L
CHOLEST SERPL-MCNC: 161 MG/DL
CO2 SERPL-SCNC: 21 MMOL/L
CREAT SERPL-MCNC: 0.96 MG/DL
EGFR: 77 ML/MIN/1.73M2
EOSINOPHIL # BLD AUTO: 0.24 K/UL
EOSINOPHIL NFR BLD AUTO: 3 %
ESTIMATED AVERAGE GLUCOSE: 120 MG/DL
FOLATE SERPL-MCNC: >20 NG/ML
GLUCOSE SERPL-MCNC: 98 MG/DL
HBA1C MFR BLD HPLC: 5.8 %
HCT VFR BLD CALC: 40.4 %
HDLC SERPL-MCNC: 60 MG/DL
HGB BLD-MCNC: 13 G/DL
IMM GRANULOCYTES NFR BLD AUTO: 0.3 %
LDLC SERPL CALC-MCNC: 84 MG/DL
LYMPHOCYTES # BLD AUTO: 1.47 K/UL
LYMPHOCYTES NFR BLD AUTO: 18.5 %
MAN DIFF?: NORMAL
MCHC RBC-ENTMCNC: 30.7 PG
MCHC RBC-ENTMCNC: 32.2 GM/DL
MCV RBC AUTO: 95.3 FL
MONOCYTES # BLD AUTO: 0.53 K/UL
MONOCYTES NFR BLD AUTO: 6.7 %
NEUTROPHILS # BLD AUTO: 5.67 K/UL
NEUTROPHILS NFR BLD AUTO: 71.1 %
NONHDLC SERPL-MCNC: 101 MG/DL
NT-PROBNP SERPL-MCNC: 208 PG/ML
PLATELET # BLD AUTO: 283 K/UL
POTASSIUM SERPL-SCNC: 4.6 MMOL/L
PROT SERPL-MCNC: 6.5 G/DL
RBC # BLD: 4.24 M/UL
RBC # FLD: 13.2 %
SODIUM SERPL-SCNC: 136 MMOL/L
T4 FREE SERPL-MCNC: 1.5 NG/DL
TRIGL SERPL-MCNC: 83 MG/DL
TSH SERPL-ACNC: 3.3 UIU/ML
URATE SERPL-MCNC: 7.4 MG/DL
VIT B12 SERPL-MCNC: 667 PG/ML
WBC # FLD AUTO: 7.96 K/UL

## 2022-04-11 PROBLEM — Z11.59 SCREENING FOR VIRAL DISEASE: Status: ACTIVE | Noted: 2020-06-15

## 2022-04-28 ENCOUNTER — APPOINTMENT (OUTPATIENT)
Dept: ELECTROPHYSIOLOGY | Facility: CLINIC | Age: 87
End: 2022-04-28
Payer: MEDICARE

## 2022-04-28 ENCOUNTER — NON-APPOINTMENT (OUTPATIENT)
Age: 87
End: 2022-04-28

## 2022-04-28 PROCEDURE — 93296 REM INTERROG EVL PM/IDS: CPT

## 2022-04-28 PROCEDURE — 93295 DEV INTERROG REMOTE 1/2/MLT: CPT

## 2022-04-29 ENCOUNTER — APPOINTMENT (OUTPATIENT)
Dept: UROLOGY | Facility: CLINIC | Age: 87
End: 2022-04-29
Payer: MEDICARE

## 2022-04-29 PROCEDURE — 99212 OFFICE O/P EST SF 10 MIN: CPT

## 2022-04-29 RX ORDER — SILODOSIN 8 MG/1
8 CAPSULE ORAL
Qty: 90 | Refills: 3 | Status: COMPLETED | COMMUNITY
Start: 2017-06-16 | End: 2022-04-29

## 2022-04-29 NOTE — HISTORY OF PRESENT ILLNESS
[FreeTextEntry1] : Patient following up for h/o UTIs. First one resulted in hospitalization at [facility] for urosepsis. He had a Flynn placed but not for obvious retention.He was on Flomax for a bit after passing TOV - he of off it and back to baseline. . At baseline he had intermittently slow stream but no straining, hesitancy or stop/start. Some frequency but no nocturia or incontinence. He had emergency cholecystectomy earlier this year and failed first TOV. He has h/o prostate cancer treated with XRT 2010.  \par \par I put him back on alpha blocker and was voiding well. A follow up  visit urine culture positive for Staph Aureus and treated.Then had another UTI treated in hospital. Voiding well now but has low grade fever and sensation - uncomfortable in urethra. No dysuria. He is on Rapaflo and f/u USG ok with no stones or PVR. Cysto oK.  I have noted higher PVRs and given cysto findings suspect some degree of neurogenic bladder. Has been on Hiprex and no UTIs since (2017).\par \par here last visit with discomfort in the urethra - not when voiding. No discharge or hematuria. sits to void else nothing new. Here for f/u - no UTI since 10/2017. No symptoms of UTI; dysuria or urgency - no discomfort urine has been clear. 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 No dysuria or hematuria. Wakes up at night but no to void. using the lubricated catheters - much more comfortable\par \par 9/21 - here with urethral discomfort - has been cathing more frequently as didn't feel voiding enough. As such having more urethral discomfort with no bleeding or discharge. urine seems clear.\par Still on Methenamine and Silodosin. \par \par 4/22 caths am and PM only - occasionally midday if has discomfort Voids some when having a BM else nothing. No leaking between or at night. no dysuria or hematuria.\par NO UTIs or UTI symptoms . On methenamine \par  \par  \par  \par  \par   \par

## 2022-05-13 ENCOUNTER — NON-APPOINTMENT (OUTPATIENT)
Age: 87
End: 2022-05-13

## 2022-05-13 ENCOUNTER — APPOINTMENT (OUTPATIENT)
Dept: PULMONOLOGY | Facility: CLINIC | Age: 87
End: 2022-05-13

## 2022-05-26 ENCOUNTER — APPOINTMENT (OUTPATIENT)
Dept: ULTRASOUND IMAGING | Facility: IMAGING CENTER | Age: 87
End: 2022-05-26
Payer: MEDICARE

## 2022-05-26 ENCOUNTER — OUTPATIENT (OUTPATIENT)
Dept: OUTPATIENT SERVICES | Facility: HOSPITAL | Age: 87
LOS: 1 days | End: 2022-05-26
Payer: MEDICARE

## 2022-05-26 DIAGNOSIS — Z00.8 ENCOUNTER FOR OTHER GENERAL EXAMINATION: ICD-10-CM

## 2022-05-26 PROCEDURE — 93971 EXTREMITY STUDY: CPT | Mod: 26,LT

## 2022-05-26 PROCEDURE — 93971 EXTREMITY STUDY: CPT

## 2022-06-04 ENCOUNTER — NON-APPOINTMENT (OUTPATIENT)
Age: 87
End: 2022-06-04

## 2022-06-08 ENCOUNTER — RX RENEWAL (OUTPATIENT)
Age: 87
End: 2022-06-08

## 2022-07-06 ENCOUNTER — APPOINTMENT (OUTPATIENT)
Dept: ELECTROPHYSIOLOGY | Facility: CLINIC | Age: 87
End: 2022-07-06

## 2022-07-06 PROCEDURE — 93290 INTERROG DEV EVAL ICPMS IP: CPT | Mod: 26

## 2022-07-06 PROCEDURE — 93283 PRGRMG EVAL IMPLANTABLE DFB: CPT

## 2022-07-06 RX ORDER — SACUBITRIL AND VALSARTAN 24; 26 MG/1; MG/1
24-26 TABLET, FILM COATED ORAL DAILY
Refills: 0 | Status: ACTIVE | COMMUNITY

## 2022-07-06 RX ORDER — METHENAMINE HIPPURATE 1 G/1
1 TABLET ORAL
Qty: 90 | Refills: 0 | Status: DISCONTINUED | COMMUNITY
Start: 2017-10-24 | End: 2022-07-06

## 2022-07-15 ENCOUNTER — APPOINTMENT (OUTPATIENT)
Dept: INTERNAL MEDICINE | Facility: CLINIC | Age: 87
End: 2022-07-15

## 2022-07-15 VITALS
BODY MASS INDEX: 24.65 KG/M2 | HEIGHT: 73 IN | OXYGEN SATURATION: 94 % | HEART RATE: 81 BPM | TEMPERATURE: 97.8 F | SYSTOLIC BLOOD PRESSURE: 102 MMHG | WEIGHT: 186 LBS | DIASTOLIC BLOOD PRESSURE: 67 MMHG

## 2022-07-15 VITALS
BODY MASS INDEX: 24.52 KG/M2 | WEIGHT: 185 LBS | SYSTOLIC BLOOD PRESSURE: 80 MMHG | HEIGHT: 73 IN | DIASTOLIC BLOOD PRESSURE: 50 MMHG | TEMPERATURE: 98 F

## 2022-07-15 PROCEDURE — 99214 OFFICE O/P EST MOD 30 MIN: CPT | Mod: 25

## 2022-07-15 PROCEDURE — 36415 COLL VENOUS BLD VENIPUNCTURE: CPT

## 2022-07-15 RX ORDER — FUROSEMIDE 20 MG/1
20 TABLET ORAL
Qty: 180 | Refills: 0 | Status: ACTIVE | COMMUNITY

## 2022-07-15 RX ORDER — SILODOSIN 8 MG/1
8 CAPSULE ORAL DAILY
Qty: 90 | Refills: 0 | Status: DISCONTINUED | COMMUNITY
Start: 2022-07-15 | End: 2022-07-15

## 2022-07-15 RX ORDER — AMOXICILLIN AND CLAVULANATE POTASSIUM 500; 125 MG/1; MG/1
500-125 TABLET, FILM COATED ORAL
Qty: 14 | Refills: 0 | Status: COMPLETED | COMMUNITY
Start: 2022-05-24

## 2022-07-15 NOTE — PHYSICAL EXAM
[Normal Rate] : normal rate  [Regular Rhythm] : with a regular rhythm [Normal] : soft, non-tender, non-distended, no masses palpated, no HSM and normal bowel sounds [de-identified] : left leg more swollen than right

## 2022-07-15 NOTE — HISTORY OF PRESENT ILLNESS
[FreeTextEntry1] : f/u [de-identified] : Sent by Dr Pollard\par low protein\par on entrest only daily\par lasix increased from 20 to 40 \par still on spirolactone\par on rapaflo with no urination imbetween self cat 2 x daily

## 2022-07-15 NOTE — PLAN
[FreeTextEntry1] : check blood\par stop rapaflo and continue self cath bid\par will discuss with card next week\par \par check lyte pro bnp prot and potassium and uric acid\par \par still drinking alcohol

## 2022-07-17 LAB
ALBUMIN SERPL ELPH-MCNC: 3.7 G/DL
ALP BLD-CCNC: 82 U/L
ALT SERPL-CCNC: 15 U/L
ANION GAP SERPL CALC-SCNC: 12 MMOL/L
AST SERPL-CCNC: 20 U/L
BASOPHILS # BLD AUTO: 0.05 K/UL
BASOPHILS NFR BLD AUTO: 0.7 %
BILIRUB SERPL-MCNC: 0.4 MG/DL
BUN SERPL-MCNC: 18 MG/DL
CALCIUM SERPL-MCNC: 9.6 MG/DL
CHLORIDE SERPL-SCNC: 102 MMOL/L
CHOLEST SERPL-MCNC: 160 MG/DL
CO2 SERPL-SCNC: 24 MMOL/L
CREAT SERPL-MCNC: 1.06 MG/DL
EGFR: 68 ML/MIN/1.73M2
EOSINOPHIL # BLD AUTO: 0.32 K/UL
EOSINOPHIL NFR BLD AUTO: 4.4 %
ESTIMATED AVERAGE GLUCOSE: 120 MG/DL
GLUCOSE SERPL-MCNC: 112 MG/DL
HBA1C MFR BLD HPLC: 5.8 %
HCT VFR BLD CALC: 38.5 %
HDLC SERPL-MCNC: 52 MG/DL
HGB BLD-MCNC: 12.5 G/DL
IMM GRANULOCYTES NFR BLD AUTO: 0.3 %
LDLC SERPL CALC-MCNC: 89 MG/DL
LYMPHOCYTES # BLD AUTO: 1.68 K/UL
LYMPHOCYTES NFR BLD AUTO: 23 %
MAGNESIUM SERPL-MCNC: 1.9 MG/DL
MAN DIFF?: NORMAL
MCHC RBC-ENTMCNC: 30.3 PG
MCHC RBC-ENTMCNC: 32.5 GM/DL
MCV RBC AUTO: 93.2 FL
MONOCYTES # BLD AUTO: 0.64 K/UL
MONOCYTES NFR BLD AUTO: 8.7 %
NEUTROPHILS # BLD AUTO: 4.61 K/UL
NEUTROPHILS NFR BLD AUTO: 62.9 %
NONHDLC SERPL-MCNC: 108 MG/DL
NT-PROBNP SERPL-MCNC: 167 PG/ML
PLATELET # BLD AUTO: 246 K/UL
POTASSIUM SERPL-SCNC: 4 MMOL/L
PROT SERPL-MCNC: 6.1 G/DL
RBC # BLD: 4.13 M/UL
RBC # FLD: 13.1 %
SODIUM SERPL-SCNC: 138 MMOL/L
T4 FREE SERPL-MCNC: 1.7 NG/DL
TRIGL SERPL-MCNC: 95 MG/DL
TSH SERPL-ACNC: 3.35 UIU/ML
URATE SERPL-MCNC: 7.9 MG/DL
WBC # FLD AUTO: 7.32 K/UL

## 2022-09-02 ENCOUNTER — RX RENEWAL (OUTPATIENT)
Age: 87
End: 2022-09-02

## 2022-09-22 ENCOUNTER — APPOINTMENT (OUTPATIENT)
Dept: INTERNAL MEDICINE | Facility: CLINIC | Age: 87
End: 2022-09-22

## 2022-09-29 ENCOUNTER — RX RENEWAL (OUTPATIENT)
Age: 87
End: 2022-09-29

## 2022-10-12 ENCOUNTER — NON-APPOINTMENT (OUTPATIENT)
Age: 87
End: 2022-10-12

## 2022-10-12 ENCOUNTER — APPOINTMENT (OUTPATIENT)
Dept: ELECTROPHYSIOLOGY | Facility: CLINIC | Age: 87
End: 2022-10-12

## 2022-10-12 PROCEDURE — 93296 REM INTERROG EVL PM/IDS: CPT

## 2022-10-12 PROCEDURE — 93295 DEV INTERROG REMOTE 1/2/MLT: CPT

## 2022-10-13 ENCOUNTER — APPOINTMENT (OUTPATIENT)
Dept: INTERNAL MEDICINE | Facility: CLINIC | Age: 87
End: 2022-10-13

## 2022-10-20 ENCOUNTER — APPOINTMENT (OUTPATIENT)
Dept: INTERNAL MEDICINE | Facility: CLINIC | Age: 87
End: 2022-10-20

## 2022-10-28 ENCOUNTER — APPOINTMENT (OUTPATIENT)
Dept: INTERNAL MEDICINE | Facility: CLINIC | Age: 87
End: 2022-10-28

## 2022-10-28 VITALS
BODY MASS INDEX: 24.52 KG/M2 | HEART RATE: 43 BPM | TEMPERATURE: 97.9 F | SYSTOLIC BLOOD PRESSURE: 137 MMHG | OXYGEN SATURATION: 95 % | DIASTOLIC BLOOD PRESSURE: 86 MMHG | WEIGHT: 185 LBS | HEIGHT: 73 IN

## 2022-10-28 PROCEDURE — 99214 OFFICE O/P EST MOD 30 MIN: CPT | Mod: 25

## 2022-10-28 PROCEDURE — G0008: CPT

## 2022-10-28 PROCEDURE — 36415 COLL VENOUS BLD VENIPUNCTURE: CPT

## 2022-10-28 PROCEDURE — 90662 IIV NO PRSV INCREASED AG IM: CPT

## 2022-10-28 NOTE — PHYSICAL EXAM
[Normal] : no carotid or abdominal bruits heard, no varicosities, pedal pulses are present, no peripheral edema, no extremity clubbing or cyanosis and no palpable aorta [de-identified] : nsr

## 2022-10-28 NOTE — HISTORY OF PRESENT ILLNESS
[FreeTextEntry1] : f/u [de-identified] : still drinking\par on advair breathing good\par aicd no discharge

## 2022-10-28 NOTE — PLAN
[FreeTextEntry1] : on entresto\par no sob or cp\par still drinking alchohol\par f/u Dr Varela and eps Lij\par check pro bnp and mag and lytes\par on advair breathing good\par

## 2022-10-29 LAB
ALBUMIN SERPL ELPH-MCNC: 3.7 G/DL
ALP BLD-CCNC: 95 U/L
ALT SERPL-CCNC: 16 U/L
ANION GAP SERPL CALC-SCNC: 11 MMOL/L
AST SERPL-CCNC: 26 U/L
BASOPHILS # BLD AUTO: 0.06 K/UL
BASOPHILS NFR BLD AUTO: 0.7 %
BILIRUB SERPL-MCNC: 0.4 MG/DL
BUN SERPL-MCNC: 14 MG/DL
CALCIUM SERPL-MCNC: 9.4 MG/DL
CHLORIDE SERPL-SCNC: 100 MMOL/L
CHOLEST SERPL-MCNC: 169 MG/DL
CO2 SERPL-SCNC: 25 MMOL/L
CREAT SERPL-MCNC: 0.85 MG/DL
EGFR: 85 ML/MIN/1.73M2
EOSINOPHIL # BLD AUTO: 0.29 K/UL
EOSINOPHIL NFR BLD AUTO: 3.2 %
ESTIMATED AVERAGE GLUCOSE: 111 MG/DL
GLUCOSE SERPL-MCNC: 96 MG/DL
HBA1C MFR BLD HPLC: 5.5 %
HCT VFR BLD CALC: 38.3 %
HDLC SERPL-MCNC: 56 MG/DL
HGB BLD-MCNC: 12.9 G/DL
IMM GRANULOCYTES NFR BLD AUTO: 0.2 %
LDLC SERPL CALC-MCNC: 97 MG/DL
LYMPHOCYTES # BLD AUTO: 1.47 K/UL
LYMPHOCYTES NFR BLD AUTO: 16.4 %
MAGNESIUM SERPL-MCNC: 1.7 MG/DL
MAN DIFF?: NORMAL
MCHC RBC-ENTMCNC: 30.7 PG
MCHC RBC-ENTMCNC: 33.7 GM/DL
MCV RBC AUTO: 91.2 FL
MONOCYTES # BLD AUTO: 0.54 K/UL
MONOCYTES NFR BLD AUTO: 6 %
NEUTROPHILS # BLD AUTO: 6.6 K/UL
NEUTROPHILS NFR BLD AUTO: 73.5 %
NONHDLC SERPL-MCNC: 113 MG/DL
NT-PROBNP SERPL-MCNC: 256 PG/ML
PLATELET # BLD AUTO: 265 K/UL
POTASSIUM SERPL-SCNC: 4.3 MMOL/L
PROT SERPL-MCNC: 6.1 G/DL
RBC # BLD: 4.2 M/UL
RBC # FLD: 13.4 %
SODIUM SERPL-SCNC: 137 MMOL/L
T4 FREE SERPL-MCNC: 1.4 NG/DL
TRIGL SERPL-MCNC: 80 MG/DL
TSH SERPL-ACNC: 1.81 UIU/ML
WBC # FLD AUTO: 8.98 K/UL

## 2022-11-15 ENCOUNTER — RX RENEWAL (OUTPATIENT)
Age: 87
End: 2022-11-15

## 2022-12-12 ENCOUNTER — NON-APPOINTMENT (OUTPATIENT)
Age: 87
End: 2022-12-12

## 2022-12-12 ENCOUNTER — APPOINTMENT (OUTPATIENT)
Dept: OPHTHALMOLOGY | Facility: CLINIC | Age: 87
End: 2022-12-12

## 2022-12-12 PROCEDURE — 92014 COMPRE OPH EXAM EST PT 1/>: CPT

## 2022-12-12 PROCEDURE — 92134 CPTRZ OPH DX IMG PST SGM RTA: CPT

## 2022-12-27 ENCOUNTER — RX RENEWAL (OUTPATIENT)
Age: 87
End: 2022-12-27

## 2023-01-11 ENCOUNTER — APPOINTMENT (OUTPATIENT)
Dept: ELECTROPHYSIOLOGY | Facility: CLINIC | Age: 88
End: 2023-01-11
Payer: MEDICARE

## 2023-01-11 ENCOUNTER — NON-APPOINTMENT (OUTPATIENT)
Age: 88
End: 2023-01-11

## 2023-01-11 PROCEDURE — 93296 REM INTERROG EVL PM/IDS: CPT

## 2023-01-11 PROCEDURE — 93295 DEV INTERROG REMOTE 1/2/MLT: CPT

## 2023-02-16 ENCOUNTER — RX RENEWAL (OUTPATIENT)
Age: 88
End: 2023-02-16

## 2023-02-16 RX ORDER — FLUTICASONE PROPIONATE AND SALMETEROL 500; 50 UG/1; UG/1
500-50 POWDER RESPIRATORY (INHALATION)
Qty: 60 | Refills: 11 | Status: ACTIVE | COMMUNITY
Start: 2018-11-26 | End: 1900-01-01

## 2023-04-12 ENCOUNTER — NON-APPOINTMENT (OUTPATIENT)
Age: 88
End: 2023-04-12

## 2023-04-12 ENCOUNTER — APPOINTMENT (OUTPATIENT)
Dept: ELECTROPHYSIOLOGY | Facility: CLINIC | Age: 88
End: 2023-04-12
Payer: MEDICARE

## 2023-04-12 PROCEDURE — 93296 REM INTERROG EVL PM/IDS: CPT

## 2023-04-12 PROCEDURE — 93295 DEV INTERROG REMOTE 1/2/MLT: CPT

## 2023-05-23 ENCOUNTER — APPOINTMENT (OUTPATIENT)
Dept: UROLOGY | Facility: CLINIC | Age: 88
End: 2023-05-23
Payer: MEDICARE

## 2023-05-23 PROCEDURE — 99213 OFFICE O/P EST LOW 20 MIN: CPT

## 2023-05-23 NOTE — HISTORY OF PRESENT ILLNESS
[FreeTextEntry1] : Patient following up for h/o UTIs. First one resulted in hospitalization at [facility] for urosepsis. He had a Flynn placed but not for obvious retention.He was on Flomax for a bit after passing TOV - he of off it and back to baseline. . At baseline he had intermittently slow stream but no straining, hesitancy or stop/start. Some frequency but no nocturia or incontinence. He had emergency cholecystectomy earlier this year and failed first TOV. He has h/o prostate cancer treated with XRT 2010.  \par \par I put him back on alpha blocker and was voiding well. A follow up  visit urine culture positive for Staph Aureus and treated.Then had another UTI treated in hospital. Voiding well now but has low grade fever and sensation - uncomfortable in urethra. No dysuria. He is on Rapaflo and f/u USG ok with no stones or PVR. Cysto oK.  I have noted higher PVRs and given cysto findings suspect some degree of neurogenic bladder. Has been on Hiprex and no UTIs since (2017).\par \par here last visit with discomfort in the urethra - not when voiding. No discharge or hematuria. sits to void else nothing new. Here for f/u - no UTI since 10/2017. No symptoms of UTI; dysuria or urgency - no discomfort urine has been clear. 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 No dysuria or hematuria. Wakes up at night but no to void. using the lubricated catheters - much more comfortable\par \par 9/21 - here with urethral discomfort - has been cathing more frequently as didn't feel voiding enough. As such having more urethral discomfort with no bleeding or discharge. urine seems clear.\par Still on Methenamine and Silodosin. \par \par 4/22 caths am and PM only - occasionally midday if has discomfort Voids some when having a BM else nothing. No leaking between or at night. no dysuria or hematuria.\par NO UTIs or UTI symptoms . On methenamine \par  \par 5/23 - on CIC BID  - as above voids a bit when has a BM\par No hematuria, UTI symptoms or UTIs. \par  \par  \par  \par   \par

## 2023-05-25 ENCOUNTER — NON-APPOINTMENT (OUTPATIENT)
Age: 88
End: 2023-05-25

## 2023-05-26 ENCOUNTER — NON-APPOINTMENT (OUTPATIENT)
Age: 88
End: 2023-05-26

## 2023-05-26 LAB — BACTERIA UR CULT: ABNORMAL

## 2023-05-26 RX ORDER — CLINDAMYCIN HYDROCHLORIDE 300 MG/1
300 CAPSULE ORAL EVERY 6 HOURS
Qty: 28 | Refills: 0 | Status: ACTIVE | COMMUNITY
Start: 2023-05-26 | End: 1900-01-01

## 2023-06-23 ENCOUNTER — NON-APPOINTMENT (OUTPATIENT)
Age: 88
End: 2023-06-23

## 2023-06-23 ENCOUNTER — APPOINTMENT (OUTPATIENT)
Dept: OPHTHALMOLOGY | Facility: CLINIC | Age: 88
End: 2023-06-23
Payer: MEDICARE

## 2023-06-23 PROCEDURE — 92134 CPTRZ OPH DX IMG PST SGM RTA: CPT

## 2023-06-23 PROCEDURE — 92012 INTRM OPH EXAM EST PATIENT: CPT

## 2023-07-26 ENCOUNTER — NON-APPOINTMENT (OUTPATIENT)
Age: 88
End: 2023-07-26

## 2023-07-26 ENCOUNTER — APPOINTMENT (OUTPATIENT)
Dept: ELECTROPHYSIOLOGY | Facility: CLINIC | Age: 88
End: 2023-07-26
Payer: MEDICARE

## 2023-07-26 VITALS — HEART RATE: 68 BPM | DIASTOLIC BLOOD PRESSURE: 52 MMHG | SYSTOLIC BLOOD PRESSURE: 95 MMHG | RESPIRATION RATE: 14 BRPM

## 2023-07-26 PROCEDURE — 93290 INTERROG DEV EVAL ICPMS IP: CPT | Mod: 26

## 2023-07-26 PROCEDURE — 93283 PRGRMG EVAL IMPLANTABLE DFB: CPT

## 2023-09-14 ENCOUNTER — APPOINTMENT (OUTPATIENT)
Dept: INTERNAL MEDICINE | Facility: CLINIC | Age: 88
End: 2023-09-14
Payer: MEDICARE

## 2023-09-14 VITALS — HEIGHT: 73 IN | DIASTOLIC BLOOD PRESSURE: 60 MMHG | BODY MASS INDEX: 23.75 KG/M2 | SYSTOLIC BLOOD PRESSURE: 96 MMHG

## 2023-09-14 VITALS
DIASTOLIC BLOOD PRESSURE: 61 MMHG | HEIGHT: 73 IN | BODY MASS INDEX: 23.86 KG/M2 | TEMPERATURE: 97.7 F | OXYGEN SATURATION: 94 % | WEIGHT: 180 LBS | SYSTOLIC BLOOD PRESSURE: 108 MMHG | HEART RATE: 54 BPM

## 2023-09-14 DIAGNOSIS — E03.9 HYPOTHYROIDISM, UNSPECIFIED: ICD-10-CM

## 2023-09-14 DIAGNOSIS — F10.10 ALCOHOL ABUSE, UNCOMPLICATED: ICD-10-CM

## 2023-09-14 DIAGNOSIS — F10.97: ICD-10-CM

## 2023-09-14 DIAGNOSIS — Z00.00 ENCOUNTER FOR GENERAL ADULT MEDICAL EXAMINATION W/OUT ABNORMAL FINDINGS: ICD-10-CM

## 2023-09-14 DIAGNOSIS — Z95.810 PRESENCE OF AUTOMATIC (IMPLANTABLE) CARDIAC DEFIBRILLATOR: ICD-10-CM

## 2023-09-14 DIAGNOSIS — I50.9 HEART FAILURE, UNSPECIFIED: ICD-10-CM

## 2023-09-14 DIAGNOSIS — I10 ESSENTIAL (PRIMARY) HYPERTENSION: ICD-10-CM

## 2023-09-14 DIAGNOSIS — M10.9 GOUT, UNSPECIFIED: ICD-10-CM

## 2023-09-14 PROCEDURE — G0439: CPT

## 2023-09-14 PROCEDURE — 36415 COLL VENOUS BLD VENIPUNCTURE: CPT

## 2023-09-14 RX ORDER — SPIRONOLACTONE 25 MG/1
25 TABLET ORAL TWICE DAILY
Qty: 180 | Refills: 1 | Status: ACTIVE | COMMUNITY
Start: 2020-06-15

## 2023-09-17 LAB
ALBUMIN SERPL ELPH-MCNC: 3.7 G/DL
ALP BLD-CCNC: 103 U/L
ALT SERPL-CCNC: 12 U/L
ANION GAP SERPL CALC-SCNC: 11 MMOL/L
AST SERPL-CCNC: 21 U/L
BILIRUB SERPL-MCNC: 0.4 MG/DL
BUN SERPL-MCNC: 11 MG/DL
CALCIUM SERPL-MCNC: 9.3 MG/DL
CHLORIDE SERPL-SCNC: 100 MMOL/L
CHOLEST SERPL-MCNC: 159 MG/DL
CO2 SERPL-SCNC: 27 MMOL/L
CREAT SERPL-MCNC: 0.78 MG/DL
EGFR: 86 ML/MIN/1.73M2
ESTIMATED AVERAGE GLUCOSE: 120 MG/DL
FOLATE SERPL-MCNC: 13.8 NG/ML
GLUCOSE SERPL-MCNC: 114 MG/DL
HBA1C MFR BLD HPLC: 5.8 %
HCT VFR BLD CALC: 40.5 %
HDLC SERPL-MCNC: 49 MG/DL
HGB BLD-MCNC: 13 G/DL
LDLC SERPL CALC-MCNC: 90 MG/DL
MCHC RBC-ENTMCNC: 30.2 PG
MCHC RBC-ENTMCNC: 32.1 GM/DL
MCV RBC AUTO: 94 FL
NONHDLC SERPL-MCNC: 110 MG/DL
NT-PROBNP SERPL-MCNC: 313 PG/ML
PLATELET # BLD AUTO: 274 K/UL
POTASSIUM SERPL-SCNC: 4.2 MMOL/L
PROT SERPL-MCNC: 6.1 G/DL
RBC # BLD: 4.31 M/UL
RBC # FLD: 13.4 %
SODIUM SERPL-SCNC: 137 MMOL/L
T4 FREE SERPL-MCNC: 1.4 NG/DL
TRIGL SERPL-MCNC: 106 MG/DL
TSH SERPL-ACNC: 2.85 UIU/ML
VIT B12 SERPL-MCNC: >2000 PG/ML
WBC # FLD AUTO: 8.18 K/UL

## 2023-09-21 LAB — VIT B1 SERPL-MCNC: 123.6 NMOL/L

## 2023-10-01 PROBLEM — I63.81 LACUNAR INFARCTION: Status: RESOLVED | Noted: 2019-06-03 | Resolved: 2023-10-01

## 2023-10-18 ENCOUNTER — APPOINTMENT (OUTPATIENT)
Dept: INTERNAL MEDICINE | Facility: CLINIC | Age: 88
End: 2023-10-18
Payer: MEDICARE

## 2023-10-18 DIAGNOSIS — Z23 ENCOUNTER FOR IMMUNIZATION: ICD-10-CM

## 2023-10-18 PROCEDURE — G0008: CPT

## 2023-10-18 PROCEDURE — 90662 IIV NO PRSV INCREASED AG IM: CPT

## 2023-10-18 RX ORDER — LEVOTHYROXINE SODIUM 0.07 MG/1
75 TABLET ORAL
Qty: 90 | Refills: 2 | Status: ACTIVE | COMMUNITY
Start: 2022-06-05 | End: 1900-01-01

## 2023-10-25 ENCOUNTER — NON-APPOINTMENT (OUTPATIENT)
Age: 88
End: 2023-10-25

## 2023-10-25 ENCOUNTER — APPOINTMENT (OUTPATIENT)
Dept: ELECTROPHYSIOLOGY | Facility: CLINIC | Age: 88
End: 2023-10-25
Payer: MEDICARE

## 2023-10-25 PROCEDURE — 93296 REM INTERROG EVL PM/IDS: CPT

## 2023-10-25 PROCEDURE — 93295 DEV INTERROG REMOTE 1/2/MLT: CPT

## 2023-10-31 ENCOUNTER — APPOINTMENT (OUTPATIENT)
Dept: PULMONOLOGY | Facility: CLINIC | Age: 88
End: 2023-10-31

## 2023-12-01 ENCOUNTER — APPOINTMENT (OUTPATIENT)
Dept: PULMONOLOGY | Facility: CLINIC | Age: 88
End: 2023-12-01
Payer: MEDICARE

## 2023-12-01 VITALS
DIASTOLIC BLOOD PRESSURE: 56 MMHG | BODY MASS INDEX: 23.86 KG/M2 | HEART RATE: 83 BPM | RESPIRATION RATE: 16 BRPM | TEMPERATURE: 97.5 F | WEIGHT: 180 LBS | OXYGEN SATURATION: 95 % | HEIGHT: 73 IN | SYSTOLIC BLOOD PRESSURE: 94 MMHG

## 2023-12-01 DIAGNOSIS — J44.9 CHRONIC OBSTRUCTIVE PULMONARY DISEASE, UNSPECIFIED: ICD-10-CM

## 2023-12-01 PROCEDURE — 99213 OFFICE O/P EST LOW 20 MIN: CPT

## 2023-12-01 RX ORDER — FLUTICASONE PROPIONATE 50 UG/1
50 SPRAY, METERED NASAL TWICE DAILY
Qty: 1 | Refills: 6 | Status: ACTIVE | COMMUNITY
Start: 2023-12-01 | End: 1900-01-01

## 2023-12-01 RX ORDER — AZELASTINE HYDROCHLORIDE 205.5 UG/1
0.15 SPRAY, METERED NASAL
Qty: 1 | Refills: 6 | Status: ACTIVE | COMMUNITY
Start: 2023-12-01 | End: 1900-01-01

## 2023-12-26 RX ORDER — FLUTICASONE FUROATE AND VILANTEROL TRIFENATATE 100; 25 UG/1; UG/1
100-25 POWDER RESPIRATORY (INHALATION)
Qty: 1 | Refills: 3 | Status: ACTIVE | COMMUNITY
Start: 2023-12-26 | End: 1900-01-01

## 2024-01-04 RX ORDER — FLUTICASONE FUROATE AND VILANTEROL TRIFENATATE 200; 25 UG/1; UG/1
200-25 POWDER RESPIRATORY (INHALATION)
Qty: 3 | Refills: 3 | Status: ACTIVE | COMMUNITY
Start: 2024-01-04 | End: 1900-01-01

## 2024-01-23 ENCOUNTER — RX RENEWAL (OUTPATIENT)
Age: 89
End: 2024-01-23

## 2024-01-23 RX ORDER — ALBUTEROL SULFATE 90 UG/1
108 (90 BASE) INHALANT RESPIRATORY (INHALATION)
Qty: 1 | Refills: 11 | Status: ACTIVE | COMMUNITY
Start: 2017-06-13 | End: 1900-01-01

## 2024-01-24 ENCOUNTER — APPOINTMENT (OUTPATIENT)
Dept: ELECTROPHYSIOLOGY | Facility: CLINIC | Age: 89
End: 2024-01-24
Payer: MEDICARE

## 2024-01-24 ENCOUNTER — NON-APPOINTMENT (OUTPATIENT)
Age: 89
End: 2024-01-24

## 2024-01-24 PROCEDURE — 93295 DEV INTERROG REMOTE 1/2/MLT: CPT

## 2024-01-24 PROCEDURE — 93296 REM INTERROG EVL PM/IDS: CPT

## 2024-02-09 ENCOUNTER — APPOINTMENT (OUTPATIENT)
Dept: OPHTHALMOLOGY | Facility: CLINIC | Age: 89
End: 2024-02-09

## 2024-03-08 ENCOUNTER — APPOINTMENT (OUTPATIENT)
Dept: OPHTHALMOLOGY | Facility: CLINIC | Age: 89
End: 2024-03-08

## 2024-04-19 ENCOUNTER — APPOINTMENT (OUTPATIENT)
Dept: UROLOGY | Facility: CLINIC | Age: 89
End: 2024-04-19
Payer: MEDICARE

## 2024-04-19 DIAGNOSIS — R33.9 RETENTION OF URINE, UNSPECIFIED: ICD-10-CM

## 2024-04-19 DIAGNOSIS — N40.0 BENIGN PROSTATIC HYPERPLASIA WITHOUT LOWER URINARY TRACT SYMPMS: ICD-10-CM

## 2024-04-19 PROCEDURE — 99213 OFFICE O/P EST LOW 20 MIN: CPT

## 2024-04-19 PROCEDURE — 51798 US URINE CAPACITY MEASURE: CPT

## 2024-04-19 PROCEDURE — G2211 COMPLEX E/M VISIT ADD ON: CPT

## 2024-04-20 LAB
APPEARANCE: CLEAR
BACTERIA: NEGATIVE /HPF
BILIRUBIN URINE: NEGATIVE
BLOOD URINE: NEGATIVE
CAST: 0 /LPF
COLOR: YELLOW
EPITHELIAL CELLS: 4 /HPF
GLUCOSE QUALITATIVE U: NEGATIVE MG/DL
KETONES URINE: NEGATIVE MG/DL
LEUKOCYTE ESTERASE URINE: NEGATIVE
MICROSCOPIC-UA: NORMAL
NITRITE URINE: NEGATIVE
PH URINE: 6.5
PROTEIN URINE: NEGATIVE MG/DL
RED BLOOD CELLS URINE: 2 /HPF
SPECIFIC GRAVITY URINE: 1.01
UROBILINOGEN URINE: 0.2 MG/DL
WHITE BLOOD CELLS URINE: 1 /HPF

## 2024-04-22 PROBLEM — R33.9 INCOMPLETE BLADDER EMPTYING: Status: ACTIVE | Noted: 2019-10-16

## 2024-04-22 PROBLEM — N40.0 BPH (BENIGN PROSTATIC HYPERPLASIA): Status: ACTIVE | Noted: 2018-08-08

## 2024-04-22 PROBLEM — R33.9 URINARY RETENTION: Status: ACTIVE | Noted: 2020-07-07

## 2024-04-22 LAB — BACTERIA UR CULT: NORMAL

## 2024-04-22 NOTE — PHYSICAL EXAM
[Oriented To Time, Place, And Person] : oriented to person, place, and time [Affect] : the affect was normal [Mood] : the mood was normal [de-identified] : PVR after 100ml of spontaneous urination was 580. Straight cath performed and repeat PVR was 0 ml

## 2024-04-22 NOTE — HISTORY OF PRESENT ILLNESS
[FreeTextEntry1] : Patient following up for h/o UTIs. First one resulted in hospitalization at [facility] for urosepsis. He had a Flynn placed but not for obvious retention.He was on Flomax for a bit after passing TOV - he of off it and back to baseline. . At baseline he had intermittently slow stream but no straining, hesitancy or stop/start. Some frequency but no nocturia or incontinence. He had emergency cholecystectomy earlier this year and failed first TOV. He has h/o prostate cancer treated with XRT 2010.    I put him back on alpha blocker and was voiding well. A follow up  visit urine culture positive for Staph Aureus and treated.Then had another UTI treated in hospital. Voiding well now but has low grade fever and sensation - uncomfortable in urethra. No dysuria. He is on Rapaflo and f/u USG ok with no stones or PVR. Cysto oK.  I have noted higher PVRs and given cysto findings suspect some degree of neurogenic bladder. Has been on Hiprex and no UTIs since (2017).  here last visit with discomfort in the urethra - not when voiding. No discharge or hematuria. sits to void else nothing new. Here for f/u - no UTI since 10/2017. No symptoms of UTI; dysuria or urgency - no discomfort urine has been clear. PVR 510cc - much higher than in past  Taught him CIC BID - doing without issues; voiding less between - maybe twice. No dysuria or hematuria. Wakes up at night but no to void. using the lubricated catheters - much more comfortable  9/21 - here with urethral discomfort - has been cathing more frequently as didn't feel voiding enough. As such having more urethral discomfort with no bleeding or discharge. urine seems clear. Still on Methenamine and Silodosin.   4/22 caths am and PM only - occasionally midday if has discomfort Voids some when having a BM else nothing. No leaking between or at night. no dysuria or hematuria. NO UTIs or UTI symptoms . On methenamine    5/23 - on CIC BID  - as above voids a bit when has a BM No hematuria, UTI symptoms or UTIs.   4/19/24 - Here for yearly f/u. informs that he now takes AZO 2x daily because he was having burning urination. Informs that he used to self-cath but he no longer has to as he urinates spontaneously.  Denies fever, chills  PVR 681ml Spontaneous urination 100ml

## 2024-04-24 ENCOUNTER — APPOINTMENT (OUTPATIENT)
Dept: ELECTROPHYSIOLOGY | Facility: CLINIC | Age: 89
End: 2024-04-24
Payer: MEDICARE

## 2024-04-24 ENCOUNTER — NON-APPOINTMENT (OUTPATIENT)
Age: 89
End: 2024-04-24

## 2024-04-24 PROCEDURE — 93295 DEV INTERROG REMOTE 1/2/MLT: CPT

## 2024-04-24 PROCEDURE — 93296 REM INTERROG EVL PM/IDS: CPT

## 2024-05-10 ENCOUNTER — NON-APPOINTMENT (OUTPATIENT)
Age: 89
End: 2024-05-10

## 2024-05-10 ENCOUNTER — APPOINTMENT (OUTPATIENT)
Dept: OPHTHALMOLOGY | Facility: CLINIC | Age: 89
End: 2024-05-10
Payer: MEDICARE

## 2024-05-10 PROCEDURE — 92014 COMPRE OPH EXAM EST PT 1/>: CPT

## 2024-05-10 PROCEDURE — 92134 CPTRZ OPH DX IMG PST SGM RTA: CPT

## 2024-05-22 ENCOUNTER — APPOINTMENT (OUTPATIENT)
Dept: INTERNAL MEDICINE | Facility: CLINIC | Age: 89
End: 2024-05-22

## 2024-05-26 ENCOUNTER — RX RENEWAL (OUTPATIENT)
Age: 89
End: 2024-05-26

## 2024-05-26 RX ORDER — METHENAMINE HIPPURATE 1 G/1
1 TABLET ORAL
Qty: 90 | Refills: 1 | Status: ACTIVE | COMMUNITY
Start: 2022-09-29 | End: 1900-01-01

## 2024-06-13 NOTE — H&P ADULT - PROBLEM SELECTOR PROBLEM 6
Frederick Chang is a 67 y.o. male follow up for polycythemia.      1. Have you been to the ER, urgent care clinic since your last visit?  Hospitalized since your last visit?no    2. Have you seen or consulted any other health care providers outside of the [facility] since your last visit?  Include any pap smears or colon screening. no     Cerebrovascular accident (CVA), unspecified mechanism

## 2024-07-24 ENCOUNTER — APPOINTMENT (OUTPATIENT)
Dept: ELECTROPHYSIOLOGY | Facility: CLINIC | Age: 89
End: 2024-07-24
Payer: MEDICARE

## 2024-07-24 ENCOUNTER — NON-APPOINTMENT (OUTPATIENT)
Age: 89
End: 2024-07-24

## 2024-07-24 VITALS — DIASTOLIC BLOOD PRESSURE: 52 MMHG | SYSTOLIC BLOOD PRESSURE: 126 MMHG | HEART RATE: 71 BPM

## 2024-07-24 DIAGNOSIS — I50.9 HEART FAILURE, UNSPECIFIED: ICD-10-CM

## 2024-07-24 PROCEDURE — 93283 PRGRMG EVAL IMPLANTABLE DFB: CPT

## 2024-07-24 RX ORDER — VIT C/E/ZN/COPPR/LUTEIN/ZEAXAN 250MG-90MG
CAPSULE ORAL
Refills: 0 | Status: ACTIVE | COMMUNITY

## 2024-08-12 ENCOUNTER — NON-APPOINTMENT (OUTPATIENT)
Age: 89
End: 2024-08-12

## 2024-08-30 ENCOUNTER — APPOINTMENT (OUTPATIENT)
Dept: OPHTHALMOLOGY | Facility: CLINIC | Age: 89
End: 2024-08-30

## 2024-08-30 ENCOUNTER — NON-APPOINTMENT (OUTPATIENT)
Age: 89
End: 2024-08-30

## 2024-08-30 PROCEDURE — 92012 INTRM OPH EXAM EST PATIENT: CPT

## 2024-09-05 ENCOUNTER — RX RENEWAL (OUTPATIENT)
Age: 89
End: 2024-09-05

## 2024-10-23 ENCOUNTER — APPOINTMENT (OUTPATIENT)
Dept: UROLOGY | Facility: CLINIC | Age: 89
End: 2024-10-23

## 2024-10-23 ENCOUNTER — APPOINTMENT (OUTPATIENT)
Dept: ELECTROPHYSIOLOGY | Facility: CLINIC | Age: 89
End: 2024-10-23
Payer: MEDICARE

## 2024-10-23 ENCOUNTER — NON-APPOINTMENT (OUTPATIENT)
Age: 89
End: 2024-10-23

## 2024-10-23 PROCEDURE — 93295 DEV INTERROG REMOTE 1/2/MLT: CPT

## 2024-10-23 PROCEDURE — 93296 REM INTERROG EVL PM/IDS: CPT

## 2024-11-05 ENCOUNTER — RX RENEWAL (OUTPATIENT)
Age: 89
End: 2024-11-05

## 2024-11-15 ENCOUNTER — RX RENEWAL (OUTPATIENT)
Age: 89
End: 2024-11-15

## 2025-01-24 ENCOUNTER — APPOINTMENT (OUTPATIENT)
Dept: ELECTROPHYSIOLOGY | Facility: CLINIC | Age: 89
End: 2025-01-24
Payer: MEDICARE

## 2025-01-24 ENCOUNTER — NON-APPOINTMENT (OUTPATIENT)
Age: 89
End: 2025-01-24

## 2025-01-24 PROCEDURE — 93296 REM INTERROG EVL PM/IDS: CPT

## 2025-01-24 PROCEDURE — 93295 DEV INTERROG REMOTE 1/2/MLT: CPT

## 2025-03-12 ENCOUNTER — LABORATORY RESULT (OUTPATIENT)
Age: 89
End: 2025-03-12

## 2025-03-12 ENCOUNTER — APPOINTMENT (OUTPATIENT)
Dept: INTERNAL MEDICINE | Facility: CLINIC | Age: 89
End: 2025-03-12
Payer: MEDICARE

## 2025-03-12 VITALS
DIASTOLIC BLOOD PRESSURE: 74 MMHG | BODY MASS INDEX: 24.52 KG/M2 | HEART RATE: 75 BPM | HEIGHT: 73 IN | OXYGEN SATURATION: 96 % | WEIGHT: 185 LBS | SYSTOLIC BLOOD PRESSURE: 133 MMHG | TEMPERATURE: 97.6 F

## 2025-03-12 DIAGNOSIS — I10 ESSENTIAL (PRIMARY) HYPERTENSION: ICD-10-CM

## 2025-03-12 DIAGNOSIS — Z95.810 PRESENCE OF AUTOMATIC (IMPLANTABLE) CARDIAC DEFIBRILLATOR: ICD-10-CM

## 2025-03-12 DIAGNOSIS — F10.988: ICD-10-CM

## 2025-03-12 DIAGNOSIS — J44.9 CHRONIC OBSTRUCTIVE PULMONARY DISEASE, UNSPECIFIED: ICD-10-CM

## 2025-03-12 DIAGNOSIS — Z00.00 ENCOUNTER FOR GENERAL ADULT MEDICAL EXAMINATION W/OUT ABNORMAL FINDINGS: ICD-10-CM

## 2025-03-12 DIAGNOSIS — E03.9 HYPOTHYROIDISM, UNSPECIFIED: ICD-10-CM

## 2025-03-12 DIAGNOSIS — F10.10 ALCOHOL ABUSE, UNCOMPLICATED: ICD-10-CM

## 2025-03-12 PROCEDURE — G0439: CPT

## 2025-03-16 LAB
ALBUMIN SERPL ELPH-MCNC: 3.4 G/DL
ALP BLD-CCNC: 122 U/L
ALT SERPL-CCNC: 14 U/L
ANION GAP SERPL CALC-SCNC: 11 MMOL/L
AST SERPL-CCNC: 21 U/L
BILIRUB SERPL-MCNC: 0.3 MG/DL
BUN SERPL-MCNC: 23 MG/DL
CALCIUM SERPL-MCNC: 9.4 MG/DL
CHLORIDE SERPL-SCNC: 104 MMOL/L
CHOLEST SERPL-MCNC: 149 MG/DL
CO2 SERPL-SCNC: 26 MMOL/L
CREAT SERPL-MCNC: 0.84 MG/DL
EGFRCR SERPLBLD CKD-EPI 2021: 83 ML/MIN/1.73M2
ESTIMATED AVERAGE GLUCOSE: 114 MG/DL
FOLATE SERPL-MCNC: 8.4 NG/ML
GLUCOSE SERPL-MCNC: 100 MG/DL
HBA1C MFR BLD HPLC: 5.6 %
HCT VFR BLD CALC: 38.3 %
HDLC SERPL-MCNC: 49 MG/DL
HGB BLD-MCNC: 12.5 G/DL
LDLC SERPL CALC-MCNC: 81 MG/DL
MCHC RBC-ENTMCNC: 30.5 PG
MCHC RBC-ENTMCNC: 32.6 G/DL
MCV RBC AUTO: 93.4 FL
NONHDLC SERPL-MCNC: 99 MG/DL
NT-PROBNP SERPL-MCNC: 364 PG/ML
PLATELET # BLD AUTO: 246 K/UL
POTASSIUM SERPL-SCNC: 4.7 MMOL/L
PROT SERPL-MCNC: 5.9 G/DL
RBC # BLD: 4.1 M/UL
RBC # FLD: 13.6 %
SODIUM SERPL-SCNC: 141 MMOL/L
T4 FREE SERPL-MCNC: 1.3 NG/DL
TRIGL SERPL-MCNC: 101 MG/DL
TSH SERPL-ACNC: 3.26 UIU/ML
VIT B12 SERPL-MCNC: 343 PG/ML
WBC # FLD AUTO: 8.1 K/UL

## 2025-03-26 ENCOUNTER — APPOINTMENT (OUTPATIENT)
Dept: RADIOLOGY | Facility: CLINIC | Age: 89
End: 2025-03-26

## 2025-04-01 ENCOUNTER — APPOINTMENT (OUTPATIENT)
Dept: RADIOLOGY | Facility: CLINIC | Age: 89
End: 2025-04-01
Payer: MEDICARE

## 2025-04-01 ENCOUNTER — OUTPATIENT (OUTPATIENT)
Dept: OUTPATIENT SERVICES | Facility: HOSPITAL | Age: 89
LOS: 1 days | End: 2025-04-01
Payer: MEDICARE

## 2025-04-01 DIAGNOSIS — I50.9 HEART FAILURE, UNSPECIFIED: ICD-10-CM

## 2025-04-01 PROCEDURE — 71046 X-RAY EXAM CHEST 2 VIEWS: CPT | Mod: 26

## 2025-04-01 PROCEDURE — 71046 X-RAY EXAM CHEST 2 VIEWS: CPT

## 2025-04-01 NOTE — PHYSICAL THERAPY INITIAL EVALUATION ADULT - BALANCE DISTURBANCE, IDENTIFIED IMPAIRMENT CONTRIBUTE, REHAB EVAL
In an effort to ensure that our patients [employer], a Team Member has reviewed your chart and identified an opportunity to provide the best care possible. An attempt was made to discuss or schedule due or overdue Preventive or Chronic Condition care.     The Outcome was Contact was not made, letter/portal message sent.  We are attempting to schedule a colorectal cancer screening. If you have any questions or need help with scheduling, contact your primary care provider.. Care Gaps identified: Colorectal Cancer Screening.     Appointment needed:  colorectal cancer screening          decreased strength

## 2025-04-08 RX ORDER — FLUTICASONE FUROATE AND VILANTEROL TRIFENATATE 200; 25 UG/1; UG/1
200-25 POWDER RESPIRATORY (INHALATION) DAILY
Qty: 1 | Refills: 5 | Status: ACTIVE | COMMUNITY
Start: 2025-04-08 | End: 1900-01-01

## 2025-04-25 ENCOUNTER — APPOINTMENT (OUTPATIENT)
Dept: ELECTROPHYSIOLOGY | Facility: CLINIC | Age: 89
End: 2025-04-25

## 2025-04-25 ENCOUNTER — NON-APPOINTMENT (OUTPATIENT)
Age: 89
End: 2025-04-25

## 2025-04-25 PROCEDURE — 93296 REM INTERROG EVL PM/IDS: CPT

## 2025-04-25 PROCEDURE — 93295 DEV INTERROG REMOTE 1/2/MLT: CPT

## 2025-05-29 ENCOUNTER — RX RENEWAL (OUTPATIENT)
Age: 89
End: 2025-05-29

## 2025-06-10 ENCOUNTER — RX RENEWAL (OUTPATIENT)
Age: 89
End: 2025-06-10

## 2025-06-13 ENCOUNTER — RX RENEWAL (OUTPATIENT)
Age: 89
End: 2025-06-13

## 2025-07-18 NOTE — PATIENT PROFILE ADULT. - NSALCOHOLTYPE_GEN__A_CORE_SD
Pt with chest pain in center of chest for last 5 hours on and off. Hurts worse with movement. Comes and goes. Denies Nausea. wine

## 2025-07-24 ENCOUNTER — APPOINTMENT (OUTPATIENT)
Dept: ELECTROPHYSIOLOGY | Facility: CLINIC | Age: 89
End: 2025-07-24
Payer: MEDICARE

## 2025-07-24 ENCOUNTER — NON-APPOINTMENT (OUTPATIENT)
Age: 89
End: 2025-07-24

## 2025-07-24 VITALS — DIASTOLIC BLOOD PRESSURE: 52 MMHG | HEART RATE: 74 BPM | SYSTOLIC BLOOD PRESSURE: 100 MMHG

## 2025-07-24 DIAGNOSIS — I50.9 HEART FAILURE, UNSPECIFIED: ICD-10-CM

## 2025-07-24 PROCEDURE — 93283 PRGRMG EVAL IMPLANTABLE DFB: CPT

## 2025-08-29 ENCOUNTER — APPOINTMENT (OUTPATIENT)
Dept: GERIATRICS | Facility: CLINIC | Age: 89
End: 2025-08-29
Payer: MEDICARE

## 2025-08-29 VITALS
WEIGHT: 175 LBS | HEIGHT: 73 IN | TEMPERATURE: 97.5 F | HEART RATE: 81 BPM | SYSTOLIC BLOOD PRESSURE: 143 MMHG | OXYGEN SATURATION: 95 % | RESPIRATION RATE: 16 BRPM | DIASTOLIC BLOOD PRESSURE: 69 MMHG | BODY MASS INDEX: 23.19 KG/M2

## 2025-08-29 DIAGNOSIS — I50.9 HEART FAILURE, UNSPECIFIED: ICD-10-CM

## 2025-08-29 DIAGNOSIS — F03.918 UNSPECIFIED DEMENTIA, UNSPECIFIED SEVERITY, WITH OTHER BEHAVIORAL DISTURBANCE: ICD-10-CM

## 2025-08-29 DIAGNOSIS — M54.2 CERVICALGIA: ICD-10-CM

## 2025-08-29 DIAGNOSIS — Z20.822 CONTACT WITH AND (SUSPECTED) EXPOSURE TO COVID-19: ICD-10-CM

## 2025-08-29 DIAGNOSIS — M65.30 TRIGGER FINGER, UNSPECIFIED FINGER: ICD-10-CM

## 2025-08-29 DIAGNOSIS — R26.81 UNSTEADINESS ON FEET: ICD-10-CM

## 2025-08-29 DIAGNOSIS — M10.9 GOUT, UNSPECIFIED: ICD-10-CM

## 2025-08-29 DIAGNOSIS — I10 ESSENTIAL (PRIMARY) HYPERTENSION: ICD-10-CM

## 2025-08-29 DIAGNOSIS — F10.988: ICD-10-CM

## 2025-08-29 DIAGNOSIS — J44.9 CHRONIC OBSTRUCTIVE PULMONARY DISEASE, UNSPECIFIED: ICD-10-CM

## 2025-08-29 DIAGNOSIS — E03.9 HYPOTHYROIDISM, UNSPECIFIED: ICD-10-CM

## 2025-08-29 PROCEDURE — 99205 OFFICE O/P NEW HI 60 MIN: CPT

## 2025-09-03 ENCOUNTER — NON-APPOINTMENT (OUTPATIENT)
Age: 89
End: 2025-09-03

## 2025-09-03 LAB
ALBUMIN SERPL ELPH-MCNC: 3 G/DL
ALP BLD-CCNC: 112 U/L
ALT SERPL-CCNC: 12 U/L
ANION GAP SERPL CALC-SCNC: 11 MMOL/L
AST SERPL-CCNC: 28 U/L
BASOPHILS # BLD AUTO: 0.07 K/UL
BASOPHILS NFR BLD AUTO: 0.9 %
BILIRUB SERPL-MCNC: 0.4 MG/DL
BUN SERPL-MCNC: 26 MG/DL
CALCIUM SERPL-MCNC: 8.6 MG/DL
CHLORIDE SERPL-SCNC: 105 MMOL/L
CO2 SERPL-SCNC: 22 MMOL/L
CREAT SERPL-MCNC: 0.83 MG/DL
EGFRCR SERPLBLD CKD-EPI 2021: 84 ML/MIN/1.73M2
EOSINOPHIL # BLD AUTO: 0.36 K/UL
EOSINOPHIL NFR BLD AUTO: 4.4 %
GLUCOSE SERPL-MCNC: 73 MG/DL
HCT VFR BLD CALC: 35.9 %
HGB BLD-MCNC: 11.2 G/DL
IMM GRANULOCYTES NFR BLD AUTO: 0.5 %
LYMPHOCYTES # BLD AUTO: 2.16 K/UL
LYMPHOCYTES NFR BLD AUTO: 26.5 %
MAN DIFF?: NORMAL
MCHC RBC-ENTMCNC: 29.2 PG
MCHC RBC-ENTMCNC: 31.2 G/DL
MCV RBC AUTO: 93.7 FL
MONOCYTES # BLD AUTO: 0.57 K/UL
MONOCYTES NFR BLD AUTO: 7 %
NEUTROPHILS # BLD AUTO: 4.94 K/UL
NEUTROPHILS NFR BLD AUTO: 60.7 %
PLATELET # BLD AUTO: 198 K/UL
POTASSIUM SERPL-SCNC: 4.4 MMOL/L
PREALB SERPL NEPH-MCNC: 12 MG/DL
PROT SERPL-MCNC: 4.8 G/DL
RBC # BLD: 3.83 M/UL
RBC # FLD: 14.8 %
SODIUM SERPL-SCNC: 138 MMOL/L
TSH SERPL-ACNC: 3.08 UIU/ML
URATE SERPL-MCNC: 7.5 MG/DL
WBC # FLD AUTO: 8.14 K/UL